# Patient Record
Sex: MALE | Race: WHITE | NOT HISPANIC OR LATINO | Employment: OTHER | URBAN - METROPOLITAN AREA
[De-identification: names, ages, dates, MRNs, and addresses within clinical notes are randomized per-mention and may not be internally consistent; named-entity substitution may affect disease eponyms.]

---

## 2017-01-17 DIAGNOSIS — J30.1 NON-SEASONAL ALLERGIC RHINITIS DUE TO POLLEN: Primary | ICD-10-CM

## 2017-01-17 DIAGNOSIS — J30.1 SEASONAL ALLERGIC RHINITIS DUE TO POLLEN: Primary | ICD-10-CM

## 2017-01-17 RX ORDER — LORATADINE 10 MG/1
10 TABLET ORAL DAILY
Qty: 90 TABLET | Refills: 3 | Status: SHIPPED | OUTPATIENT
Start: 2017-01-17 | End: 2017-07-26

## 2017-01-17 RX ORDER — DESLORATADINE 5 MG/1
5 TABLET ORAL DAILY
Qty: 90 TABLET | Refills: 3 | Status: SHIPPED | OUTPATIENT
Start: 2017-01-17 | End: 2017-01-17

## 2017-01-27 ENCOUNTER — TELEPHONE (OUTPATIENT)
Dept: FAMILY MEDICINE | Facility: CLINIC | Age: 52
End: 2017-01-27

## 2017-01-27 DIAGNOSIS — J30.1 ALLERGIC RHINITIS DUE TO POLLEN, UNSPECIFIED RHINITIS SEASONALITY: Primary | ICD-10-CM

## 2017-01-27 NOTE — TELEPHONE ENCOUNTER
"Pt stopped by . He wants Rx for Claritin D.  Claritin Rx had been sent to [facility] pharmacy and he wants the \"D\"  "

## 2017-05-21 DIAGNOSIS — I10 HTN (HYPERTENSION): ICD-10-CM

## 2017-05-21 NOTE — TELEPHONE ENCOUNTER
Diltiazem         Last Written Prescription Date: 04/08/17  Last Fill Quantity: 90, # refills: 1    Last Office Visit with G, [facility] or [facility] prescribing provider:  04/12/16   Future Office Visit:      BP Readings from Last 3 Encounters:   04/25/16 132/84   04/12/16 136/84   07/13/15 134/80     No results found for: ALT  Lab Results   Component Value Date    CHOL 257 05/18/2015     Lab Results   Component Value Date    HDL 86 05/18/2015     Lab Results   Component Value Date     05/18/2015     Lab Results   Component Value Date    TRIG 58 05/18/2015     Lab Results   Component Value Date    CHOLHDLRATIO 3.0 05/18/2015

## 2017-05-22 DIAGNOSIS — I10 HTN (HYPERTENSION): ICD-10-CM

## 2017-05-22 RX ORDER — DILTIAZEM HYDROCHLORIDE 360 MG/1
CAPSULE, EXTENDED RELEASE ORAL
Qty: 90 CAPSULE | Refills: 0 | OUTPATIENT
Start: 2017-05-22

## 2017-05-22 RX ORDER — DILTIAZEM HYDROCHLORIDE 360 MG/1
360 CAPSULE, EXTENDED RELEASE ORAL DAILY
Qty: 30 CAPSULE | Refills: 0 | Status: SHIPPED | OUTPATIENT
Start: 2017-05-22 | End: 2017-06-21

## 2017-05-22 NOTE — TELEPHONE ENCOUNTER
Look like this is a dupilcation from 5/22/17 for diltiazem (CARDIZEM CD) 360 MG 24 hr CD capsule

## 2017-06-21 DIAGNOSIS — J30.1 ALLERGIC RHINITIS DUE TO POLLEN, UNSPECIFIED RHINITIS SEASONALITY: ICD-10-CM

## 2017-06-21 DIAGNOSIS — I10 HTN (HYPERTENSION): ICD-10-CM

## 2017-06-21 NOTE — TELEPHONE ENCOUNTER
diltiazem (CARDIZEM CD) 360 MG 24 hr CD capsule      Last Written Prescription Date: 5/22/17  Last Fill Quantity: 30, # refills: 0  Last Office Visit with [facility], [facility] or [facility] prescribing provider: 4/12/17       Potassium   Date Value Ref Range Status   05/18/2015 4.6 3.4 - 5.3 mmol/L Final     Creatinine   Date Value Ref Range Status   05/18/2015 0.91 0.66 - 1.25 mg/dL Final     BP Readings from Last 3 Encounters:   04/25/16 132/84   04/12/16 136/84   07/13/15 134/80     loratadine-pseudoePHEDrine (CLARITIN-D 24-HOUR)  MG per 24 hr tablet      Last Written Prescription Date: 1/27/17  Last Fill Quantity: 30,  # refills: 2   Last Office Visit with [facility], [facility] or [facility] prescribing provider: 4/12/17

## 2017-06-22 RX ORDER — DILTIAZEM HYDROCHLORIDE 360 MG/1
360 CAPSULE, EXTENDED RELEASE ORAL DAILY
Qty: 30 CAPSULE | Refills: 0 | Status: SHIPPED | OUTPATIENT
Start: 2017-06-22 | End: 2017-07-23

## 2017-06-22 RX ORDER — LORATADINE PSEUDOEPHEDRINE SULFATE 10; 240 MG/1; MG/1
TABLET, EXTENDED RELEASE ORAL
Qty: 30 TABLET | Refills: 0 | Status: SHIPPED | OUTPATIENT
Start: 2017-06-22 | End: 2017-07-26

## 2017-07-22 DIAGNOSIS — J30.1 ALLERGIC RHINITIS DUE TO POLLEN: ICD-10-CM

## 2017-07-22 NOTE — LETTER
[facility]  [address] [place] [address]  Phone: [phone]  Fax: [phone]       July 24, 2017         Roni Martin  [address] [place] [id number]            Dear Roni:    We are concerned about your health care.  We recently provided you with medication refills.  Many medications require routine follow-up with your doctor.    Your prescription(s) have been refilled for diltiazem and wal-itin D so you may have time for the above noted follow-up. Please call to schedule soon so we can assure you have an appointment before your next refills are needed.    Thank you,      Ana Gonzales PA-C/ Francine Garcia RN

## 2017-07-23 DIAGNOSIS — I10 BENIGN ESSENTIAL HYPERTENSION: Primary | ICD-10-CM

## 2017-07-24 RX ORDER — LORATADINE 10 MG
TABLET ORAL
Qty: 30 TABLET | OUTPATIENT
Start: 2017-07-24

## 2017-07-24 RX ORDER — DILTIAZEM HYDROCHLORIDE 360 MG/1
360 CAPSULE, EXTENDED RELEASE ORAL DAILY
Qty: 7 CAPSULE | Refills: 0 | Status: SHIPPED | OUTPATIENT
Start: 2017-07-24 | End: 2017-07-26

## 2017-07-24 NOTE — TELEPHONE ENCOUNTER
Wal-Itin D       Last Written Prescription Date: 6/22/17  Last Fill Quantity: 30,  # refills: 0   Last Office Visit with [facility], [facility] or [facility] prescribing provider: 4/12/16

## 2017-07-24 NOTE — TELEPHONE ENCOUNTER
Overdue on office visit.  No refill given of allergy medication as this is controlled substance.  I did refill BP med x 1 wk.

## 2017-07-24 NOTE — TELEPHONE ENCOUNTER
Refilled x 1 wk only as was already told last month that needed to be seen before further refills.

## 2017-07-24 NOTE — TELEPHONE ENCOUNTER
Diltiazem 360 mg         Last Written Prescription Date: 6/22/17  Last Fill Quantity: 30, # refills: 0    Last Office Visit with G, P or [facility] prescribing provider:  4/12/16   Future Office Visit:      BP Readings from Last 3 Encounters:   04/25/16 132/84   04/12/16 136/84   07/13/15 134/80     No results found for: ALT  Lab Results   Component Value Date    CHOL 257 05/18/2015     Lab Results   Component Value Date    HDL 86 05/18/2015     Lab Results   Component Value Date     05/18/2015     Lab Results   Component Value Date    TRIG 58 05/18/2015     Lab Results   Component Value Date    CHOLHDLRATIO 3.0 05/18/2015

## 2017-07-26 ENCOUNTER — OFFICE VISIT (OUTPATIENT)
Dept: FAMILY MEDICINE | Facility: CLINIC | Age: 52
End: 2017-07-26
Payer: COMMERCIAL

## 2017-07-26 VITALS
RESPIRATION RATE: 20 BRPM | SYSTOLIC BLOOD PRESSURE: 128 MMHG | DIASTOLIC BLOOD PRESSURE: 84 MMHG | BODY MASS INDEX: 30.16 KG/M2 | HEART RATE: 68 BPM | WEIGHT: 222.4 LBS | TEMPERATURE: 97.8 F

## 2017-07-26 DIAGNOSIS — J30.1 CHRONIC SEASONAL ALLERGIC RHINITIS DUE TO POLLEN: Primary | ICD-10-CM

## 2017-07-26 DIAGNOSIS — I10 BENIGN ESSENTIAL HYPERTENSION: ICD-10-CM

## 2017-07-26 PROCEDURE — 99213 OFFICE O/P EST LOW 20 MIN: CPT | Performed by: PHYSICIAN ASSISTANT

## 2017-07-26 RX ORDER — DILTIAZEM HYDROCHLORIDE 360 MG/1
360 CAPSULE, EXTENDED RELEASE ORAL DAILY
Qty: 90 CAPSULE | Refills: 3 | Status: SHIPPED | OUTPATIENT
Start: 2017-07-26 | End: 2018-08-01

## 2017-07-26 ASSESSMENT — ENCOUNTER SYMPTOMS
HEMOPTYSIS: 0
DIZZINESS: 0
EYE REDNESS: 0
NAUSEA: 0
DEPRESSION: 0
NERVOUS/ANXIOUS: 0
DOUBLE VISION: 0
FOCAL WEAKNESS: 0
DIARRHEA: 0
PALPITATIONS: 0
CONSTIPATION: 0
SENSORY CHANGE: 0
WEAKNESS: 0
BACK PAIN: 0
EYE PAIN: 0
SHORTNESS OF BREATH: 0
ORTHOPNEA: 0
TINGLING: 0
DYSURIA: 0
MYALGIAS: 0
PHOTOPHOBIA: 0
INSOMNIA: 0
FEVER: 0
EYE DISCHARGE: 0
BLOOD IN STOOL: 0
HEADACHES: 0
BLURRED VISION: 0
SORE THROAT: 0
ABDOMINAL PAIN: 0
SPUTUM PRODUCTION: 0
NEUROLOGICAL NEGATIVE: 1
NECK PAIN: 0
WEIGHT LOSS: 0
SEIZURES: 0
DIAPHORESIS: 0
HALLUCINATIONS: 0
WHEEZING: 0
HEARTBURN: 0
VOMITING: 0
COUGH: 0
FREQUENCY: 0
LOSS OF CONSCIOUSNESS: 0

## 2017-07-26 ASSESSMENT — LIFESTYLE VARIABLES: SUBSTANCE_ABUSE: 0

## 2017-07-26 NOTE — MR AVS SNAPSHOT
"              After Visit Summary   2017    Roni Martin    MRN: [id number]           Patient Information     Date Of Birth          1965        Visit Information        Provider Department      2017 2:20 PM Meliton Woods PA-C [facility]        Today's Diagnoses     Chronic seasonal allergic rhinitis due to pollen    -  1    Benign essential hypertension           Follow-ups after your visit        Follow-up notes from your care team     Return if symptoms worsen or fail to improve.      Who to contact     If you have questions or need follow up information about today's clinic visit or your schedule please contact [facility] directly at [phone].  Normal or non-critical lab and imaging results will be communicated to you by MyChart, letter or phone within 4 business days after the clinic has received the results. If you do not hear from us within 7 days, please contact the clinic through MyChart or phone. If you have a critical or abnormal lab result, we will notify you by phone as soon as possible.  Submit refill requests through [employer] or call your pharmacy and they will forward the refill request to us. Please allow 3 business days for your refill to be completed.          Additional Information About Your Visit        MyChart Information     [employer] lets you send messages to your doctor, view your test results, renew your prescriptions, schedule appointments and more. To sign up, go to [email or web address] . Click on \"Log in\" on the left side of the screen, which will take you to the Welcome page. Then click on \"Sign up Now\" on the right side of the page.     You will be asked to enter the access code listed below, as well as some personal information. Please follow the directions to create your username and password.     Your access code is: [email or web address]  Expires: 10/24/2017  3:30 PM     Your access code will  in 90 days. If you need help or " a new code, please call your [facility] or [phone].        Care EveryWhere ID     This is your Care EveryWhere ID. This could be used by other organizations to access your [facility] medical records  [id number]        Your Vitals Were     Pulse Temperature Respirations BMI (Body Mass Index)          68 97.8  F (36.6  C) (Tympanic) 20 30.16 kg/m2         Blood Pressure from Last 3 Encounters:   07/26/17 128/84   04/25/16 132/84   04/12/16 136/84    Weight from Last 3 Encounters:   07/26/17 222 lb 6.4 oz (100.9 kg)   04/12/16 229 lb (103.9 kg)   06/16/15 229 lb (103.9 kg)              Today, you had the following     No orders found for display         Today's Medication Changes          These changes are accurate as of: 7/26/17  3:30 PM.  If you have any questions, ask your nurse or doctor.               These medicines have changed or have updated prescriptions.        Dose/Directions    loratadine-pseudoePHEDrine  MG per 24 hr tablet   Commonly known as:  CLARITIN-D 24 HOUR   This may have changed:  See the new instructions.   Used for:  Chronic seasonal allergic rhinitis due to pollen   Changed by:  Meliton Woods PA-C        Dose:  1 tablet   Take 1 tablet by mouth daily   Quantity:  90 tablet   Refills:  1            Where to get your medicines      These medications were sent to [facility] [address] [place] [address] [address] [facility] OF [address] [place] [address]  [address] [facility] [address]     Phone:  [phone]     diltiazem 360 MG 24 hr CD capsule         Some of these will need a paper prescription and others can be bought over the counter.  Ask your nurse if you have questions.     Bring a paper prescription for each of these medications     loratadine-pseudoePHEDrine  MG per 24 hr tablet                Primary Care Provider Office Phone # Fax #    Ana Gonzales PA-C [phone]       [address]  [facility] [address]        Equal Access to Services     [facility] DONNY : Hadii aad ku hadrichjuventino Kingali, watobida luqadaha, qaybta kaalmamaximino veliz, renzo hdz. So [facility] [phone].    ATENCIÓN: Si habla español, tiene a perez disposición servicios gratuitos de asistencia lingüística. Jasbirame al [phone].    We comply with applicable federal civil rights laws and [place] laws. We do not discriminate on the basis of race, color, national origin, age, disability sex, sexual orientation or gender identity.            Thank you!     Thank you for choosing [facility]  for your care. Our goal is always to provide you with excellent care. Hearing back from our patients is one way we can continue to improve our services. Please take a few minutes to complete the written survey that you may receive in the mail after your visit with us. Thank you!             Your Updated Medication List - Protect others around you: Learn how to safely use, store and throw away your medicines at www.disposemymeds.org.          This list is accurate as of: 7/26/17  3:30 PM.  Always use your most recent med list.                   Brand Name Dispense Instructions for use Diagnosis    diltiazem 360 MG 24 hr CD capsule    CARDIZEM CD; CARTIA XT    90 capsule    Take 1 capsule (360 mg) by mouth daily Overdue on office visit.    Benign essential hypertension       fluticasone 50 MCG/ACT spray    FLONASE    1 Package    Spray 1-2 sprays into both nostrils daily.        loratadine-pseudoePHEDrine  MG per 24 hr tablet    CLARITIN-D 24 HOUR    90 tablet    Take 1 tablet by mouth daily    Chronic seasonal allergic rhinitis due to pollen       order for DME     1 Units    Equipment being ordered: automated home BP cuff    HTN (hypertension)

## 2017-07-26 NOTE — PROGRESS NOTES
HPI    SUBJECTIVE:                                                    Roni Martin is a 51 year old male who presents to clinic today for follow-up of his blood pressure. He is frustrated that he has to come in for this appointment because this is his only day for the last 20 days. His blood pressure has been stable and he's been having no problems.    Hypertension Follow-up      Outpatient blood pressures are not being checked.    Low Salt Diet: not monitoring salt        Amount of exercise or physical activity: None    Problems taking medications regularly: No    Medication side effects: none  Diet: regular (no restrictions)    Problem list and histories reviewed & adjusted, as indicated.  Additional history: as documented    Patient Active Problem List   Diagnosis     Obesity     Benign essential hypertension     Elevated fasting blood sugar     Hyperlipidemia with target LDL less than 130     History reviewed. No pertinent surgical history.    Social History   Substance Use Topics     Smoking status: Never Smoker     Smokeless tobacco: Never Used     Alcohol use Yes     Family History   Problem Relation Age of Onset     Parkinsonism Mother      DIABETES Father      Hypertension Father      Hyperlipidemia No family hx of      HEART DISEASE No family hx of          Current Outpatient Prescriptions   Medication Sig Dispense Refill     diltiazem (CARDIZEM CD; CARTIA XT) 360 MG 24 hr CD capsule Take 1 capsule (360 mg) by mouth daily Overdue on office visit. 90 capsule 3     loratadine-pseudoePHEDrine (CLARITIN-D 24 HOUR)  MG per 24 hr tablet Take 1 tablet by mouth daily 90 tablet 1     ORDER FOR DME, SET TO LOCAL PRINT, Equipment being ordered: automated home BP cuff 1 Units 0     fluticasone (FLONASE) 50 MCG/ACT nasal spray Spray 1-2 sprays into both nostrils daily. 1 Package 11     [DISCONTINUED] diltiazem (CARDIZEM CD; CARTIA XT) 360 MG 24 hr CD capsule Take 1 capsule (360 mg) by mouth daily Overdue on  office visit. 7 capsule 0     No Known Allergies  Recent Labs   Lab Test  05/18/15   0940   LDL  159*   HDL  86   TRIG  58   CR  0.91   GFRESTIMATED  87   GFRESTBLACK  >90   GFR Calc     POTASSIUM  4.6   TSH  1.53      Labs reviewed in EPIC        Reviewed and updated as needed this visit by clinical staff     Reviewed and updated as needed this visit by Provider       Review of Systems   Constitutional: Negative for diaphoresis, fever, malaise/fatigue and weight loss.   HENT: Negative for congestion, ear discharge, ear pain, hearing loss, nosebleeds and sore throat.    Eyes: Negative for blurred vision, double vision, photophobia, pain, discharge and redness.   Respiratory: Negative for cough, hemoptysis, sputum production, shortness of breath and wheezing.    Cardiovascular: Negative for chest pain, palpitations, orthopnea and leg swelling.   Gastrointestinal: Negative for abdominal pain, blood in stool, constipation, diarrhea, heartburn, melena, nausea and vomiting.   Genitourinary: Negative.  Negative for dysuria, frequency and urgency.   Musculoskeletal: Negative for back pain, joint pain, myalgias and neck pain.   Skin: Negative for itching and rash.   Neurological: Negative.  Negative for dizziness, tingling, sensory change, focal weakness, seizures, loss of consciousness, weakness and headaches.   Endo/Heme/Allergies: Negative.    Psychiatric/Behavioral: Negative for depression, hallucinations, substance abuse and suicidal ideas. The patient is not nervous/anxious and does not have insomnia.          Physical Exam   Constitutional: He is oriented to person, place, and time and well-developed, well-nourished, and in no distress.   HENT:   Head: Normocephalic and atraumatic.   Right Ear: External ear normal.   Left Ear: External ear normal.   Nose: Nose normal.   Mouth/Throat: Oropharynx is clear and moist.   Eyes: Conjunctivae and EOM are normal. Pupils are equal, round, and reactive to light.  Right eye exhibits no discharge. Left eye exhibits no discharge. No scleral icterus.   Neck: Normal range of motion. Neck supple. No thyromegaly present.   Cardiovascular: Normal rate, regular rhythm, normal heart sounds and intact distal pulses.  Exam reveals no gallop and no friction rub.    No murmur heard.  Pulmonary/Chest: Effort normal and breath sounds normal. No respiratory distress. He has no wheezes. He has no rales. He exhibits no tenderness.   Abdominal: Soft. Bowel sounds are normal. He exhibits no distension and no mass. There is no tenderness. There is no rebound and no guarding.   Musculoskeletal: Normal range of motion. He exhibits no edema or tenderness.   Lymphadenopathy:     He has no cervical adenopathy.   Neurological: He is alert and oriented to person, place, and time. He has normal reflexes. No cranial nerve deficit. He exhibits normal muscle tone. Gait normal. Coordination normal.   Skin: Skin is warm and dry. No rash noted. No erythema.   Psychiatric: Mood, memory, affect and judgment normal.       (J30.1) Chronic seasonal allergic rhinitis due to pollen  (primary encounter diagnosis)  Comment:   Plan: loratadine-pseudoePHEDrine (CLARITIN-D 24 HOUR)         MG per 24 hr tablet            (I10) Benign essential hypertension  Comment:  Plan: diltiazem (CARDIZEM CD; CARTIA XT) 360 MG 24 hr        CD capsule            Medication refilled and follow up in the next year.

## 2017-07-26 NOTE — NURSING NOTE
Chief Complaint   Patient presents with     Hypertension       Initial BP (!) 142/92 (BP Location: Right arm, Patient Position: Chair, Cuff Size: Adult Large)  Pulse 68  Temp 97.8  F (36.6  C) (Tympanic)  Resp 20  Wt 222 lb 6.4 oz (100.9 kg)  BMI 30.16 kg/m2 Estimated body mass index is 30.16 kg/(m^2) as calculated from the following:    Height as of 4/12/16: 6' (1.829 m).    Weight as of this encounter: 222 lb 6.4 oz (100.9 kg).  Medication Reconciliation: complete    Health Maintenance that is potentially due pending provider review:  Colonoscopy/FIT    Patient says this is something that he will have to complete during the winter months.    Is there anyone who you would like to be able to receive your results? Yes  If yes have patient fill out MARSHA VALDEZ CMA

## 2017-11-30 ENCOUNTER — TELEPHONE (OUTPATIENT)
Dept: FAMILY MEDICINE | Facility: CLINIC | Age: 52
End: 2017-11-30

## 2017-11-30 ENCOUNTER — RADIANT APPOINTMENT (OUTPATIENT)
Dept: GENERAL RADIOLOGY | Facility: CLINIC | Age: 52
End: 2017-11-30
Attending: FAMILY MEDICINE
Payer: COMMERCIAL

## 2017-11-30 ENCOUNTER — OFFICE VISIT (OUTPATIENT)
Dept: FAMILY MEDICINE | Facility: CLINIC | Age: 52
End: 2017-11-30
Payer: COMMERCIAL

## 2017-11-30 VITALS
DIASTOLIC BLOOD PRESSURE: 89 MMHG | SYSTOLIC BLOOD PRESSURE: 130 MMHG | TEMPERATURE: 97.2 F | HEIGHT: 76 IN | WEIGHT: 225 LBS | HEART RATE: 93 BPM | BODY MASS INDEX: 27.4 KG/M2

## 2017-11-30 DIAGNOSIS — M25.532 LEFT WRIST PAIN: Primary | ICD-10-CM

## 2017-11-30 DIAGNOSIS — M25.532 LEFT WRIST PAIN: ICD-10-CM

## 2017-11-30 DIAGNOSIS — J30.1 CHRONIC SEASONAL ALLERGIC RHINITIS DUE TO POLLEN: ICD-10-CM

## 2017-11-30 PROCEDURE — 99214 OFFICE O/P EST MOD 30 MIN: CPT | Performed by: FAMILY MEDICINE

## 2017-11-30 PROCEDURE — 73110 X-RAY EXAM OF WRIST: CPT | Mod: LT

## 2017-11-30 RX ORDER — PREDNISONE 20 MG/1
TABLET ORAL
Qty: 20 TABLET | Refills: 0 | Status: SHIPPED | OUTPATIENT
Start: 2017-11-30 | End: 2017-12-13

## 2017-11-30 NOTE — PROGRESS NOTES
SUBJECTIVE:   Roni Martin is a 51 year old male who presents to clinic today for the following health issues:      Joint Pain    Onset: L wrist on and off for 2 yrs has increased within the last week     Description:   Location: left wrist  Character: Sharp, Dull ache and tighness    Intensity: severe    Progression of Symptoms: worse    Accompanying Signs & Symptoms:  Other symptoms: radiation of pain to in hand and fingers , swelling, redness and tighness    History:   Previous similar pain: YES- off and on for 2 yrs       Precipitating factors:   Trauma or overuse: YES- overuse Patient is in construction     Alleviating factors:  Improved by: Ibuprofen helps, just a little better with ice     Therapies Tried and outcome: same       Patient is here for left wrist pain . Ongoing for about two years on and off but in the last week pain has been more severe. He does a lot of construction work and he attributes the pain to the repetitive work that he has been doing. This last week there has been swelling ,warmth and pain. He denies any systemic symptoms such as fevers or chills. He also mentioned that he has had Gout in the past and was wondering if this is Gout.         Problem list and histories reviewed & adjusted, as indicated.  Additional history: as documented    Patient Active Problem List   Diagnosis     Obesity     Benign essential hypertension     Elevated fasting blood sugar     Hyperlipidemia with target LDL less than 130     No past surgical history on file.    Social History   Substance Use Topics     Smoking status: Never Smoker     Smokeless tobacco: Never Used     Alcohol use Yes     Family History   Problem Relation Age of Onset     Parkinsonism Mother      DIABETES Father      Hypertension Father      Hyperlipidemia No family hx of      HEART DISEASE No family hx of          Current Outpatient Prescriptions   Medication Sig Dispense Refill     loratadine-pseudoePHEDrine (CLARITIN-D 24 HOUR)  " MG per 24 hr tablet Take 1 tablet by mouth daily 30 tablet 3     predniSONE (DELTASONE) 20 MG tablet Take 3 tabs (60 mg) by mouth daily x 3 days, 2 tabs (40 mg) daily x 3 days, 1 tab (20 mg) daily x 3 days, then 1/2 tab (10 mg) x 3 days. 20 tablet 0     diltiazem (CARDIZEM CD; CARTIA XT) 360 MG 24 hr CD capsule Take 1 capsule (360 mg) by mouth daily Overdue on office visit. 90 capsule 3     fluticasone (FLONASE) 50 MCG/ACT nasal spray Spray 1-2 sprays into both nostrils daily. 1 Package 11     ORDER FOR DME, SET TO LOCAL PRINT, Equipment being ordered: automated home BP cuff 1 Units 0     No Known Allergies  BP Readings from Last 3 Encounters:   11/30/17 130/89   07/26/17 128/84   04/25/16 132/84    Wt Readings from Last 3 Encounters:   11/30/17 225 lb (102.1 kg)   07/26/17 222 lb 6.4 oz (100.9 kg)   04/12/16 229 lb (103.9 kg)                  Labs reviewed in EPIC        Reviewed and updated as needed this visit by clinical staffAllergies       Reviewed and updated as needed this visit by Provider         ROS:  Constitutional, HEENT, cardiovascular, pulmonary, gi and gu systems are negative, except as otherwise noted.      OBJECTIVE:   /89  Pulse 93  Temp 97.2  F (36.2  C)  Ht 6' 4\" (1.93 m)  Wt 225 lb (102.1 kg)  BMI 27.39 kg/m2  Body mass index is 27.39 kg/(m^2).  GENERAL: healthy, alert and no distress  MS: decreased range of motion of left wrist  and tenderness to palpation on surface of left wrist .     Diagnostic Test Results:  X-rays   Shows no acute fracture , await radiology report     ASSESSMENT/PLAN:       1. Chronic seasonal allergic rhinitis due to pollen  Medication refilled. Cautioned on long term use of decongestants. Asked to use plain Claritin  at first and if symptoms worsen then use Claritin D   - loratadine-pseudoePHEDrine (CLARITIN-D 24 HOUR)  MG per 24 hr tablet; Take 1 tablet by mouth daily  Dispense: 30 tablet; Refill: 3    2. Left wrist pain  Wrist x-rays look okay " , await radiology report. Differentials include arthritis flare, gout . Trial of prednisone , if symptoms persist patient is asked to call clinic  - XR Wrist Left G/E 3 Views; Future  - predniSONE (DELTASONE) 20 MG tablet; Take 3 tabs (60 mg) by mouth daily x 3 days, 2 tabs (40 mg) daily x 3 days, 1 tab (20 mg) daily x 3 days, then 1/2 tab (10 mg) x 3 days.  Dispense: 20 tablet; Refill: 0    FUTURE APPOINTMENTS:       - Follow-up visit as needed    Torin Aldana MD  [facility]

## 2017-11-30 NOTE — PATIENT INSTRUCTIONS
Thank you for choosing [facility] ([facility]3].  You may be receiving a survey in the mail from Jak Acosta regarding your visit today.  Please take a few minutes to complete and return the survey to let us know how we are doing.      If you have questions or concerns, please contact us via [employer] or you can contact your care team at [phone].    Our Clinic hours are:  Monday 6:40 am  to 7:00 pm  Tuesday -Friday 6:40 am to 5:00 pm    The [place] outpatient lab hours are:  Monday - Friday 6:10 am to 4:45 pm  Saturdays 7:00 am to 11:00 am  Appointments are required, call [phone]    If you have clinical questions after hours or would like to schedule an appointment,  call the clinic at [phone].  What Is Arthritis?  Arthritis is a disease that affects the joints. Joints are the parts where bones meet and move. It can affect any joint in your body. There are many types of arthritis, including osteoarthritis, rheumatoid arthritis, gout and lupus. If your symptoms are mild, medicines may be enough to ease pain and swelling. For more severe arthritis, you may need surgery to improve the condition of the joint or replace part or all of the joint.                  What causes arthritis?  Cartilage is a smooth substance that protects the ends of your bones and provides cushioning. When you have arthritis, this cartilage breaks down and can no longer protect your bones. This can happen from an autoimmune disease. Or it can happen from wear and tear, infections, or trauma. The bones rub against each other, causing pain and swelling. Over time, small pieces of rough or splintered bone (bone spurs) may develop. The joint's range of motion can become limited.  Symptoms  Some of the more common symptoms of arthritis include:    Joint pain and stiffness. Pain and stiffness get worse with long periods of rest or using a joint too long or too hard.    Joints that have lost normal shape and motion    Tender, inflamed  joints. They may look red and feel warm.    Grinding or popping noise with joint movement    Feeling tired all the time  Reducing symptoms  Following a healthy lifestyle by losing weight and exercising can help ease symptoms of osteoarthritis. Strengthening muscles around the affected joint may will reduce the strain on the joint. Hot and cold packs may help. Over-the-counter and prescription medicines can be very helpful for arthritis. Talk with your healthcare provider about the best treatments for your condition.   Date Last Reviewed: 5/1/2017 2000-2017 [employer]. [address] [place] [address], [place] [address]. All rights reserved. This information is not intended as a substitute for professional medical care. Always follow your healthcare professional's instructions.

## 2017-11-30 NOTE — MR AVS SNAPSHOT
After Visit Summary   11/30/2017    Roni Martin    MRN: [id number]           Patient Information     Date Of Birth          1965        Visit Information        Provider Department      11/30/2017 8:00 AM Torin Aldana MD [facility]        Today's Diagnoses     Left wrist pain    -  1    Chronic seasonal allergic rhinitis due to pollen          Care Instructions          Thank you for choosing [facility].  You may be receiving a survey in the mail from [facility] regarding your visit today.  Please take a few minutes to complete and return the survey to let us know how we are doing.      If you have questions or concerns, please contact us via [employer] or you can contact your care team at [phone].    Our Clinic hours are:  Monday 6:40 am  to 7:00 pm  Tuesday -Friday 6:40 am to 5:00 pm    The [place] outpatient lab hours are:  Monday - Friday 6:10 am to 4:45 pm  Saturdays 7:00 am to 11:00 am  Appointments are required, call [phone]    If you have clinical questions after hours or would like to schedule an appointment,  call the clinic at [phone].  What Is Arthritis?  Arthritis is a disease that affects the joints. Joints are the parts where bones meet and move. It can affect any joint in your body. There are many types of arthritis, including osteoarthritis, rheumatoid arthritis, gout and lupus. If your symptoms are mild, medicines may be enough to ease pain and swelling. For more severe arthritis, you may need surgery to improve the condition of the joint or replace part or all of the joint.                  What causes arthritis?  Cartilage is a smooth substance that protects the ends of your bones and provides cushioning. When you have arthritis, this cartilage breaks down and can no longer protect your bones. This can happen from an autoimmune disease. Or it can happen from wear and tear, infections, or trauma. The bones rub against each other,  causing pain and swelling. Over time, small pieces of rough or splintered bone (bone spurs) may develop. The joint's range of motion can become limited.  Symptoms  Some of the more common symptoms of arthritis include:    Joint pain and stiffness. Pain and stiffness get worse with long periods of rest or using a joint too long or too hard.    Joints that have lost normal shape and motion    Tender, inflamed joints. They may look red and feel warm.    Grinding or popping noise with joint movement    Feeling tired all the time  Reducing symptoms  Following a healthy lifestyle by losing weight and exercising can help ease symptoms of osteoarthritis. Strengthening muscles around the affected joint may will reduce the strain on the joint. Hot and cold packs may help. Over-the-counter and prescription medicines can be very helpful for arthritis. Talk with your healthcare provider about the best treatments for your condition.   Date Last Reviewed: 5/1/2017 2000-2017 [employer]. [address] [place] [address]. All rights reserved. This information is not intended as a substitute for professional medical care. Always follow your healthcare professional's instructions.                Follow-ups after your visit        Who to contact     If you have questions or need follow up information about today's clinic visit or your schedule please contact [facility] directly at [phone].  Normal or non-critical lab and imaging results will be communicated to you by Zenosshart, letter or phone within 4 business days after the clinic has received the results. If you do not hear from us within 7 days, please contact the clinic through Zenosshart or phone. If you have a critical or abnormal lab result, we will notify you by phone as soon as possible.  Submit refill requests through [employer] or call your pharmacy and they will forward the refill request to us. Please allow 3 business days for your  "refill to be completed.          Additional Information About Your Visit        "[employer] Information     "[employer] lets you send messages to your doctor, view your test results, renew your prescriptions, schedule appointments and more. To sign up, go to [email or web address] . Click on \"Log in\" on the left side of the screen, which will take you to the Welcome page. Then click on \"Sign up Now\" on the right side of the page.     You will be asked to enter the access code listed below, as well as some personal information. Please follow the directions to create your username and password.     Your access code is: [email or web address]  Expires: 2018  8:40 AM     Your access code will  in 90 days. If you need help or a new code, please call your [place] clinic or [phone].        Care EveryWhere ID     This is your Care EveryWhere ID. This could be used by other organizations to access your [place] medical records  [id number]        Your Vitals Were     Pulse Temperature Height BMI (Body Mass Index)          93 97.2  F (36.2  C) 6' 4\" (1.93 m) 27.39 kg/m2         Blood Pressure from Last 3 Encounters:   17 130/89   17 128/84   16 132/84    Weight from Last 3 Encounters:   17 225 lb (102.1 kg)   17 222 lb 6.4 oz (100.9 kg)   16 229 lb (103.9 kg)                 Today's Medication Changes          These changes are accurate as of: 17  8:41 AM.  If you have any questions, ask your nurse or doctor.               Start taking these medicines.        Dose/Directions    predniSONE 20 MG tablet   Commonly known as:  DELTASONE   Used for:  Left wrist pain   Started by:  Torin Aldana MD        Take 3 tabs (60 mg) by mouth daily x 3 days, 2 tabs (40 mg) daily x 3 days, 1 tab (20 mg) daily x 3 days, then 1/2 tab (10 mg) x 3 days.   Quantity:  20 tablet   Refills:  0            Where to get your medicines      These medications were sent to [id number] " - [place], [place] - [address]  [address], [facility] [address]     Phone:  [phone]     predniSONE 20 MG tablet         Some of these will need a paper prescription and others can be bought over the counter.  Ask your nurse if you have questions.     Bring a paper prescription for each of these medications     loratadine-pseudoePHEDrine  MG per 24 hr tablet                Primary Care Provider Office Phone # Fax #    Ana Gonzales PA-C [phone] [address] [facility] [address]        Equal Access to Services     JACK HINES : Hadii aad ku hadasho Sokekeali, waaxda luqadaha, qaybta kaalmada adeegyada, renzo hdz. So [facility] [phone].    ATENCIÓN: Si habla español, tiene a perez disposición servicios gratuitos de asistencia lingüística. [facility] [phone].    We comply with applicable federal civil rights laws and [place] laws. We do not discriminate on the basis of race, color, national origin, age, disability, sex, sexual orientation, or gender identity.            Thank you!     Thank you for choosing [facility]  for your care. Our goal is always to provide you with excellent care. Hearing back from our patients is one way we can continue to improve our services. Please take a few minutes to complete the written survey that you may receive in the mail after your visit with us. Thank you!             Your Updated Medication List - Protect others around you: Learn how to safely use, store and throw away your medicines at www.disposemymeds.org.          This list is accurate as of: 11/30/17  8:41 AM.  Always use your most recent med list.                   Brand Name Dispense Instructions for use Diagnosis    diltiazem 360 MG 24 hr CD capsule    CARDIZEM CD; CARTIA XT    90 capsule    Take 1 capsule (360 mg) by mouth daily Overdue on office visit.    Benign essential  hypertension       fluticasone 50 MCG/ACT spray    FLONASE    1 Package    Spray 1-2 sprays into both nostrils daily.        loratadine-pseudoePHEDrine  MG per 24 hr tablet    CLARITIN-D 24 HOUR    30 tablet    Take 1 tablet by mouth daily    Chronic seasonal allergic rhinitis due to pollen       order for DME     1 Units    Equipment being ordered: automated home BP cuff    HTN (hypertension)       predniSONE 20 MG tablet    DELTASONE    20 tablet    Take 3 tabs (60 mg) by mouth daily x 3 days, 2 tabs (40 mg) daily x 3 days, 1 tab (20 mg) daily x 3 days, then 1/2 tab (10 mg) x 3 days.    Left wrist pain

## 2017-11-30 NOTE — TELEPHONE ENCOUNTER
Rx for Claritin D 24 hour faxed to [facility].  Unable to reach patient or leave a message as mailbox is full.

## 2017-12-13 ENCOUNTER — OFFICE VISIT (OUTPATIENT)
Dept: FAMILY MEDICINE | Facility: CLINIC | Age: 52
End: 2017-12-13
Payer: COMMERCIAL

## 2017-12-13 ENCOUNTER — TELEPHONE (OUTPATIENT)
Dept: FAMILY MEDICINE | Facility: CLINIC | Age: 52
End: 2017-12-13

## 2017-12-13 VITALS
OXYGEN SATURATION: 97 % | SYSTOLIC BLOOD PRESSURE: 142 MMHG | TEMPERATURE: 97.8 F | WEIGHT: 228.4 LBS | DIASTOLIC BLOOD PRESSURE: 98 MMHG | BODY MASS INDEX: 27.8 KG/M2 | HEART RATE: 73 BPM

## 2017-12-13 DIAGNOSIS — J02.9 VIRAL PHARYNGITIS: ICD-10-CM

## 2017-12-13 DIAGNOSIS — Z23 NEED FOR PROPHYLACTIC VACCINATION AND INOCULATION AGAINST INFLUENZA: ICD-10-CM

## 2017-12-13 DIAGNOSIS — F40.240 CLAUSTROPHOBIA: ICD-10-CM

## 2017-12-13 DIAGNOSIS — Z12.11 SPECIAL SCREENING FOR MALIGNANT NEOPLASMS, COLON: ICD-10-CM

## 2017-12-13 DIAGNOSIS — M25.532 LEFT WRIST PAIN: Primary | ICD-10-CM

## 2017-12-13 PROCEDURE — 90471 IMMUNIZATION ADMIN: CPT | Performed by: INTERNAL MEDICINE

## 2017-12-13 PROCEDURE — 90686 IIV4 VACC NO PRSV 0.5 ML IM: CPT | Performed by: INTERNAL MEDICINE

## 2017-12-13 PROCEDURE — 99214 OFFICE O/P EST MOD 30 MIN: CPT | Mod: 25 | Performed by: INTERNAL MEDICINE

## 2017-12-13 RX ORDER — PREDNISONE 20 MG/1
TABLET ORAL
Qty: 20 TABLET | Refills: 0 | Status: SHIPPED | OUTPATIENT
Start: 2017-12-13 | End: 2019-03-29

## 2017-12-13 RX ORDER — LORATADINE 10 MG/1
10 TABLET ORAL DAILY
COMMUNITY
End: 2019-12-20

## 2017-12-13 RX ORDER — LORAZEPAM 0.5 MG/1
TABLET ORAL
Qty: 2 TABLET | Refills: 0 | Status: SHIPPED | OUTPATIENT
Start: 2017-12-13 | End: 2017-12-13

## 2017-12-13 RX ORDER — LORAZEPAM 0.5 MG/1
TABLET ORAL
Qty: 2 TABLET | Refills: 0 | Status: SHIPPED | OUTPATIENT
Start: 2017-12-13 | End: 2019-03-29

## 2017-12-13 NOTE — MR AVS SNAPSHOT
After Visit Summary   12/13/2017    Roni Martin    MRN: [id number]           Patient Information     Date Of Birth          1965        Visit Information        Provider Department      12/13/2017 9:40 AM Aj Porter MD [facility]        Today's Diagnoses     Need for prophylactic vaccination and inoculation against influenza    -  1    Left wrist pain        Claustrophobia          Care Instructions    We'll restart the prednisone and get an MRI of the wrist to get more information.  Follow-up with sports medicine if the pain is still not resolving.      Throat pain seems most likely viral based on exam, but if your symptoms are worsening, please return to have a throat swab done.   Viral Pharyngitis (Sore Throat)    You (or your child, if your child is the patient) have pharyngitis (sore throat). This infection is caused by a virus. It can cause throat pain that is worse when swallowing, aching all over, headache, and fever. The infection may be spread by coughing, kissing, or touching others after touching your mouth or nose. Antibiotic medications do not work against viruses, so they are not used for treating this condition.  Home care    If your symptoms are severe, rest at home. Return to work or school when you feel well enough.     Drink plenty of fluids to avoid dehydration.    For children: Use acetaminophen for fever, fussiness or discomfort. In infants over six months of age, you may use ibuprofen instead of acetaminophen. (NOTE: If your child has chronic liver or kidney disease or ever had a stomach ulcer or GI bleeding, talk with your doctor before using these medicines.) (NOTE: Aspirin should never be used in anyone under 18 years of age who is ill with a fever. It may cause severe liver damage.)     For adults: You may use acetaminophen or ibuprofen to control pain or fever, unless another medicine was prescribed for this. (NOTE: If you have chronic liver or  kidney disease or ever had a stomach ulcer or GI bleeding, talk with your doctor before using these medicines.)    Throat lozenges or numbing throat sprays can help reduce pain. Gargling with warm salt water will also help reduce throat pain. For this, dissolve 1/2 teaspoon of salt in 1 glass of warm water. To help soothe a sore throat, children can sip on juice or a popsicle. Children 5 years and older can also suck on a lollipop or hard candy.    Avoid salty or spicy foods, which can be irritating to the throat.  Follow-up care  Follow up with your healthcare provider or our staff if you are not improving over the next week.  When to seek medical advice  Call your healthcare provider right away if any of these occur:    Fever as directed by your doctor.  For children, seek care if:    Your child is of any age and has repeated fevers above 104 F (40 C).    Your child is younger than 2 years of age and has a fever of 100.4 F (38 C) that continues for more than 1 day.    Your child is 2 years old or older and has a fever of 100.4 F (38 C) that continues for more than 3 days.    New or worsening ear pain, sinus pain, or headache    Painful lumps in the back of neck    Stiff neck    Lymph nodes are getting larger    Inability to swallow liquids, excessive drooling, or inability to open mouth wide due to throat pain    Signs of dehydration (very dark urine or no urine, sunken eyes, dizziness)    Trouble breathing or noisy breathing    Muffled voice    New rash    Child appears to be getting sicker  Date Last Reviewed: 4/13/2015 2000-2017 [employer]. [address] [place] [address], [place] [address]. All rights reserved. This information is not intended as a substitute for professional medical care. Always follow your healthcare professional's instructions.                Follow-ups after your visit        Additional Services     [facility] is referring you to the  Orthopedic  Services at [facility].       The  Representative will assist you in the coordination of your Orthopedic and Musculoskeletal Care as prescribed by your physician.    The  Representative will call you within 1 business day to help schedule your appointment, or you may contact the  Representative at:    All areas ~ [phone]     Type of Referral : Non Surgical       Timeframe requested: Routine    Coverage of these services is subject to the terms and limitations of your health insurance plan.  Please call member services at your health plan with any benefit or coverage questions.      If X-rays, CT or MRI's have been performed, please contact the facility where they were done to arrange for , prior to your scheduled appointment.  Please bring this referral request to your appointment and present it to your specialist.                  Future tests that were ordered for you today     Open Future Orders        Priority Expected Expires Ordered    MR Wrist Left w/o Contrast Routine  12/13/2018 12/13/2017            Who to contact     If you have questions or need follow up information about today's clinic visit or your schedule please contact [facility] directly at [phone].  Normal or non-critical lab and imaging results will be communicated to you by MyChart, letter or phone within 4 business days after the clinic has received the results. If you do not hear from us within 7 days, please contact the clinic through Media[employer] or phone. If you have a critical or abnormal lab result, we will notify you by phone as soon as possible.  Submit refill requests through [employer] or call your pharmacy and they will forward the refill request to us. Please allow 3 business days for your refill to be completed.          Additional Information About Your Visit        [employer] Information     [employer] lets you send messages to your  "doctor, view your test results, renew your prescriptions, schedule appointments and more. To sign up, go to [email or web address] . Click on \"Log in\" on the left side of the screen, which will take you to the Welcome page. Then click on \"Sign up Now\" on the right side of the page.     You will be asked to enter the access code listed below, as well as some personal information. Please follow the directions to create your username and password.     Your access code is: [email or web address]  Expires: 2018  8:40 AM     Your access code will  in 90 days. If you need help or a new code, please call your [place] clinic or [phone].        Care EveryWhere ID     This is your Care EveryWhere ID. This could be used by other organizations to access your [place] medical records  [id number]        Your Vitals Were     Pulse Temperature Pulse Oximetry BMI (Body Mass Index)          73 97.8  F (36.6  C) (Tympanic) 97% 27.8 kg/m2         Blood Pressure from Last 3 Encounters:   17 (!) 142/98   17 130/89   17 128/84    Weight from Last 3 Encounters:   17 228 lb 6.4 oz (103.6 kg)   17 225 lb (102.1 kg)   17 222 lb 6.4 oz (100.9 kg)              We Performed the Following     FLU VAC, SPLIT VIRUS IM > 3 YO (QUADRIVALENT) [41224]     [facility] REFERRAL     Vaccine Administration, Initial [26885]          Today's Medication Changes          These changes are accurate as of: 17 10:22 AM.  If you have any questions, ask your nurse or doctor.               Start taking these medicines.        Dose/Directions    LORazepam 0.5 MG tablet   Commonly known as:  ATIVAN   Used for:  Claustrophobia   Started by:  Aj Porter MD        Take 1 tablet 30-60 minutes prior to MRI.  Can take the second dose if needed.   Quantity:  2 tablet   Refills:  0            Where to get your medicines      [facility] [address] [place] [address] " [address] AT [facility] [address]  [address], [facility] [address]     Phone:  [phone]     predniSONE 20 MG tablet         Some of these will need a paper prescription and others can be bought over the counter.  Ask your nurse if you have questions.     Bring a paper prescription for each of these medications     LORazepam 0.5 MG tablet                Primary Care Provider Office Phone # Fax #    Ana Gonzales PA-C [phone] [address] [place] [address]        Equal Access to Services     JACK HINES : Hadii aad ku hadasho Soomaali, waaxda luqadaha, qaybta kaalmada adeegyada, waxjose cardona . So [facility] [phone].    ATENCIÓN: Si habla español, tiene a perez disposición servicios gratuitos de asistencia lingüística. [facility] [phone].    We comply with applicable federal civil rights laws and [place] laws. We do not discriminate on the basis of race, color, national origin, age, disability, sex, sexual orientation, or gender identity.            Thank you!     Thank you for choosing [facility]  for your care. Our goal is always to provide you with excellent care. Hearing back from our patients is one way we can continue to improve our services. Please take a few minutes to complete the written survey that you may receive in the mail after your visit with us. Thank you!             Your Updated Medication List - Protect others around you: Learn how to safely use, store and throw away your medicines at www.disposemymeds.org.          This list is accurate as of: 12/13/17 10:22 AM.  Always use your most recent med list.                   Brand Name Dispense Instructions for use Diagnosis    diltiazem 360 MG 24 hr CD capsule    CARDIZEM CD; CARTIA XT    90 capsule    Take 1 capsule (360 mg) by mouth daily Overdue on office visit.    Benign essential hypertension       fluticasone 50 MCG/ACT spray    FLONASE    1 Package     Spray 1-2 sprays into both nostrils daily.        loratadine 10 MG tablet    CLARITIN     Take 10 mg by mouth daily        loratadine-pseudoePHEDrine  MG per 24 hr tablet    CLARITIN-D 24 HOUR    30 tablet    Take 1 tablet by mouth daily    Chronic seasonal allergic rhinitis due to pollen       LORazepam 0.5 MG tablet    ATIVAN    2 tablet    Take 1 tablet 30-60 minutes prior to MRI.  Can take the second dose if needed.    Claustrophobia       order for DME     1 Units    Equipment being ordered: automated home BP cuff    HTN (hypertension)       predniSONE 20 MG tablet    DELTASONE    20 tablet    Take 3 tabs (60 mg) by mouth daily x 3 days, 2 tabs (40 mg) daily x 3 days, 1 tab (20 mg) daily x 3 days, then 1/2 tab (10 mg) x 3 days.    Left wrist pain

## 2017-12-13 NOTE — PROGRESS NOTES
SUBJECTIVE:   Roni Martin is a 51 year old male who presents to clinic today for the following health issues:  Chief Complaint   Patient presents with     Wrist Pain     x 3 weeks, left wrist pain and swelling. No known injury     Pharyngitis     x 2 days, no fever      Imm/Inj     flu vaccine      Health Maintenance     patient will schedule colonoscopy after the Holidays     Musculoskeletal problem/pain      Duration: x 3 weeks    Description  Location: left wrist pain    Intensity:  mild, 8/10 at its worse    Accompanying signs and symptoms: swelling, weakness and throbbing     History  Previous similar problem: YES- on going for a few weeks and had a couple of issues in the past but it always resolved   Previous evaluation:  x-ray    Precipitating or alleviating factors:  Trauma or overuse: YES  Aggravating factors include: lifting, overuse and movement    Therapies tried and outcome: ice did not help, NSAID - ibuprofen and was on Prednisone which seemed to help, but never resolved issue.      Roni was seen in clinic on 11/30 for this left wrist pain.  He had an x-ray that showed a small calcification distal to the ulnar styloid- degenerative vs less likely fracture.  He has not had any trauma, so fracture was felt less likely.  He has a history of gout in the foot, but has not had too much redness in the wrist.  Pain is in the dorsum of the wrist medially and in the center, which is where he also has some focal swelling.      Symptoms improved but did not resolve with prednisone, and pain returned about 3 days after he completed the pred.      ENT Symptoms             Symptoms: cc Present Absent Comment   Fever/Chills   x    Fatigue   x    Muscle Aches   x    Eye Irritation   x    Sneezing   x    Nasal Peyman/Drg  x  Using Flonase and loratadine    Sinus Pressure/Pain   x    Loss of smell   x    Dental pain   x    Sore Throat x x  Mild pain, only with swallowing   Swollen Glands   x    Ear Pain/Fullness   x     Cough   x    Wheeze       Chest Pain   x    Shortness of breath   x    Rash   x    Other         Symptom duration:  x 2 days    Symptom severity:     Treatments tried:  ibuprofen - resolves pain   Contacts:  no known            Problem list and histories reviewed & adjusted, as indicated.  Additional history: as documented    Patient Active Problem List   Diagnosis     Obesity     Benign essential hypertension     Elevated fasting blood sugar     Hyperlipidemia with target LDL less than 130     History reviewed. No pertinent surgical history.    Social History   Substance Use Topics     Smoking status: Never Smoker     Smokeless tobacco: Never Used     Alcohol use Yes     Family History   Problem Relation Age of Onset     Parkinsonism Mother      DIABETES Father      Hypertension Father      Hyperlipidemia No family hx of      HEART DISEASE No family hx of          Current Outpatient Prescriptions   Medication Sig Dispense Refill     loratadine (CLARITIN) 10 MG tablet Take 10 mg by mouth daily       predniSONE (DELTASONE) 20 MG tablet Take 3 tabs (60 mg) by mouth daily x 3 days, 2 tabs (40 mg) daily x 3 days, 1 tab (20 mg) daily x 3 days, then 1/2 tab (10 mg) x 3 days. 20 tablet 0     LORazepam (ATIVAN) 0.5 MG tablet Take 1 tablet 30-60 minutes prior to MRI.  Can take the second dose if needed. 2 tablet 0     diltiazem (CARDIZEM CD; CARTIA XT) 360 MG 24 hr CD capsule Take 1 capsule (360 mg) by mouth daily Overdue on office visit. 90 capsule 3     fluticasone (FLONASE) 50 MCG/ACT nasal spray Spray 1-2 sprays into both nostrils daily. 1 Package 11     loratadine-pseudoePHEDrine (CLARITIN-D 24 HOUR)  MG per 24 hr tablet Take 1 tablet by mouth daily 30 tablet 3     ORDER FOR DME, SET TO LOCAL PRINT, Equipment being ordered: automated home BP cuff 1 Units 0     No Known Allergies      Reviewed and updated as needed this visit by clinical staffTobacco  Allergies  Med Hx  Surg Hx  Fam Hx  Soc Hx      Reviewed  and updated as needed this visit by Provider         ROS:  Constitutional, HEENT, pulmonary, MSK systems are negative, except as otherwise noted.      OBJECTIVE:   BP (!) 142/98 (BP Location: Right arm, Patient Position: Chair, Cuff Size: Adult Regular)  Pulse 73  Temp 97.8  F (36.6  C) (Tympanic)  Wt 228 lb 6.4 oz (103.6 kg)  SpO2 97%  BMI 27.8 kg/m2  Body mass index is 27.8 kg/(m^2).    GENERAL: alert and no distress  EYES: Eyes grossly normal to inspection, PERRL and conjunctivae and sclerae normal  HENT: ear canals and TMs normal, sinuses non tender to percussion, nose and mouth without ulcers or lesions, oropharynx red but no tonsillar exudates  NECK: no adenopathy, no asymmetry, masses, or scars  RESP: lungs clear to auscultation - no rales, rhonchi or wheezes  CV: regular rate and rhythm, normal S1 S2, no S3 or S4, no murmur, click or rub  MSK: Focal swelling and pain to palpation over the central dorsal left wrist but without significant erythema      ASSESSMENT/PLAN:         1. Left wrist pain    X-ray showed calcification in the wrist but this seems unlikely to be a fracture since he had no trauma and he does not have pain to palpation of the plantar wrist in the area, pain is only dorsal.  Has some focal swelling there but no redness, so gout or infection seems less likely.  Will get MRI for more info and resume prednisone in the meantime since that was helpful for pain.  Advised use of wrist brace for comfort.  Likely follow-up with ortho/sports meds pending MRI results if pain still not resolving.      - MR Wrist Left w/o Contrast; Future  - ORTHO  REFERRAL  - predniSONE (DELTASONE) 20 MG tablet; Take 3 tabs (60 mg) by mouth daily x 3 days, 2 tabs (40 mg) daily x 3 days, 1 tab (20 mg) daily x 3 days, then 1/2 tab (10 mg) x 3 days.  Dispense: 20 tablet; Refill: 0    2. Claustrophobia    He endorses significant claustrophobia, so although the MRI will only be of his wrist, I still gave him a  couple lorazepam to use pre-scan.     - LORazepam (ATIVAN) 0.5 MG tablet; Take 1 tablet 30-60 minutes prior to MRI.  Can take the second dose if needed.  Dispense: 2 tablet; Refill: 0    3. Viral pharyngitis    Exam is not suggestive of strep and symptoms are fairly mild, so likely viral.  RST not performed due to low likelihood.   Advised him to follow-up if worsening.      4. Need for prophylactic vaccination and inoculation against influenza    - FLU VAC, SPLIT VIRUS IM > 3 YO (QUADRIVALENT) [26910]  - Vaccine Administration, Initial [06296]    5. Special screening for malignant neoplasms, colon    - GASTROENTEROLOGY ADULT REF PROCEDURE ONLY      Aj Porter MD  [facility]      Injectable Influenza Immunization Documentation    1.  Is the person to be vaccinated sick today?   No    2. Does the person to be vaccinated have an allergy to a component   of the vaccine?   No  Egg Allergy Algorithm Link    3. Has the person to be vaccinated ever had a serious reaction   to influenza vaccine in the past?   No    4. Has the person to be vaccinated ever had Guillain-Barré syndrome?   No    Form completed by Paula BROWN CMA ([facility])

## 2017-12-13 NOTE — TELEPHONE ENCOUNTER
Reason for Call:  Wrist swelling    Detailed comments: patients wife is calling stating that her husbands left wrist is very swoolen, and he has finished the Prednisone that Dr. Aldana ordered for him. Does he need to be re seen?    Phone Number Patient can be reached at: Other phone number:  [phone]    Best Time: any    Can we leave a detailed message on this number? YES   Amaya Villar  [facility] [place] [facility]      Call taken on 12/13/2017 at 7:46 AM by Amaya Villar

## 2017-12-13 NOTE — TELEPHONE ENCOUNTER
"Patient reports;  LOV 11/30/17 for swelling on left wrist, treated with prednisone, swelling clearedup  Last two days the swelling has returned   He has had a bump on his left wrist that he described as \"a bump on his tendon\" he is wondering if this bump is causing the swelling  Denies bruising, fever, symptoms of celulitis   Pain rate 8/10 with movement, throbbing pain  Adl's difficult  Taking Ibuprofen for pain, no relief  Not sleeping due to pain  Applying ice to wrist this am, no relief of swelling or pain  Scheduled appt today    Tanesha WHITT Rn      "

## 2017-12-13 NOTE — PATIENT INSTRUCTIONS
We'll restart the prednisone and get an MRI of the wrist to get more information.  Follow-up with sports medicine if the pain is still not resolving.      Throat pain seems most likely viral based on exam, but if your symptoms are worsening, please return to have a throat swab done.   Viral Pharyngitis (Sore Throat)    You (or your child, if your child is the patient) have pharyngitis (sore throat). This infection is caused by a virus. It can cause throat pain that is worse when swallowing, aching all over, headache, and fever. The infection may be spread by coughing, kissing, or touching others after touching your mouth or nose. Antibiotic medications do not work against viruses, so they are not used for treating this condition.  Home care    If your symptoms are severe, rest at home. Return to work or school when you feel well enough.     Drink plenty of fluids to avoid dehydration.    For children: Use acetaminophen for fever, fussiness or discomfort. In infants over six months of age, you may use ibuprofen instead of acetaminophen. (NOTE: If your child has chronic liver or kidney disease or ever had a stomach ulcer or GI bleeding, talk with your doctor before using these medicines.) (NOTE: Aspirin should never be used in anyone under 18 years of age who is ill with a fever. It may cause severe liver damage.)     For adults: You may use acetaminophen or ibuprofen to control pain or fever, unless another medicine was prescribed for this. (NOTE: If you have chronic liver or kidney disease or ever had a stomach ulcer or GI bleeding, talk with your doctor before using these medicines.)    Throat lozenges or numbing throat sprays can help reduce pain. Gargling with warm salt water will also help reduce throat pain. For this, dissolve 1/2 teaspoon of salt in 1 glass of warm water. To help soothe a sore throat, children can sip on juice or a popsicle. Children 5 years and older can also suck on a lollipop or hard  candy.    Avoid salty or spicy foods, which can be irritating to the throat.  Follow-up care  Follow up with your healthcare provider or our staff if you are not improving over the next week.  When to seek medical advice  Call your healthcare provider right away if any of these occur:    Fever as directed by your doctor.  For children, seek care if:    Your child is of any age and has repeated fevers above 104 F (40 C).    Your child is younger than 2 years of age and has a fever of 100.4 F (38 C) that continues for more than 1 day.    Your child is 2 years old or older and has a fever of 100.4 F (38 C) that continues for more than 3 days.    New or worsening ear pain, sinus pain, or headache    Painful lumps in the back of neck    Stiff neck    Lymph nodes are getting larger    Inability to swallow liquids, excessive drooling, or inability to open mouth wide due to throat pain    Signs of dehydration (very dark urine or no urine, sunken eyes, dizziness)    Trouble breathing or noisy breathing    Muffled voice    New rash    Child appears to be getting sicker  Date Last Reviewed: 4/13/2015 2000-2017 [employer]. [address] [place] [address], [place] [address]. All rights reserved. This information is not intended as a substitute for professional medical care. Always follow your healthcare professional's instructions.

## 2017-12-13 NOTE — NURSING NOTE
"Chief Complaint   Patient presents with     Wrist Pain     x 3 weeks, left wrist pain and swelling. No known injury     Pharyngitis     x 2 days, no fever      Imm/Inj     flu vaccine      Health Maintenance     patient will schedule colonoscopy after the Holidays       Initial BP (!) 137/95 (BP Location: Right arm, Patient Position: Chair, Cuff Size: Adult Regular)  Pulse 73  Temp 97.8  F (36.6  C) (Tympanic)  Wt 228 lb 6.4 oz (103.6 kg)  SpO2 97%  BMI 27.8 kg/m2 Estimated body mass index is 27.8 kg/(m^2) as calculated from the following:    Height as of 11/30/17: 6' 4\" (1.93 m).    Weight as of this encounter: 228 lb 6.4 oz (103.6 kg).  Medication Reconciliation: complete   Paula BROWN CMA ([facility])    "

## 2017-12-14 ENCOUNTER — HOSPITAL ENCOUNTER (OUTPATIENT)
Dept: MRI IMAGING | Facility: CLINIC | Age: 52
Discharge: HOME | End: 2017-12-14
Attending: INTERNAL MEDICINE | Admitting: INTERNAL MEDICINE
Payer: COMMERCIAL

## 2017-12-14 DIAGNOSIS — M25.532 LEFT WRIST PAIN: ICD-10-CM

## 2017-12-14 PROCEDURE — 73221 MRI JOINT UPR EXTREM W/O DYE: CPT | Mod: LT

## 2018-01-03 ENCOUNTER — OFFICE VISIT (OUTPATIENT)
Dept: ORTHOPEDICS | Facility: CLINIC | Age: 53
End: 2018-01-03
Payer: COMMERCIAL

## 2018-01-03 VITALS
DIASTOLIC BLOOD PRESSURE: 88 MMHG | WEIGHT: 233.1 LBS | SYSTOLIC BLOOD PRESSURE: 141 MMHG | HEIGHT: 76 IN | BODY MASS INDEX: 28.38 KG/M2

## 2018-01-03 DIAGNOSIS — M67.80 TENDINOSIS: ICD-10-CM

## 2018-01-03 DIAGNOSIS — M19.039 WRIST ARTHRITIS: Primary | ICD-10-CM

## 2018-01-03 PROCEDURE — 99243 OFF/OP CNSLTJ NEW/EST LOW 30: CPT | Performed by: PEDIATRICS

## 2018-01-03 NOTE — PROGRESS NOTES
Sports Medicine Clinic Visit    PCP: Ana Gonzales Mario is a 52 year old male who is seen  in consultation at the request of  Ana Gonzales M.D. presenting with left wrist pain.    Injury: Patient denies any injury. He reports insidious onset of left wrist swelling and pain over the past month.  He has been on two courses of prednisone with improvement each time.  Now has persistent swelling, but minimal pain.  Last prednisone was 1 weeks ago.  Is helped by a wrist brace.  Had a similar episode ~ 1.5 years ago which resolved.    Location of Pain: left wrist  Duration of Pain: ~1 month(s)  Rating of Pain at worst: 9/10  Rating of Pain Currently: 2/10  Symptoms are better with: Other medications: prednisone, brace  Symptoms are worse with: extension, flexion and especially ulnar and radial deviation  Additional Features:   Positive: swelling, throbbing and aching pain with intermittent sharp pain   Negative: bruising, popping, grinding, catching, locking, instability, paresthesias, numbness and weakness  Other evaluation and/or treatments so far consists of: Ice, Other medications: prednisone and brace  Prior History of related problems: similar problem ~1.5-2 years ago    Social History: Construnction    Review of Systems  Skin: no bruising, yes swelling  Musculoskeletal: as above  Neurologic: no numbness, paresthesias  Remainder of review of systems is negative including constitutional, CV, pulmonary, GI, except as noted in HPI or medical history.    Patient's current problem list, past medical and surgical history, and family history were reviewed.    Patient Active Problem List   Diagnosis     Obesity     Benign essential hypertension     Elevated fasting blood sugar     Hyperlipidemia with target LDL less than 130     No past medical history on file.  No past surgical history on file.  Family History   Problem Relation Age of Onset     Parkinsonism Mother      DIABETES Father       "Hypertension Father      Hyperlipidemia No family hx of      HEART DISEASE No family hx of          Objective  /88 (BP Location: Left arm, Patient Position: Sitting, Cuff Size: Adult Large)  Ht 6' 4\" (1.93 m)  Wt 233 lb 1.6 oz (105.7 kg)  BMI 28.37 kg/m2    GENERAL APPEARANCE: healthy, alert and no distress   GAIT: NORMAL  SKIN: no suspicious lesions or rashes  HEENT: Sclera clear, anicteric  CV: good peripheral pulses  RESP: Breathing not labored  NEURO: Normal strength and tone, mentation intact and speech normal  PSYCH:  mentation appears normal and affect normal/bright    Bilateral Wrist and Hand exam  Inspection:       Swelling: dorsal swelling left wrist, no erythema    Tender:       Mild dorsal wrist    Non Tender:       Remainder of the Wrist and Hand bilateral    ROM:       Decreased active and passive ROM of the wrist bilaterally with flexion and extension  - pain with medial and lateral movements of wrist    Strength:       5/5 strength in the muscles of the hand, wrist and forearm bilateral    Neurovascular:       2+ radial pulses bilaterally with brisk capillary refill and      normal sensation to light touch in the radial, median and ulnar nerve distributions      Radiology  I visualized and reviewed these images with the patient    Recent Results (from the past 744 hour(s))   MR Wrist Left w/o Contrast    Narrative    MR WRIST LEFT WITHOUT CONTRAST December 14, 2017 9:18 AM    HISTORY: Left wrist pain.    TECHNIQUE: Coronal T1, STIR, gradient echo. Sagittal T1. Axial T1 and  T2 fat suppression.    FINDINGS:   Osseous and Cartilaginous Structures: Small cysts or erosions are  noted along the lateral aspect of the triquetrum with mild adjacent  marrow edema which is likely reactive. There may be a small fibrous  coalition bridging the capitate and hamate along the dorsal distal  margins. No adjacent marrow edema is noted. Mild degenerative spurring  is noted at the triquetral pisiform joint " where there is also a small  effusion. Small cyst or erosions are also noted along the ulnar  tuberosity.    Scapholunate and Lunotriquetral Ligaments: No definite tear  identified, although MR arthrography would be the study of choice in  this regard, if indicated clinically.    Triangular Fibrocartilage Complex: No tears of the triangle  fibrocartilage or TFC attachments are identified. Distal radioulnar  joint alignment appears within normal limits.    Extensor Tendons: Increased signal intensity or fluid is noted within  the second and fourth extensor compartment tendon sheaths.  Longitudinal split is noted within the extensor carpi ulnaris tendon  at and distal to the ulnar styloid suggesting mild tendinosis. No  jerome transverse tear is demonstrated. Subcutaneous edema is also  noted over the dorsum of the wrist.    Flexor Tendons: Unremarkable. No tear, tendinosis, or tenosynovitis is  identified.    Joint spaces: Small radiocarpal joint effusion is noted with  intermediate signal intensity synovitis or debris in the joint  recesses.    Additional Findings: The carpal tunnel and median nerve appear  unremarkable. Guyon's canal is unremarkable. No ganglion cyst is  identified.       Impression    IMPRESSION:   1. Nonspecific dorsal subcutaneous edema over the wrist.  2. Second and fourth compartment extensor tendon sheath fluid or  tenosynovitis.  3. Extensor carpi ulnaris tendinosis or partial tear.  4. Suspected lunocapitate fibrous partial syndesmosis along the dorsal  distal margin. This is of uncertain significance. No adjacent marrow  edema is noted.  5. Triquetral pisiform degenerative changes with proximal marginal  spurring and nonspecific joint effusion. Small calcification present  on 11/30/2017 radiographs likely represents a small calcified  articular body.  6. Radiocarpal joint nonspecific effusion with intra-articular  intermediate signal intensity debris or synovitis. Small distal  radioulnar  joint effusion is also noted.  7. Small cysts or erosions along the ulnar tuberosity and ulnar aspect  of the triquetrum.    ALEX SHAFER MD     Final Report   WRIST LEFT THREE OR MORE VIEWS 11/30/2017 8:23 AM     HISTORY: Left wrist pain.    COMPARISON: None.    IMPRESSION: Small calcification noted distal to the ulnar styloid.  This may be degenerative. Acute fracture fragment is possible, though  origin is uncertain. This calcification is small, measuring 3 mm.  Otherwise, alignment intact. No additional areas concerning for  fracture.    MARLO WILKINS MD    Assessment:  1. Wrist arthritis    2. Tendinosis      We discussed the following treatment options: symptom treatment, activity modification/rest, imaging, rehab and referral. Following discussion, plan: will start with OT and consider injection.  Will review images with hand surgery.    Plan:  - Today's Plan of Care:  [phone]    -We also discussed other future treatment options:  US guided injection  Surgical referral    Follow Up: Dr. Estes will call next week    Concerning signs and symptoms were reviewed.  The patient expressed understanding of this management plan and all questions were answered at this time.    Thanks for the opportunity to participate in the care of this patient, I will keep you updated on their progress.    CC: Ana Estes MD [place]  Primary Care Sports Medicine  [facility]

## 2018-01-03 NOTE — LETTER
1/3/2018         RE: Roni Martin  [address]  [place] [address]        Dear Colleague,    Thank you for referring your patient, Roni Martin, to the [facility]. Please see a copy of my visit note below.    Sports Medicine Clinic Visit    PCP: Ana Gonzales    Roni Martin is a 52 year old male who is seen  in consultation at the request of  Ana Gonzales M.D. presenting with left wrist pain.    Injury: Patient denies any injury. He reports insidious onset of left wrist swelling and pain over the past month.  He has been on two courses of prednisone with improvement each time.  Now has persistent swelling, but minimal pain.  Last prednisone was 1 weeks ago.  Is helped by a wrist brace.  Had a similar episode ~ 1.5 years ago which resolved.    Location of Pain: left wrist  Duration of Pain: ~1 month(s)  Rating of Pain at worst: 9/10  Rating of Pain Currently: 2/10  Symptoms are better with: Other medications: prednisone, brace  Symptoms are worse with: extension, flexion and especially ulnar and radial deviation  Additional Features:   Positive: swelling, throbbing and aching pain with intermittent sharp pain   Negative: bruising, popping, grinding, catching, locking, instability, paresthesias, numbness and weakness  Other evaluation and/or treatments so far consists of: Ice, Other medications: prednisone and brace  Prior History of related problems: similar problem ~1.5-2 years ago    Social History: Construnction    Review of Systems  Skin: no bruising, yes swelling  Musculoskeletal: as above  Neurologic: no numbness, paresthesias  Remainder of review of systems is negative including constitutional, CV, pulmonary, GI, except as noted in HPI or medical history.    Patient's current problem list, past medical and surgical history, and family history were reviewed.    Patient Active Problem List   Diagnosis     Obesity     Benign essential hypertension      "Elevated fasting blood sugar     Hyperlipidemia with target LDL less than 130     No past medical history on file.  No past surgical history on file.  Family History   Problem Relation Age of Onset     Parkinsonism Mother      DIABETES Father      Hypertension Father      Hyperlipidemia No family hx of      HEART DISEASE No family hx of          Objective  /88 (BP Location: Left arm, Patient Position: Sitting, Cuff Size: Adult Large)  Ht 6' 4\" (1.93 m)  Wt 233 lb 1.6 oz (105.7 kg)  BMI 28.37 kg/m2    GENERAL APPEARANCE: healthy, alert and no distress   GAIT: NORMAL  SKIN: no suspicious lesions or rashes  HEENT: Sclera clear, anicteric  CV: good peripheral pulses  RESP: Breathing not labored  NEURO: Normal strength and tone, mentation intact and speech normal  PSYCH:  mentation appears normal and affect normal/bright    Bilateral Wrist and Hand exam  Inspection:       Swelling: dorsal swelling left wrist, no erythema    Tender:       Mild dorsal wrist    Non Tender:       Remainder of the Wrist and Hand bilateral    ROM:       Decreased active and passive ROM of the wrist bilaterally with flexion and extension  - pain with medial and lateral movements of wrist    Strength:       5/5 strength in the muscles of the hand, wrist and forearm bilateral    Neurovascular:       2+ radial pulses bilaterally with brisk capillary refill and      normal sensation to light touch in the radial, median and ulnar nerve distributions      Radiology  I visualized and reviewed these images with the patient    Recent Results (from the past 744 hour(s))   MR Wrist Left w/o Contrast    Narrative    MR WRIST LEFT WITHOUT CONTRAST December 14, 2017 9:18 AM    HISTORY: Left wrist pain.    TECHNIQUE: Coronal T1, STIR, gradient echo. Sagittal T1. Axial T1 and  T2 fat suppression.    FINDINGS:   Osseous and Cartilaginous Structures: Small cysts or erosions are  noted along the lateral aspect of the triquetrum with mild adjacent  marrow " edema which is likely reactive. There may be a small fibrous  coalition bridging the capitate and hamate along the dorsal distal  margins. No adjacent marrow edema is noted. Mild degenerative spurring  is noted at the triquetral pisiform joint where there is also a small  effusion. Small cyst or erosions are also noted along the ulnar  tuberosity.    Scapholunate and Lunotriquetral Ligaments: No definite tear  identified, although MR arthrography would be the study of choice in  this regard, if indicated clinically.    Triangular Fibrocartilage Complex: No tears of the triangle  fibrocartilage or TFC attachments are identified. Distal radioulnar  joint alignment appears within normal limits.    Extensor Tendons: Increased signal intensity or fluid is noted within  the second and fourth extensor compartment tendon sheaths.  Longitudinal split is noted within the extensor carpi ulnaris tendon  at and distal to the ulnar styloid suggesting mild tendinosis. No  jerome transverse tear is demonstrated. Subcutaneous edema is also  noted over the dorsum of the wrist.    Flexor Tendons: Unremarkable. No tear, tendinosis, or tenosynovitis is  identified.    Joint spaces: Small radiocarpal joint effusion is noted with  intermediate signal intensity synovitis or debris in the joint  recesses.    Additional Findings: The carpal tunnel and median nerve appear  unremarkable. Guyon's canal is unremarkable. No ganglion cyst is  identified.       Impression    IMPRESSION:   1. Nonspecific dorsal subcutaneous edema over the wrist.  2. Second and fourth compartment extensor tendon sheath fluid or  tenosynovitis.  3. Extensor carpi ulnaris tendinosis or partial tear.  4. Suspected lunocapitate fibrous partial syndesmosis along the dorsal  distal margin. This is of uncertain significance. No adjacent marrow  edema is noted.  5. Triquetral pisiform degenerative changes with proximal marginal  spurring and nonspecific joint effusion. Small  calcification present  on 11/30/2017 radiographs likely represents a small calcified  articular body.  6. Radiocarpal joint nonspecific effusion with intra-articular  intermediate signal intensity debris or synovitis. Small distal  radioulnar joint effusion is also noted.  7. Small cysts or erosions along the ulnar tuberosity and ulnar aspect  of the triquetrum.    ALEX SHAFER MD     Final Report   WRIST LEFT THREE OR MORE VIEWS 11/30/2017 8:23 AM     HISTORY: Left wrist pain.    COMPARISON: None.    IMPRESSION: Small calcification noted distal to the ulnar styloid.  This may be degenerative. Acute fracture fragment is possible, though  origin is uncertain. This calcification is small, measuring 3 mm.  Otherwise, alignment intact. No additional areas concerning for  fracture.    MARLO WILKINS MD    Assessment:  1. Wrist arthritis    2. Tendinosis      We discussed the following treatment options: symptom treatment, activity modification/rest, imaging, rehab and referral. Following discussion, plan: will start with OT and consider injection.  Will review images with hand surgery.    Plan:  - Today's Plan of Care:  [facility] [phone]    -We also discussed other future treatment options:  US guided injection  Surgical referral    Follow Up: Dr. Estes will call next week    Concerning signs and symptoms were reviewed.  The patient expressed understanding of this management plan and all questions were answered at this time.    Thanks for the opportunity to participate in the care of this patient, I will keep you updated on their progress.    CC: Ana Estes MD CAQ  Primary Care Sports Medicine  [facility]    Again, thank you for allowing me to participate in the care of your patient.        Sincerely,        Margarita Estes MD

## 2018-01-03 NOTE — PATIENT INSTRUCTIONS
Plan:  - Today's Plan of Care:  [phone]    -We also discussed other future treatment options:  US guided injection  Surgical referral    Follow Up: Dr. Estes will call next week

## 2018-01-03 NOTE — MR AVS SNAPSHOT
"              After Visit Summary   1/3/2018    Roni Martin    MRN: [id number]           Patient Information     Date Of Birth          1965        Visit Information        Provider Department      1/3/2018 10:40 AM Margarita Estes MD [facility]        Today's Diagnoses     Wrist arthritis    -  1    Tendinosis          Care Instructions    Plan:  - Today's Plan of Care:  Rehab: Occupational Therapy: [facility] - [phone]    -We also discussed other future treatment options:  US guided injection  Surgical referral    Follow Up: Dr. Estes will call next week              Follow-ups after your visit        Additional Services     OCCUPATIONAL THERAPY REFERRAL       *This therapy referral will be filtered to a centralized scheduling office at [facility] and the patient will receive a call to schedule an appointment at a [facility] location most convenient for them. *     [facility] provides Occupational Therapy evaluation and treatment and many specialty services across the [facility] system.  If requesting a specialty program, please choose from the list below.    If you have not heard from the scheduling office within 2 business days, please call [phone] for all locations, with the exception of [place], please call [phone].     Treatment: Evaluation & Treatment  Special Instructions/Modalities: None  Special Programs: None    Please be aware that coverage of these services is subject to the terms and limitations of your health insurance plan.  Call member services at your health plan with any benefit or coverage questions.      **Note to Provider:  If you are referring outside of [facility] for the therapy appointment, please list the name of the location in the \"special instructions\" above, print the referral and give to the patient to schedule the appointment.                  Who to contact     If you have " "questions or need follow up information about today's clinic visit or your schedule please contact [facility] directly at [phone].  Normal or non-critical lab and imaging results will be communicated to you by MyChart, letter or phone within 4 business days after the clinic has received the results. If you do not hear from us within 7 days, please contact the clinic through [employer] or phone. If you have a critical or abnormal lab result, we will notify you by phone as soon as possible.  Submit refill requests through [employer] or call your pharmacy and they will forward the refill request to us. Please allow 3 business days for your refill to be completed.          Additional Information About Your Visit        [employer] Information     [employer] lets you send messages to your doctor, view your test results, renew your prescriptions, schedule appointments and more. To sign up, go to [email or web address] . Click on \"Log in\" on the left side of the screen, which will take you to the Welcome page. Then click on \"Sign up Now\" on the right side of the page.     You will be asked to enter the access code listed below, as well as some personal information. Please follow the directions to create your username and password.     Your access code is: [email or web address]  Expires: 2018  8:40 AM     Your access code will  in 90 days. If you need help or a new code, please call your [facility] clinic or [phone].        Care EveryWhere ID     This is your Care EveryWhere ID. This could be used by other organizations to access your [facility] medical records  [id number]        Your Vitals Were     Height BMI (Body Mass Index)                6' 4\" (1.93 m) 28.37 kg/m2           Blood Pressure from Last 3 Encounters:   18 141/88   17 (!) 142/98   17 130/89    Weight from Last 3 Encounters:   18 233 lb 1.6 oz (105.7 kg)   17 228 lb 6.4 oz (103.6 kg)   17 225 " lb (102.1 kg)              We Performed the Following     OCCUPATIONAL THERAPY REFERRAL        Primary Care Provider Office Phone # Fax #    Ana Gonzales PA-C [phone] [address] [place] [address]        Equal Access to Services     MITCHELLZACKERY DONNY : Hadii aad ku hadricho Soomaali, waaxda luqadaha, qaybta kaalmada adeegyada, waxay idiin haymarían adeeg jorge lalibradojuan pablo hdz. So [facility] [phone].    ATENCIÓN: Si habla español, tiene a perez disposición servicios gratuitos de asistencia lingüística. Llame al [phone].    We comply with applicable federal civil rights laws and [place] laws. We do not discriminate on the basis of race, color, national origin, age, disability, sex, sexual orientation, or gender identity.            Thank you!     Thank you for choosing [facility]  for your care. Our goal is always to provide you with excellent care. Hearing back from our patients is one way we can continue to improve our services. Please take a few minutes to complete the written survey that you may receive in the mail after your visit with us. Thank you!             Your Updated Medication List - Protect others around you: Learn how to safely use, store and throw away your medicines at www.disposemymeds.org.          This list is accurate as of: 1/3/18 11:06 AM.  Always use your most recent med list.                   Brand Name Dispense Instructions for use Diagnosis    diltiazem 360 MG 24 hr CD capsule    CARDIZEM CD; CARTIA XT    90 capsule    Take 1 capsule (360 mg) by mouth daily Overdue on office visit.    Benign essential hypertension       fluticasone 50 MCG/ACT spray    FLONASE    1 Package    Spray 1-2 sprays into both nostrils daily.        loratadine 10 MG tablet    CLARITIN     Take 10 mg by mouth daily        loratadine-pseudoePHEDrine  MG per 24 hr tablet    CLARITIN-D 24 HOUR    30 tablet    Take 1 tablet by mouth daily    Chronic  seasonal allergic rhinitis due to pollen       LORazepam 0.5 MG tablet    ATIVAN    2 tablet    Take 1 tablet 30-60 minutes prior to MRI.  Can take the second dose if needed.    Claustrophobia       order for DME     1 Units    Equipment being ordered: automated home BP cuff    HTN (hypertension)       predniSONE 20 MG tablet    DELTASONE    20 tablet    Take 3 tabs (60 mg) by mouth daily x 3 days, 2 tabs (40 mg) daily x 3 days, 1 tab (20 mg) daily x 3 days, then 1/2 tab (10 mg) x 3 days.    Left wrist pain

## 2018-01-05 ENCOUNTER — TELEPHONE (OUTPATIENT)
Dept: ORTHOPEDICS | Facility: CLINIC | Age: 53
End: 2018-01-05

## 2018-01-05 DIAGNOSIS — M25.532 LEFT WRIST PAIN: Primary | ICD-10-CM

## 2018-01-05 NOTE — TELEPHONE ENCOUNTER
Please call patient.    Reviewed wrist MRI with Hand Specialist.    Given significant inflammation, I would recommend lab work up for inflammatory conditions with possible Rheumatology referral pending results.  Labs are ordered (CBC, ESR, CRP, RF, MIN, Uric Acid), please tell patient how to schedule.    In the interim, patient can continue bracing and OT and can schedule a wrist steroid injection under US guidance with Dr. Serna if desired.    Would have patient schedule follow 2 weeks post injection to review course.  Will call with lab updates in the interim.    Margarita Estes MD

## 2018-01-05 NOTE — LETTER
January 10, 2018      Roni Martin  [address]  [place] [address]        Dear Roni,     We have made 3 attempts to contact you regarding recommended treatment following the MRI of the wrist.  Please contact the clinic for further information or schedule an appointment to further discuss.      Sincerely,          Margarita Estes MD

## 2018-01-05 NOTE — TELEPHONE ENCOUNTER
Mailbox full, unable to leave message.  Will attempt to reach patient again.    Briana Still, ATC

## 2018-01-10 NOTE — TELEPHONE ENCOUNTER
Mailbox full, unable to leave message. Letter written and mailed to patient asking him to contact FS for information.     Thu Perales, ATC

## 2018-08-01 DIAGNOSIS — I10 BENIGN ESSENTIAL HYPERTENSION: ICD-10-CM

## 2018-08-01 NOTE — TELEPHONE ENCOUNTER
"Requested Prescriptions   Pending Prescriptions Disp Refills     diltiazem (CARDIZEM CD; CARTIA XT) 360 MG 24 hr CD capsule [Pharmacy Med Name: DILTIAZEM ER 360MG (24 HR/CD) CAPS] 90 capsule 0     Sig: TAKE ONE CAPSULE BY MOUTH DAILY    Calcium Channel Blockers Protocol  Failed    8/1/2018  6:12 PM       Failed - Blood pressure under 140/90 in past 12 months    BP Readings from Last 3 Encounters:   01/03/18 141/88   12/13/17 (!) 142/98   11/30/17 130/89                Failed - Normal ALT in past 12 months    No lab results found.         Failed - Recent (12 mo) or future (30 days) visit within the authorizing provider's specialty    Patient had office visit in the last 12 months or has a visit in the next 30 days with authorizing provider or within the authorizing provider's specialty.  See \"Patient Info\" tab in inbasket, or \"Choose Columns\" in Meds & Orders section of the refill encounter.           Failed - Normal serum creatinine on file in past 12 months    Recent Labs   Lab Test  05/18/15   0940   CR  0.91            Passed - Patient is age 18 or older        Last Written Prescription Date:  7/26/17  Last Fill Quantity: 90,  # refills: 3   Last office visit: 12/13/2017 with prescribing provider:     Future Office Visit:      "

## 2018-08-02 RX ORDER — DILTIAZEM HYDROCHLORIDE 360 MG/1
CAPSULE, EXTENDED RELEASE ORAL
Qty: 90 CAPSULE | Refills: 0 | Status: SHIPPED | OUTPATIENT
Start: 2018-08-02 | End: 2018-11-13

## 2018-11-13 DIAGNOSIS — I10 BENIGN ESSENTIAL HYPERTENSION: ICD-10-CM

## 2018-11-14 RX ORDER — DILTIAZEM HYDROCHLORIDE 360 MG/1
CAPSULE, EXTENDED RELEASE ORAL
Qty: 30 CAPSULE | Refills: 0 | Status: SHIPPED | OUTPATIENT
Start: 2018-11-14 | End: 2018-12-17

## 2018-11-14 NOTE — TELEPHONE ENCOUNTER
"Requested Prescriptions   Pending Prescriptions Disp Refills     diltiazem (CARDIZEM CD; CARTIA XT) 360 MG 24 hr CD capsule [Pharmacy Med Name: DILTIAZEM ER 360MG (24 HR/CD) CAPS] 90 capsule 0     Sig: TAKE ONE CAPSULE BY MOUTH DAILY    Calcium Channel Blockers Protocol  Failed    11/13/2018  4:28 PM       Failed - Blood pressure under 140/90 in past 12 months    BP Readings from Last 3 Encounters:   01/03/18 141/88   12/13/17 (!) 142/98   11/30/17 130/89                Failed - Normal ALT in past 12 months    No lab results found.         Failed - Recent (12 mo) or future (30 days) visit within the authorizing provider's specialty    Patient had office visit in the last 12 months or has a visit in the next 30 days with authorizing provider or within the authorizing provider's specialty.  See \"Patient Info\" tab in inbasket, or \"Choose Columns\" in Meds & Orders section of the refill encounter.      .Last Written Prescription Date:  8/2/18  Last Fill Quantity: 90,  # refills: 0   Last office visit: 12/13/2017 with prescribing provider:     Future Office Visit:               Failed - Normal serum creatinine on file in past 12 months    Recent Labs   Lab Test  05/18/15   0940   CR  0.91            Passed - Patient is age 18 or older          "

## 2018-12-12 DIAGNOSIS — J30.1 CHRONIC SEASONAL ALLERGIC RHINITIS DUE TO POLLEN: ICD-10-CM

## 2018-12-12 RX ORDER — LORATADINE 10 MG
TABLET ORAL
Qty: 30 TABLET | Refills: 2 | Status: SHIPPED | OUTPATIENT
Start: 2018-12-12 | End: 2019-03-29

## 2018-12-12 NOTE — TELEPHONE ENCOUNTER
Requested Prescriptions   Pending Prescriptions Disp Refills     WAL-ITIN D 24 HOUR  MG 24 hr tablet [Pharmacy Med Name: WAL-ITIN D 24 HOUR TABLETS 15'S]  Last Written Prescription Date:  Historical  Last Fill Quantity: 0,  # refills: 0   Last office visit: 12/13/2017 with prescribing provider:  German   Future Office Visit:     30 tablet 0     Sig: TAKE 1 TABLET BY MOUTH EVERY DAY    There is no refill protocol information for this order

## 2018-12-12 NOTE — TELEPHONE ENCOUNTER
A couple of refills printed.  He has not been seen in clinic for a year, so should follow-up prior to further refills.  Thanks.    Aj Porter MD

## 2018-12-17 DIAGNOSIS — I10 BENIGN ESSENTIAL HYPERTENSION: ICD-10-CM

## 2018-12-17 RX ORDER — DILTIAZEM HYDROCHLORIDE 360 MG/1
CAPSULE, EXTENDED RELEASE ORAL
Qty: 30 CAPSULE | Refills: 0 | Status: SHIPPED | OUTPATIENT
Start: 2018-12-17 | End: 2019-01-15

## 2018-12-17 NOTE — TELEPHONE ENCOUNTER
"Requested Prescriptions   Pending Prescriptions Disp Refills     diltiazem ER COATED BEADS (CARDIZEM CD) 360 MG 24 hr capsule [Pharmacy Med Name: DILTIAZEM ER 360MG (24 HR/CD) CAPS] 90 capsule 0     Sig: TAKE ONE CAPSULE BY MOUTH DAILY    Calcium Channel Blockers Protocol  Failed - 12/17/2018  3:26 PM       Failed - Blood pressure under 140/90 in past 12 months    BP Readings from Last 3 Encounters:   01/03/18 141/88   12/13/17 (!) 142/98   11/30/17 130/89                Failed - Normal ALT in past 12 months    No lab results found.         Failed - Recent (12 mo) or future (30 days) visit within the authorizing provider's specialty    Patient had office visit in the last 12 months or has a visit in the next 30 days with authorizing provider or within the authorizing provider's specialty.  See \"Patient Info\" tab in inbasket, or \"Choose Columns\" in Meds & Orders section of the refill encounter.             Failed - Normal serum creatinine on file in past 12 months    Recent Labs   Lab Test 05/18/15  0940   CR 0.91            Passed - Patient is age 18 or older          "

## 2019-01-15 DIAGNOSIS — I10 BENIGN ESSENTIAL HYPERTENSION: ICD-10-CM

## 2019-01-15 RX ORDER — DILTIAZEM HYDROCHLORIDE 360 MG/1
CAPSULE, EXTENDED RELEASE ORAL
Qty: 30 CAPSULE | Refills: 0 | Status: SHIPPED | OUTPATIENT
Start: 2019-01-15 | End: 2019-02-19

## 2019-01-15 NOTE — TELEPHONE ENCOUNTER
"Requested Prescriptions   Pending Prescriptions Disp Refills     diltiazem ER COATED BEADS (CARDIZEM CD) 360 MG 24 hr capsule [Pharmacy Med Name: DILTIAZEM ER 360MG (24 HR/CD) CAPS] 30 capsule 0     Sig: TAKE ONE CAPSULE BY MOUTH EVERY DAY    Calcium Channel Blockers Protocol  Failed - 1/15/2019 10:39 AM       Failed - Blood pressure under 140/90 in past 12 months    BP Readings from Last 3 Encounters:   01/03/18 141/88   12/13/17 (!) 142/98   11/30/17 130/89                Failed - Normal ALT in past 12 months    No lab results found.         Failed - Recent (12 mo) or future (30 days) visit within the authorizing provider's specialty    Patient had office visit in the last 12 months or has a visit in the next 30 days with authorizing provider or within the authorizing provider's specialty.  See \"Patient Info\" tab in inbasket, or \"Choose Columns\" in Meds & Orders section of the refill encounter.      Last Written Prescription Date:  12/17/18  Last Fill Quantity: 30,  # refills: 0   Last office visit: 12/13/2017 with prescribing provider:     Future Office Visit:               Failed - Normal serum creatinine on file in past 12 months    Recent Labs   Lab Test 05/18/15  0940   CR 0.91            Passed - Medication is active on med list       Passed - Patient is age 18 or older          "

## 2019-02-19 DIAGNOSIS — I10 BENIGN ESSENTIAL HYPERTENSION: ICD-10-CM

## 2019-02-19 RX ORDER — DILTIAZEM HYDROCHLORIDE 360 MG/1
CAPSULE, EXTENDED RELEASE ORAL
Qty: 30 CAPSULE | Refills: 0 | Status: SHIPPED | OUTPATIENT
Start: 2019-02-19 | End: 2019-03-20

## 2019-02-19 NOTE — TELEPHONE ENCOUNTER
"Requested Prescriptions   Pending Prescriptions Disp Refills     diltiazem ER COATED BEADS (CARDIZEM CD) 360 MG 24 hr capsule [Pharmacy Med Name: DILTIAZEM ER 360MG (24 HR/CD) CAPS] 30 capsule 0     Sig: TAKE 1 CAPSULE BY MOUTH DAILY    Calcium Channel Blockers Protocol  Failed - 2/19/2019 10:21 AM       Failed - Blood pressure under 140/90 in past 12 months    BP Readings from Last 3 Encounters:   01/03/18 141/88   12/13/17 (!) 142/98   11/30/17 130/89                Failed - Normal ALT in past 12 months    No lab results found.         Failed - Recent (12 mo) or future (30 days) visit within the authorizing provider's specialty    Patient had office visit in the last 12 months or has a visit in the next 30 days with authorizing provider or within the authorizing provider's specialty.  See \"Patient Info\" tab in inbasket, or \"Choose Columns\" in Meds & Orders section of the refill encounter.             Failed - Normal serum creatinine on file in past 12 months    Recent Labs   Lab Test 05/18/15  0940   CR 0.91            Passed - Medication is active on med list       Passed - Patient is age 18 or older        Last Written Prescription Date:  1/15/19  Last Fill Quantity: 30,  # refills: 0   Last office visit: 12/13/2017 with prescribing provider:     Future Office Visit:      "

## 2019-03-19 ENCOUNTER — NURSE TRIAGE (OUTPATIENT)
Dept: NURSING | Facility: CLINIC | Age: 54
End: 2019-03-19

## 2019-03-19 ENCOUNTER — TELEPHONE (OUTPATIENT)
Dept: FAMILY MEDICINE | Facility: CLINIC | Age: 54
End: 2019-03-19

## 2019-03-19 DIAGNOSIS — I10 BENIGN ESSENTIAL HYPERTENSION: ICD-10-CM

## 2019-03-19 NOTE — TELEPHONE ENCOUNTER
"Patient calling for medication refill. Called clinic for refill today but was denied due to length of time since last visit.     Calling for urgent refill. Transferred to scheduling for first available appointment.    Protocol and care advice reviewed  Caller states understanding of the recommended disposition  Advised to call back if further questions or concerns      Reason for Disposition    [1] Request for URGENT new prescription or refill of \"essential\" medication (i.e., likelihood of harm to patient if not taken) AND [2] triager unable to fill per unit policy    Protocols used: MEDICATION QUESTION CALL-ADULT-      "

## 2019-03-19 NOTE — TELEPHONE ENCOUNTER
Reason for Call:  Other prescription    Detailed comments: Patient states that he has to leave to [place] tomorrow and is unable to come in for an appointment until he returns. He would like a week's worth of his Diltiazem medication for when he's gone until he comes back. Is this possible? Please advise at your earliest convenience. Thank you.    Phone Number Patient can be reached at: Home number on file [phone] (home)    Best Time: Anytime, asap.    Can we leave a detailed message on this number? YES    Call taken on 3/19/2019 at 5:56 PM by Tor Bullard

## 2019-03-20 RX ORDER — DILTIAZEM HYDROCHLORIDE 360 MG/1
360 CAPSULE, EXTENDED RELEASE ORAL DAILY
Qty: 7 CAPSULE | Refills: 0 | Status: SHIPPED | OUTPATIENT
Start: 2019-03-20 | End: 2019-03-29

## 2019-03-29 ENCOUNTER — OFFICE VISIT (OUTPATIENT)
Dept: FAMILY MEDICINE | Facility: CLINIC | Age: 54
End: 2019-03-29
Payer: COMMERCIAL

## 2019-03-29 VITALS
RESPIRATION RATE: 16 BRPM | WEIGHT: 236 LBS | HEART RATE: 62 BPM | BODY MASS INDEX: 28.74 KG/M2 | DIASTOLIC BLOOD PRESSURE: 78 MMHG | HEIGHT: 76 IN | SYSTOLIC BLOOD PRESSURE: 122 MMHG | TEMPERATURE: 97.7 F

## 2019-03-29 DIAGNOSIS — R73.01 ELEVATED FASTING BLOOD SUGAR: ICD-10-CM

## 2019-03-29 DIAGNOSIS — I10 BENIGN ESSENTIAL HYPERTENSION: ICD-10-CM

## 2019-03-29 DIAGNOSIS — J30.2 SEASONAL ALLERGIC RHINITIS, UNSPECIFIED TRIGGER: ICD-10-CM

## 2019-03-29 DIAGNOSIS — E78.5 DYSLIPIDEMIA: ICD-10-CM

## 2019-03-29 DIAGNOSIS — Z12.11 SPECIAL SCREENING FOR MALIGNANT NEOPLASMS, COLON: Primary | ICD-10-CM

## 2019-03-29 DIAGNOSIS — Z11.59 NEED FOR HEPATITIS C SCREENING TEST: ICD-10-CM

## 2019-03-29 DIAGNOSIS — Z11.4 ENCOUNTER FOR SCREENING FOR HIV: ICD-10-CM

## 2019-03-29 LAB
CHOLEST SERPL-MCNC: 272 MG/DL
CREAT SERPL-MCNC: 0.91 MG/DL (ref 0.66–1.25)
GFR SERPL CREATININE-BSD FRML MDRD: >90 ML/MIN/{1.73_M2}
HBA1C MFR BLD: 5.3 % (ref 0–5.6)
HDLC SERPL-MCNC: 81 MG/DL
LDLC SERPL CALC-MCNC: 175 MG/DL
NONHDLC SERPL-MCNC: 191 MG/DL
TRIGL SERPL-MCNC: 82 MG/DL

## 2019-03-29 PROCEDURE — 86803 HEPATITIS C AB TEST: CPT | Performed by: PHYSICIAN ASSISTANT

## 2019-03-29 PROCEDURE — 83036 HEMOGLOBIN GLYCOSYLATED A1C: CPT | Performed by: PHYSICIAN ASSISTANT

## 2019-03-29 PROCEDURE — 80061 LIPID PANEL: CPT | Performed by: PHYSICIAN ASSISTANT

## 2019-03-29 PROCEDURE — 87389 HIV-1 AG W/HIV-1&-2 AB AG IA: CPT | Performed by: PHYSICIAN ASSISTANT

## 2019-03-29 PROCEDURE — 36415 COLL VENOUS BLD VENIPUNCTURE: CPT | Performed by: PHYSICIAN ASSISTANT

## 2019-03-29 PROCEDURE — 82565 ASSAY OF CREATININE: CPT | Performed by: PHYSICIAN ASSISTANT

## 2019-03-29 PROCEDURE — 99214 OFFICE O/P EST MOD 30 MIN: CPT | Performed by: PHYSICIAN ASSISTANT

## 2019-03-29 RX ORDER — DILTIAZEM HYDROCHLORIDE 360 MG/1
360 CAPSULE, EXTENDED RELEASE ORAL DAILY
Qty: 90 CAPSULE | Refills: 3 | Status: SHIPPED | OUTPATIENT
Start: 2019-03-29 | End: 2020-03-17

## 2019-03-29 RX ORDER — FLUTICASONE PROPIONATE 50 MCG
SPRAY, SUSPENSION (ML) NASAL
Qty: 48 ML | Refills: 0 | Status: SHIPPED | OUTPATIENT
Start: 2019-03-29 | End: 2019-06-23

## 2019-03-29 RX ORDER — FLUTICASONE PROPIONATE 50 MCG
1-2 SPRAY, SUSPENSION (ML) NASAL DAILY
Qty: 18.2 ML | Refills: 11 | Status: SHIPPED | OUTPATIENT
Start: 2019-03-29 | End: 2019-03-29

## 2019-03-29 ASSESSMENT — MIFFLIN-ST. JEOR: SCORE: 2006.99

## 2019-03-29 NOTE — LETTER
April 3, 2019      Roni Martin  [address]  [place] [address]        Dear ,    We are writing to inform you of your test results.    Cholesterol is very high.  This does not need medication at this time, but does need to be treated with healthy lifestyle.  Several things can help your cholesterol.  Suggestions include weight loss of even 5-10 pounds.  See me if you want help with losing weight.  Otherwise, even if you do not lose weight, healthy eating and physical activity can help.  A good physical activity goal for heart health is 30 minutes of moderate activity (such as walking at a speed which gets your heart rate up or your breathing up) 5 days per week. For healthy eating, consider the Mediterranean diet.  More information of this diet can be found at [email or web address]/art-55835378.  As always, contact me or see me if you would like more information on these things.  Let's plan to recheck your cholesterol in 6-12 months.      Other labs all look fine.    Resulted Orders   Hepatitis C Screen Reflex to HCV RNA Quant and Genotype   Result Value Ref Range    Hepatitis C Antibody Nonreactive NR^Nonreactive      Comment:      Assay performance characteristics have not been established for newborns,   infants, and children     HIV Antigen Antibody Combo   Result Value Ref Range    HIV Antigen Antibody Combo Nonreactive NR^Nonreactive          Comment:      HIV-1 p24 Ag & HIV-1/HIV-2 Ab Not Detected   Hemoglobin A1c   Result Value Ref Range    Hemoglobin A1C 5.3 0 - 5.6 %      Comment:      Normal <5.7% Prediabetes 5.7-6.4%  Diabetes 6.5% or higher - adopted from ADA   consensus guidelines.     Creatinine   Result Value Ref Range    Creatinine 0.91 0.66 - 1.25 mg/dL    GFR Estimate >90 >60 mL/min/[1.73_m2]      Comment:      Non  GFR Calc  Starting 12/18/2018, serum creatinine based estimated GFR (eGFR)  will be   calculated using the Chronic Kidney Disease Epidemiology Collaboration   (CKD-EPI) equation.      GFR Estimate If Black >90 >60 mL/min/[1.73_m2]      Comment:       GFR Calc  Starting 12/18/2018, serum creatinine based estimated GFR (eGFR) will be   calculated using the Chronic Kidney Disease Epidemiology Collaboration   (CKD-EPI) equation.     Lipid panel reflex to direct LDL Non-fasting   Result Value Ref Range    Cholesterol 272 (H) <200 mg/dL      Comment:      Desirable:       <200 mg/dl    Triglycerides 82 <150 mg/dL      Comment:      Non Fasting    HDL Cholesterol 81 >39 mg/dL    LDL Cholesterol Calculated 175 (H) <100 mg/dL      Comment:      Above desirable:  100-129 mg/dl  Borderline High:  130-159 mg/dL  High:             160-189 mg/dL  Very high:       >189 mg/dl      Non HDL Cholesterol 191 (H) <130 mg/dL      Comment:      Above Desirable:  130-159 mg/dl  Borderline high:  160-189 mg/dl  High:             190-219 mg/dl  Very high:       >219 mg/dl         If you have any questions or concerns, please call the clinic at the number listed above.       Sincerely,        Ana Gonzales PA-C

## 2019-03-29 NOTE — PROGRESS NOTES
"  SUBJECTIVE:   Roni Martin is a 53 year old male who presents to clinic today for the following health issues:      Hypertension Follow-up      Outpatient blood pressures are not being checked.      Low Salt Diet: no added salt      Amount of exercise or physical activity: 4-5 days/week for an average of 30-45 minutes    Problems taking medications regularly: No    Medication side effects: none    Diet: regular (no restrictions)    High at dentist a few weeks ago when having tooth pulled.  Has home cuff that he's not sure he trusts.    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    Notes allergies get bad, was told not to take claritin D but is only thing that works.  He does use flonase as well.      Due for colon screen.    No urinary symptoms and no fam history prostate cancer.    Problem list and histories reviewed & adjusted, as indicated.  Additional history: as documented    BP Readings from Last 3 Encounters:   03/29/19 122/78   01/03/18 141/88   12/13/17 (!) 142/98    Wt Readings from Last 3 Encounters:   03/29/19 107 kg (236 lb)   01/03/18 105.7 kg (233 lb 1.6 oz)   12/13/17 103.6 kg (228 lb 6.4 oz)        Labs reviewed in EPIC    Reviewed and updated as needed this visit by clinical staff  Tobacco  Allergies  Meds  Problems  Med Hx  Surg Hx  Fam Hx  Soc Hx        Reviewed and updated as needed this visit by Provider  Tobacco  Allergies  Meds  Problems  Med Hx  Surg Hx  Fam Hx  Soc Hx          ROS:  Constitutional, HEENT, cardiovascular, pulmonary, GI, , musculoskeletal, neuro, skin, endocrine and psych systems are negative, except as otherwise noted.    OBJECTIVE:     /78 (BP Location: Right arm, Patient Position: Chair, Cuff Size: Adult Large)   Pulse 62   Temp 97.7  F (36.5  C) (Tympanic)   Resp 16   Ht 1.93 m (6' 4\")   Wt 107 kg (236 lb)   BMI 28.73 kg/m    Body mass index is 28.73 kg/m .  GENERAL: healthy, alert and no distress  RESP: lungs clear " to auscultation - no rales, rhonchi or wheezes  CV: regular rate and rhythm, normal S1 S2, no S3 or S4, no murmur, click or rub    ASSESSMENT/PLAN:       ICD-10-CM    1. Special screening for malignant neoplasms, colon Z12.11 Fecal colorectal cancer screen (FIT)   2. Benign essential hypertension I10 Creatinine     diltiazem ER COATED BEADS (CARDIZEM CD) 360 MG 24 hr capsule   3. Seasonal allergic rhinitis, unspecified trigger J30.2 loratadine-pseudoePHEDrine (CLARITIN-D 12-HOUR) 5-120 MG 12 hr tablet     fluticasone (FLONASE) 50 MCG/ACT nasal spray   4. Elevated fasting blood sugar R73.01 Hemoglobin A1c   5. Dyslipidemia E78.5 Lipid panel reflex to direct LDL Non-fasting   6. Need for hepatitis C screening test Z11.59 Hepatitis C Screen Reflex to HCV RNA Quant and Genotype   7. 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  claritin D is used as needed, works day it is taken (does not have to build up)  flonase use discussed - use opposite hand for opposite nostril - takes several weeks for full effect.    Do mail the poop test    Recheck yearly if doing well      Ana Gonzales PA-C  [facility]

## 2019-03-29 NOTE — NURSING NOTE
"Chief Complaint   Patient presents with     Hypertension       Initial /83 (BP Location: Right arm, Patient Position: Chair, Cuff Size: Adult Large)   Pulse 62   Temp 97.7  F (36.5  C) (Tympanic)   Resp 16   Ht 1.93 m (6' 4\")   Wt 107 kg (236 lb)   BMI 28.73 kg/m   Estimated body mass index is 28.73 kg/m  as calculated from the following:    Height as of this encounter: 1.93 m (6' 4\").    Weight as of this encounter: 107 kg (236 lb).    Patient presents to the clinic using No DME    Health Maintenance that is potentially due pending provider review:  Colonoscopy/FIT    Gave pt phone number/pended order to schedule mammo and/or colonoscopy(or FIT)    Is there anyone who you would like to be able to receive your results? No  If yes have patient fill out MARSHA      "

## 2019-03-29 NOTE — TELEPHONE ENCOUNTER
"Requested Prescriptions   Pending Prescriptions Disp Refills     fluticasone (FLONASE) 50 MCG/ACT nasal spray [Pharmacy Med Name: FLUTICASONE 50MCG NASAL SP (120) RX] 48 mL 11     Sig: SHAKE LIQUID AND USE 1 TO 2 SPRAYS IN EACH NOSTRIL DAILY    Inhaled Steroids Protocol Passed - 3/29/2019  9:40 AM       Passed - Patient is age 12 or older       Passed - Recent (12 mo) or future (30 days) visit within the authorizing provider's specialty    Patient had office visit in the last 12 months or has a visit in the next 30 days with authorizing provider or within the authorizing provider's specialty.  See \"Patient Info\" tab in inbasket, or \"Choose Columns\" in Meds & Orders section of the refill encounter.             Passed - Medication is active on med list        fluticasone (FLONASE) 50 MCG/ACT nasal spray  Last Written Prescription Date:  03/29/2019  Last Fill Quantity: 18.2mL,  # refills: 11   Last office visit: 3/29/2019 with prescribing provider:  12/13/2017 HAYDER Gonzales   Future Office Visit:   Next 5 appointments (look out 90 days)    Apr 03, 2019  8:40 AM CDT  SHORT with Ana Gonzales PA-C  [facility] ([facility] [address] [place] [id number] [phone]         Dory SALOMON (R) (M)    "

## 2019-03-29 NOTE — PATIENT INSTRUCTIONS
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  claritin D is used as needed, works day it is taken (does not have to build up)  flonase use discussed - use opposite hand for opposite nostril - takes several weeks for full effect.    Do mail the poop test    Recheck yearly if doing well

## 2019-03-30 LAB
HCV AB SERPL QL IA: NONREACTIVE
HIV 1+2 AB+HIV1 P24 AG SERPL QL IA: NONREACTIVE

## 2019-04-03 ENCOUNTER — OFFICE VISIT (OUTPATIENT)
Dept: FAMILY MEDICINE | Facility: CLINIC | Age: 54
End: 2019-04-03
Payer: COMMERCIAL

## 2019-04-03 VITALS
BODY MASS INDEX: 28.61 KG/M2 | HEART RATE: 80 BPM | SYSTOLIC BLOOD PRESSURE: 124 MMHG | TEMPERATURE: 98.7 F | RESPIRATION RATE: 20 BRPM | WEIGHT: 235 LBS | DIASTOLIC BLOOD PRESSURE: 74 MMHG

## 2019-04-03 DIAGNOSIS — L91.8 SKIN TAG: Primary | ICD-10-CM

## 2019-04-03 PROCEDURE — 11200 RMVL SKIN TAGS UP TO&INC 15: CPT | Performed by: PHYSICIAN ASSISTANT

## 2019-04-03 NOTE — NURSING NOTE
"Chief Complaint   Patient presents with     Skin Tags       Initial /74 (BP Location: Right arm, Patient Position: Chair, Cuff Size: Adult Large)   Pulse 80   Temp 98.7  F (37.1  C) (Tympanic)   Resp 20   Wt 106.6 kg (235 lb)   BMI 28.61 kg/m   Estimated body mass index is 28.61 kg/m  as calculated from the following:    Height as of 3/29/19: 1.93 m (6' 4\").    Weight as of this encounter: 106.6 kg (235 lb).    Patient presents to the clinic using No DME    Health Maintenance that is potentially due pending provider review:  Colonoscopy/FIT    Patient has FIT at home, will complete.    Is there anyone who you would like to be able to receive your results? No  If yes have patient fill out MARSHA  Martha Ward M.A.        "

## 2019-04-03 NOTE — PROGRESS NOTES
SUBJECTIVE:   Roni Martin is a 53 year old male who presents to clinic today for the following health issues:    Skin tags  Onset: quite a while     Description:   Location: Eye lids   Character: raised  Itching (Pruritis): YES- sometimes    Progression of Symptoms:  same    Accompanying Signs & Symptoms:  Fever: no   Body aches or joint pain: no   Sore throat symptoms: no   Recent cold symptoms: no     History:   Has never had them taken off.      Precipitating factors:     Started with one and now has more    Alleviating factors:  Na     Therapies Tried and outcome: na     Bothersome skin tags on R eyelid.    Problem list and histories reviewed & adjusted, as indicated.  Additional history: as documented    Reviewed and updated as needed this visit by clinical staff  Tobacco  Allergies  Meds  Problems  Med Hx  Surg Hx  Fam Hx  Soc Hx        Reviewed and updated as needed this visit by Provider  Tobacco  Allergies  Meds  Problems  Med Hx  Surg Hx  Fam Hx  Soc Hx          ROS:  Constitutional, skin systems are negative, except as otherwise noted.    OBJECTIVE:     /74 (BP Location: Right arm, Patient Position: Chair, Cuff Size: Adult Large)   Pulse 80   Temp 98.7  F (37.1  C) (Tympanic)   Resp 20   Wt 106.6 kg (235 lb)   BMI 28.61 kg/m    Body mass index is 28.61 kg/m .  GENERAL: healthy, alert and no distress  SKIN: R eye lid with 4 skin tags  ASSESSMENT/PLAN:       ICD-10-CM    1. Skin tag L91.8 REMOVAL OF SKIN TAGS, FIRST 15     Pros and cons of procedure discussed.  These were cleansed with alcohol wipe.  Ice was then applied to anesthetize.  4 skin tags were removed from R eye lid using double sided razor.  EBL < 1 ml.  Gauze applied.  Patient tolerated procedure well.  Patient Instructions   No specific care needed   Call if questions      Ana Gonzales PA-C  [facility]

## 2019-04-08 PROCEDURE — 82274 ASSAY TEST FOR BLOOD FECAL: CPT | Performed by: PHYSICIAN ASSISTANT

## 2019-04-10 DIAGNOSIS — Z12.11 SPECIAL SCREENING FOR MALIGNANT NEOPLASMS, COLON: ICD-10-CM

## 2019-04-10 LAB — HEMOCCULT STL QL IA: NEGATIVE

## 2019-04-10 NOTE — LETTER
April 11, 2019      Roni URVASHI Mario  [address]  [place] [address]        Dear ,    We are writing to inform you of your test results.    .  Normal colon cancer screening test for blood in stool.  Repeat test yearly unless you decide to do colonoscopy instead    Resulted Orders   Fecal colorectal cancer screen (FIT)   Result Value Ref Range    Occult Blood Scn FIT Negative NEG^Negative       If you have any questions or concerns, please call the clinic at the number listed above.       Sincerely,        Ana Gonzales PA-C/[place]

## 2019-06-23 DIAGNOSIS — J30.2 SEASONAL ALLERGIC RHINITIS, UNSPECIFIED TRIGGER: ICD-10-CM

## 2019-06-24 RX ORDER — FLUTICASONE PROPIONATE 50 MCG
SPRAY, SUSPENSION (ML) NASAL
Qty: 48 ML | Refills: 0 | Status: SHIPPED | OUTPATIENT
Start: 2019-06-24 | End: 2020-03-13

## 2019-06-24 NOTE — TELEPHONE ENCOUNTER
"Requested Prescriptions   Pending Prescriptions Disp Refills     fluticasone (FLONASE) 50 MCG/ACT nasal spray [Pharmacy Med Name: FLUTICASONE 50MCG NASAL SP (120) RX] 48 mL 0     Sig: SHAKE LIQUID AND USE 1 TO 2 SPRAYS IN EACH NOSTRIL DAILY       Inhaled Steroids Protocol Passed - 6/23/2019  3:37 PM        Passed - Patient is age 12 or older        Passed - Recent (12 mo) or future (30 days) visit within the authorizing provider's specialty     Patient had office visit in the last 12 months or has a visit in the next 30 days with authorizing provider or within the authorizing provider's specialty.  See \"Patient Info\" tab in inbasket, or \"Choose Columns\" in Meds & Orders section of the refill encounter.              Passed - Medication is active on med list        Last Written Prescription Date:  3/28/19  Last Fill Quantity: 48 ml,  # refills: 0   Last office visit: 4/3/2019 with prescribing provider:     Future Office Visit:      "

## 2019-11-03 ENCOUNTER — HOSPITAL ENCOUNTER (EMERGENCY)
Facility: CLINIC | Age: 54
Discharge: HOME | End: 2019-11-03
Attending: EMERGENCY MEDICINE | Admitting: EMERGENCY MEDICINE
Payer: COMMERCIAL

## 2019-11-03 ENCOUNTER — APPOINTMENT (OUTPATIENT)
Dept: GENERAL RADIOLOGY | Facility: CLINIC | Age: 54
End: 2019-11-03
Attending: EMERGENCY MEDICINE
Payer: COMMERCIAL

## 2019-11-03 VITALS
BODY MASS INDEX: 31.83 KG/M2 | SYSTOLIC BLOOD PRESSURE: 132 MMHG | DIASTOLIC BLOOD PRESSURE: 99 MMHG | HEART RATE: 89 BPM | WEIGHT: 235 LBS | RESPIRATION RATE: 20 BRPM | OXYGEN SATURATION: 98 % | HEIGHT: 72 IN

## 2019-11-03 DIAGNOSIS — S22.42XA CLOSED FRACTURE OF MULTIPLE RIBS OF LEFT SIDE, INITIAL ENCOUNTER: ICD-10-CM

## 2019-11-03 PROCEDURE — 25000132 ZZH RX MED GY IP 250 OP 250 PS 637: Performed by: EMERGENCY MEDICINE

## 2019-11-03 PROCEDURE — 96372 THER/PROPH/DIAG INJ SC/IM: CPT

## 2019-11-03 PROCEDURE — 99284 EMERGENCY DEPT VISIT MOD MDM: CPT | Mod: Z6 | Performed by: EMERGENCY MEDICINE

## 2019-11-03 PROCEDURE — 99284 EMERGENCY DEPT VISIT MOD MDM: CPT | Mod: 25

## 2019-11-03 PROCEDURE — 25000128 H RX IP 250 OP 636: Performed by: EMERGENCY MEDICINE

## 2019-11-03 PROCEDURE — 71046 X-RAY EXAM CHEST 2 VIEWS: CPT

## 2019-11-03 RX ORDER — MORPHINE SULFATE 10 MG/ML
10 INJECTION, SOLUTION INTRAMUSCULAR; INTRAVENOUS
Status: COMPLETED | OUTPATIENT
Start: 2019-11-03 | End: 2019-11-03

## 2019-11-03 RX ORDER — OXYCODONE HYDROCHLORIDE 5 MG/1
5 TABLET ORAL EVERY 6 HOURS PRN
Qty: 25 TABLET | Refills: 0 | Status: SHIPPED | OUTPATIENT
Start: 2019-11-03 | End: 2019-11-20

## 2019-11-03 RX ORDER — OXYCODONE HCL 10 MG/1
10 TABLET, FILM COATED, EXTENDED RELEASE ORAL ONCE
Status: COMPLETED | OUTPATIENT
Start: 2019-11-03 | End: 2019-11-03

## 2019-11-03 RX ORDER — ONDANSETRON 4 MG/1
4 TABLET, ORALLY DISINTEGRATING ORAL ONCE
Status: COMPLETED | OUTPATIENT
Start: 2019-11-03 | End: 2019-11-03

## 2019-11-03 RX ORDER — LIDOCAINE 4 G/G
1 PATCH TOPICAL ONCE
Status: DISCONTINUED | OUTPATIENT
Start: 2019-11-03 | End: 2019-11-04 | Stop reason: HOSPADM

## 2019-11-03 RX ADMIN — ONDANSETRON 4 MG: 4 TABLET, ORALLY DISINTEGRATING ORAL at 20:08

## 2019-11-03 RX ADMIN — OXYCODONE HYDROCHLORIDE 10 MG: 10 TABLET, FILM COATED, EXTENDED RELEASE ORAL at 22:06

## 2019-11-03 RX ADMIN — LIDOCAINE 1 PATCH: 560 PATCH PERCUTANEOUS; TOPICAL; TRANSDERMAL at 20:08

## 2019-11-03 RX ADMIN — MORPHINE SULFATE 10 MG: 10 INJECTION INTRAVENOUS at 20:11

## 2019-11-03 ASSESSMENT — MIFFLIN-ST. JEOR: SCORE: 1938.95

## 2019-11-03 ASSESSMENT — ENCOUNTER SYMPTOMS: FEVER: 0

## 2019-11-03 NOTE — ED AVS SNAPSHOT
[facility] Emergency Department  [address] [address]  Phone:  [phone]  Fax:  [phone]                                    Roni Martin   MRN: [id number]    Department:  [facility] Emergency Department   Date of Visit:  11/3/2019           After Visit Summary Signature Page    I have received my discharge instructions, and my questions have been answered. I have discussed any challenges I see with this plan with the nurse or doctor.    ..........................................................................................................................................  Patient/Patient Representative Signature      ..........................................................................................................................................  Patient Representative Print Name and Relationship to Patient    ..................................................               ................................................  Date                                   Time    ..........................................................................................................................................  Reviewed by Signature/Title    ...................................................              ..............................................  Date                                               Time          22EPIC Rev 08/18

## 2019-11-04 NOTE — ED TRIAGE NOTES
"Pt in back yard, smoking fish, tripped and fell onto chest/side onto a retaining wall. Pt reports left sided chest pain, \"I think I broke my ribs\". Pt reports he is breathing okay, denies SOB. Pt denies hitting his head. No LOC.   "

## 2019-11-04 NOTE — ED NOTES
disch info reviewed with pt and spouse sent with printed RX pt wishes to ambulate out of ED, pt has  home.

## 2019-11-04 NOTE — ED NOTES
Pt SaO2 decreased to 87% on RA, pt denies feeling more SOB, states he isn't able to take deep breath due to pain. o2 applied at 3L with improvement. Pt states morphine is starting to help pain 8/10.

## 2019-11-04 NOTE — ED PROVIDER NOTES
History     Chief Complaint   Patient presents with     Fall     Rib Pain     HPI  Roni Martin is a 53 year old male who presents with left rib pain.  The patient was carrying some smoked fish in the back yard and tripped and fell on to a retaining wall. Hit his left side. Did not his head or neck, no LOC. Hurts to breathe. Denies other injuries. Came here after. No fevers. No cough.     Allergies:  No Known Allergies    Problem List:    Patient Active Problem List    Diagnosis Date Noted     Dyslipidemia 06/22/2015     Priority: Medium      in March 2019.       Obesity 05/18/2015     Priority: Medium     Benign essential hypertension 05/18/2015     Priority: Medium     Trying lifestyle.         Elevated fasting blood sugar 05/18/2015     Priority: Medium        Past Medical History:    No past medical history on file.    Past Surgical History:    No past surgical history on file.    Family History:    Family History   Problem Relation Age of Onset     Parkinsonism Mother      Diabetes Father      Hypertension Father      Hyperlipidemia No family hx of      Heart Disease No family hx of        Social History:  Marital Status:   [2]  Social History     Tobacco Use     Smoking status: Never Smoker     Smokeless tobacco: Never Used   Substance Use Topics     Alcohol use: Yes     Drug use: No        Medications:    oxyCODONE (ROXICODONE) 5 MG tablet  diltiazem ER COATED BEADS (CARDIZEM CD) 360 MG 24 hr capsule  fluticasone (FLONASE) 50 MCG/ACT nasal spray  loratadine (CLARITIN) 10 MG tablet  loratadine-pseudoePHEDrine (CLARITIN-D 12-HOUR) 5-120 MG 12 hr tablet  ORDER FOR DME, SET TO LOCAL PRINT,          Review of Systems   Constitutional: Negative for fever.   Respiratory:        Pain with breathing  Rib pain   All other systems reviewed and are negative.      Physical Exam   BP: (!) 160/117  Pulse: 67  Resp: 20  Height: 182.9 cm (6')  Weight: 106.6 kg (235 lb)  SpO2: 92 %      Physical  Exam  Constitutional:       Appearance: He is not ill-appearing or diaphoretic.   HENT:      Head: Normocephalic and atraumatic.      Right Ear: External ear normal.      Left Ear: External ear normal.      Nose: No congestion.      Mouth/Throat:      Mouth: Mucous membranes are moist.      Pharynx: No posterior oropharyngeal erythema.   Eyes:      General:         Right eye: No discharge.         Left eye: No discharge.   Cardiovascular:      Rate and Rhythm: Normal rate.   Pulmonary:      Effort: No respiratory distress.      Comments: B/l breath sounds  ttp over left ribs around 6/7/8.  Small amount of ecchymoses in same  No crepitus    Abdominal:      General: There is no distension.      Palpations: There is no mass.      Tenderness: There is no tenderness.      Hernia: No hernia is present.   Musculoskeletal:         General: No swelling.   Skin:     Capillary Refill: Capillary refill takes less than 2 seconds.      Coloration: Skin is not jaundiced or pale.   Neurological:      General: No focal deficit present.      Cranial Nerves: No cranial nerve deficit.   Psychiatric:         Mood and Affect: Mood normal.         ED Course       1002 - ribs 7 and 8 fractures on left. No ptx. I spent a good deal of time educating the patient about rib fractures.  Feeling better after morphine.  I gave him a prescription for oxycodone for pain.  I discouraged him from hunting, smoking fish, or lifting heavy objects.  Also cautioned him about narcotic addiction, avoiding driving while using narcotics, avoiding alcohol, and not mixing with other medications that are depressants.  I did encourage him to ambulate and try to take deep breaths.  I also encouraged him to buy some Icy[employer] or salonpas lidocaine patches.  Gave him strict return precautions.  He has bilateral breath sound at this time.  I encouraged outpatient follow-up as well.  I am going to discharge him at this time.       Procedures        Results for orders  placed or performed during the hospital encounter of 11/03/19 (from the past 24 hour(s))   XR Chest 2 Views    Narrative    CHEST TWO VIEWS  11/3/2019 8:30 PM     HISTORY:  Fall. Left lower rib pain.    COMPARISON: None.      Impression    IMPRESSION: There are displaced acute appearing fractures of the left  seventh and eighth ribs posterolaterally. The lungs are clear. No  pneumothorax. No pleural effusions.     CAROLYNE NAGEL MD       Medications   Lidocaine (LIDOCARE) 4 % Patch 1 patch (1 patch Transdermal Given 11/3/19 2008)   lidocaine patch REMOVAL (has no administration in time range)   lidocaine patch in PLACE (has no administration in time range)   oxyCODONE (oxyCONTIN) 12 hr tablet 10 mg (has no administration in time range)   Morphine Sulfate (PF) injection 10 mg (10 mg Intramuscular Given 11/3/19 2011)   ondansetron (ZOFRAN-ODT) ODT tab 4 mg (4 mg Oral Given 11/3/19 2008)       Assessments & Plan (with Medical Decision Making)       Rib fractures vs contusions. Will get cxr. Morphine for pain. Getting a ride home.  No indication for head imaging.    I have reviewed the nursing notes.    I have reviewed the findings, diagnosis, plan and need for follow up with the patient.      New Prescriptions    OXYCODONE (ROXICODONE) 5 MG TABLET    Take 1 tablet (5 mg) by mouth every 6 hours as needed for pain       Final diagnoses:   Closed fracture of multiple ribs of left side, initial encounter       11/3/2019   [facility] EMERGENCY DEPARTMENT     Frankie Nassar MD  11/03/19 [id number]

## 2019-11-04 NOTE — DISCHARGE INSTRUCTIONS
You were seen today for rib pain after fall.  Appears that you broke 2 ribs on your left side.  These will heal over several weeks.  I would avoid hunting and smoking fish until they heal fully.  I prescribed you a medication called oxycodone for your pain.  I understand you have taken this before.  Please do not drive or drink alcohol while taking it.  It is very poor you her pain is properly controlled while you are is healed.  If you do not have proper pain control, you may develop a pneumonia because you are not breathing adequately.  I would also recommend buying over-the-counter lidocaine patches.  [employer] makes a good brand.  You should apply these to where your ribs hurt every day.  Please return if you have any worsening pain, cough, difficulty breathing, fever, or any new symptoms that are causing you concern.

## 2019-11-05 ENCOUNTER — HOSPITAL ENCOUNTER (EMERGENCY)
Facility: CLINIC | Age: 54
Discharge: HOME | End: 2019-11-05
Attending: EMERGENCY MEDICINE | Admitting: EMERGENCY MEDICINE
Payer: COMMERCIAL

## 2019-11-05 ENCOUNTER — APPOINTMENT (OUTPATIENT)
Dept: GENERAL RADIOLOGY | Facility: CLINIC | Age: 54
End: 2019-11-05
Attending: EMERGENCY MEDICINE
Payer: COMMERCIAL

## 2019-11-05 VITALS
RESPIRATION RATE: 32 BRPM | HEART RATE: 75 BPM | BODY MASS INDEX: 32.51 KG/M2 | OXYGEN SATURATION: 90 % | SYSTOLIC BLOOD PRESSURE: 156 MMHG | HEIGHT: 72 IN | DIASTOLIC BLOOD PRESSURE: 96 MMHG | TEMPERATURE: 98 F | WEIGHT: 240 LBS

## 2019-11-05 DIAGNOSIS — R07.89 CHEST WALL PAIN: ICD-10-CM

## 2019-11-05 DIAGNOSIS — S22.42XD CLOSED FRACTURE OF MULTIPLE RIBS OF LEFT SIDE WITH ROUTINE HEALING, SUBSEQUENT ENCOUNTER: ICD-10-CM

## 2019-11-05 LAB
ALBUMIN SERPL-MCNC: 3.9 G/DL (ref 3.4–5)
ALP SERPL-CCNC: 76 U/L (ref 40–150)
ALT SERPL W P-5'-P-CCNC: 30 U/L (ref 0–70)
ANION GAP SERPL CALCULATED.3IONS-SCNC: 8 MMOL/L (ref 3–14)
AST SERPL W P-5'-P-CCNC: 29 U/L (ref 0–45)
BASOPHILS # BLD AUTO: 0.1 10E9/L (ref 0–0.2)
BASOPHILS NFR BLD AUTO: 1.3 %
BILIRUB SERPL-MCNC: 0.2 MG/DL (ref 0.2–1.3)
BUN SERPL-MCNC: 7 MG/DL (ref 7–30)
CALCIUM SERPL-MCNC: 9.1 MG/DL (ref 8.5–10.1)
CHLORIDE SERPL-SCNC: 101 MMOL/L (ref 94–109)
CO2 SERPL-SCNC: 28 MMOL/L (ref 20–32)
CREAT SERPL-MCNC: 0.77 MG/DL (ref 0.66–1.25)
DIFFERENTIAL METHOD BLD: ABNORMAL
EOSINOPHIL # BLD AUTO: 0.2 10E9/L (ref 0–0.7)
EOSINOPHIL NFR BLD AUTO: 3 %
ERYTHROCYTE [DISTWIDTH] IN BLOOD BY AUTOMATED COUNT: 12.1 % (ref 10–15)
GFR SERPL CREATININE-BSD FRML MDRD: >90 ML/MIN/{1.73_M2}
GLUCOSE SERPL-MCNC: 101 MG/DL (ref 70–99)
HCT VFR BLD AUTO: 43.5 % (ref 40–53)
HGB BLD-MCNC: 14.5 G/DL (ref 13.3–17.7)
IMM GRANULOCYTES # BLD: 0.1 10E9/L (ref 0–0.4)
IMM GRANULOCYTES NFR BLD: 0.8 %
LYMPHOCYTES # BLD AUTO: 1.7 10E9/L (ref 0.8–5.3)
LYMPHOCYTES NFR BLD AUTO: 27 %
MCH RBC QN AUTO: 33.4 PG (ref 26.5–33)
MCHC RBC AUTO-ENTMCNC: 33.3 G/DL (ref 31.5–36.5)
MCV RBC AUTO: 100 FL (ref 78–100)
MONOCYTES # BLD AUTO: 0.6 10E9/L (ref 0–1.3)
MONOCYTES NFR BLD AUTO: 9.2 %
NEUTROPHILS # BLD AUTO: 3.7 10E9/L (ref 1.6–8.3)
NEUTROPHILS NFR BLD AUTO: 58.7 %
NRBC # BLD AUTO: 0 10*3/UL
NRBC BLD AUTO-RTO: 0 /100
PLATELET # BLD AUTO: 255 10E9/L (ref 150–450)
POTASSIUM SERPL-SCNC: 3.3 MMOL/L (ref 3.4–5.3)
PROT SERPL-MCNC: 7.9 G/DL (ref 6.8–8.8)
RBC # BLD AUTO: 4.34 10E12/L (ref 4.4–5.9)
SODIUM SERPL-SCNC: 137 MMOL/L (ref 133–144)
TROPONIN I SERPL-MCNC: <0.015 UG/L (ref 0–0.04)
WBC # BLD AUTO: 6.3 10E9/L (ref 4–11)

## 2019-11-05 PROCEDURE — 99284 EMERGENCY DEPT VISIT MOD MDM: CPT | Mod: 25

## 2019-11-05 PROCEDURE — 25000132 ZZH RX MED GY IP 250 OP 250 PS 637: Performed by: EMERGENCY MEDICINE

## 2019-11-05 PROCEDURE — 93010 ELECTROCARDIOGRAM REPORT: CPT | Mod: Z6 | Performed by: EMERGENCY MEDICINE

## 2019-11-05 PROCEDURE — 80053 COMPREHEN METABOLIC PANEL: CPT | Performed by: FAMILY MEDICINE

## 2019-11-05 PROCEDURE — 84484 ASSAY OF TROPONIN QUANT: CPT | Performed by: FAMILY MEDICINE

## 2019-11-05 PROCEDURE — 85025 COMPLETE CBC W/AUTO DIFF WBC: CPT | Performed by: FAMILY MEDICINE

## 2019-11-05 PROCEDURE — 93005 ELECTROCARDIOGRAM TRACING: CPT

## 2019-11-05 PROCEDURE — 99284 EMERGENCY DEPT VISIT MOD MDM: CPT | Mod: 25 | Performed by: EMERGENCY MEDICINE

## 2019-11-05 PROCEDURE — 71046 X-RAY EXAM CHEST 2 VIEWS: CPT

## 2019-11-05 RX ORDER — ACETAMINOPHEN 325 MG/1
975 TABLET ORAL ONCE
Status: COMPLETED | OUTPATIENT
Start: 2019-11-05 | End: 2019-11-05

## 2019-11-05 RX ADMIN — ACETAMINOPHEN 975 MG: 325 TABLET, FILM COATED ORAL at 23:17

## 2019-11-05 ASSESSMENT — MIFFLIN-ST. JEOR: SCORE: 1961.63

## 2019-11-05 NOTE — ED AVS SNAPSHOT
[facility] Emergency Department  [address] [address]  Phone:  [phone]  Fax:  [phone]                                    Roni Martin   MRN: [id number]    Department:  [facility] Emergency Department   Date of Visit:  11/5/2019           After Visit Summary Signature Page    I have received my discharge instructions, and my questions have been answered. I have discussed any challenges I see with this plan with the nurse or doctor.    ..........................................................................................................................................  Patient/Patient Representative Signature      ..........................................................................................................................................  Patient Representative Print Name and Relationship to Patient    ..................................................               ................................................  Date                                   Time    ..........................................................................................................................................  Reviewed by Signature/Title    ...................................................              ..............................................  Date                                               Time          22EPIC Rev 08/18

## 2019-11-06 NOTE — ED TRIAGE NOTES
Fell and broke 2 ribs on left side last night. Now having chest pain x 30 mins. States the pain is different

## 2019-11-06 NOTE — DISCHARGE INSTRUCTIONS
Please see your primary care provider in 2 to 3 days for follow-up appointment.  Use your incentive spirometer every hour while awake.  Will be important to help keep your lungs expanded to help prevent pneumonia.  If you develop fever, chills, worsening chest pain, or shortness of breath, please return to emergency department immediately for further evaluation.    You may take 600 mg ibuprofen every 8 hours and or 650 mg acetaminophen as needed for pain. Do not take more than 4000 mg of acetaminophen per day.

## 2019-11-10 ASSESSMENT — ENCOUNTER SYMPTOMS
CHILLS: 0
BACK PAIN: 1
ABDOMINAL DISTENTION: 0
EYES NEGATIVE: 1
CHEST TIGHTNESS: 0
DIAPHORESIS: 0
SHORTNESS OF BREATH: 0
FEVER: 0
DYSURIA: 0
COUGH: 0
ABDOMINAL PAIN: 0
COLOR CHANGE: 1

## 2019-11-10 NOTE — ED PROVIDER NOTES
History     Chief Complaint   Patient presents with     Chest Pain     HPI  Roni Martin is a 53 year old male with history of known rib fractures, hypertension, dyslipidemia, who presents with worsening chest wall pain and is concerned about his heart. Had fall two days prior in which he broke a rib. Tonight pain feels different and his now in his lower anterior chest wall. Has been treating pain with ibuprofen as well as oxycodone when needed. Does not like the way oxycodone makes him feel. Pain worsens with movement, improves with rest, not associated with dyspnea, nausea, or diaphoresis, moderate severity. No new trauma. Denies fever, chills, cough, or abdominal pain.     The patient's PMHx, Surgical Hx, Allergies, and Medications were all reviewed with the patient.    Allergies:  No Known Allergies    Problem List:    Patient Active Problem List    Diagnosis Date Noted     Dyslipidemia 06/22/2015     Priority: Medium      in March 2019.       Obesity 05/18/2015     Priority: Medium     Benign essential hypertension 05/18/2015     Priority: Medium     Trying lifestyle.         Elevated fasting blood sugar 05/18/2015     Priority: Medium        Past Medical History:    No past medical history on file.    Past Surgical History:    No past surgical history on file.    Family History:    Family History   Problem Relation Age of Onset     Parkinsonism Mother      Diabetes Father      Hypertension Father      Hyperlipidemia No family hx of      Heart Disease No family hx of        Social History:  Marital Status:   [2]  Social History     Tobacco Use     Smoking status: Never Smoker     Smokeless tobacco: Never Used   Substance Use Topics     Alcohol use: Yes     Drug use: No        Medications:    diltiazem ER COATED BEADS (CARDIZEM CD) 360 MG 24 hr capsule  fluticasone (FLONASE) 50 MCG/ACT nasal spray  loratadine (CLARITIN) 10 MG tablet  loratadine-pseudoePHEDrine (CLARITIN-D 12-HOUR) 5-120 MG 12 hr  tablet  ORDER FOR DME, SET TO LOCAL PRINT,          Review of Systems   Constitutional: Negative for chills, diaphoresis and fever.   HENT: Negative for congestion.    Eyes: Negative.    Respiratory: Negative for cough, chest tightness and shortness of breath.    Cardiovascular: Positive for chest pain.   Gastrointestinal: Negative for abdominal distention and abdominal pain.   Genitourinary: Negative for dysuria.   Musculoskeletal: Positive for back pain.   Skin: Positive for color change.       Physical Exam   BP: (!) 154/97  Pulse: 82  Heart Rate: 69  Temp: 98  F (36.7  C)  Resp: 20  Height: 182.9 cm (6')  Weight: 108.9 kg (240 lb)  SpO2: 93 %    Physical Exam  GEN: Awake, alert, and cooperative. Appears mildly distressed.   HENT: MMM. External ears and nose normal bilaterally. Atraumatic.  EYES: EOM intact. Conjunctiva clear. PEERL. No discharge.   NECK: Supple, symmetric. Non tender  CV : Regular rate and rhythm. Brisk capillary refill. No murmurs appreciated.   PULM: Normal effort. No wheezes, rales, or rhonchi bilaterally.  CHEST: no crepitus. ttp left lateral chest wall. Ecchymosis in mid/posterior axillary line.   BACK: no midline tenderness. No CVA tenderness.   ABD: Soft, non-tender, non-distended. No rebound or guarding.   NEURO: Normal speech. Following commands. CN II-XII grossly intact. Patient answering questions and interacting appropriately.   EXT: No gross deformity. Warm and well perfused  INT: Warm. No diaphoresis. Normal color.        ED Course        Procedures             EKG Interpretation:      Interpreted by Naveen Collins MD  Time reviewed: 2140  Symptoms at time of EKG: chest pain  Rhythm: normal sinus   Rate: normal  Axis: normal  Ectopy: none  Conduction: normal  ST Segments/ T Waves: No ST-T wave changes  Q Waves: none  Comparison to prior: no significant change compared to 5/18/2015.     Clinical Impression: normal EKG        Critical Care time:  none               No results  found for this or any previous visit (from the past 24 hour(s)).    Medications   acetaminophen (TYLENOL) tablet 975 mg (975 mg Oral Given 11/5/19 7700)       Assessments & Plan (with Medical Decision Making)   53 year old male with past medical history of left posterior fracture of ribs 6-8, hypertension, dyslipidemia with worsening chest pain in setting of recent rib fractures (2 days). On arrival to Emergency Department, vital signs were notable only for BP of 156/96. He appeared mildly distressed. Pain is reproducible and I have a low suspicion for ACS. Heart score of 2, no acute ischemia on ECG and troponin is below level of detection. CXR without infiltrate or pneumothorax. Atelectasis in bilateral bases and likely small effusion and redomonostration of posterior left sided fractures of ribs 6-8. He took ibuprofen a few hours prior to arrival, doesn't like the way oxycodone makes him feel, therefore was given 975 mg acetaminophen for pain control. He is splinting when breathing, atelectasis on CXR, concern for hypoinflation and possible development of pneumonia. He was given an incentive spirometer and instructed on its use. Plan for him to follow up with his primary care provider in 2-3 days. Strict ED return precautions given.     He expresses agreement with and understanding of plan.     I have reviewed the nursing notes.         HEART Score  Background  Calculates the overall risk of adverse event in patient's presenting with chest pain.  Based on 5 criteria (each assigned 0-2 points) including suspiciousness of history, EKG, age, risk factors and troponin.    Data  53 year old male  has Obesity; Benign essential hypertension; Elevated fasting blood sugar; and Dyslipidemia on their problem list.   reports that he has never smoked. He has never used smokeless tobacco.  family history includes Diabetes in his father; Hypertension in his father; Parkinsonism in his mother.  Lab Results   Component Value Date     TROPI <0.015 11/05/2019     Criteria   0-2 points for each of 5 items (maximum of 10 points):  Score 0- History slightly suspicious for coronary syndrome  Score 0- EKG Normal  Score 1- Age 45 to 65 years old  Score 1- One to 2 risk factors for atherosclerotic disease  Score 0- Within normal limits for troponin levels  Interpretation  Risk of adverse outcome  Heart Score: 2  Total Score 0-3- Adverse Outcome Risk 2.5% - Supports early discharge with appropriate follow-up        Discharge Medication List as of 11/5/2019 11:05 PM          Final diagnoses:   Chest wall pain   Closed fracture of multiple ribs of left side with routine healing, subsequent encounter     Naveen Collins MD    11/5/2019   [facility] EMERGENCY DEPARTMENT    Disclaimer: This note consists of words and symbols derived from keyboarding and dictation using voice recognition software.  As a result, there may be errors that have gone undetected.  Please consider this when interpreting information found in this note.     Naveen Collins MD  11/10/19 1441

## 2019-11-20 ENCOUNTER — ANCILLARY PROCEDURE (OUTPATIENT)
Dept: GENERAL RADIOLOGY | Facility: CLINIC | Age: 54
End: 2019-11-20
Attending: NURSE PRACTITIONER
Payer: COMMERCIAL

## 2019-11-20 ENCOUNTER — OFFICE VISIT (OUTPATIENT)
Dept: FAMILY MEDICINE | Facility: CLINIC | Age: 54
End: 2019-11-20
Payer: COMMERCIAL

## 2019-11-20 VITALS
WEIGHT: 236.6 LBS | OXYGEN SATURATION: 96 % | DIASTOLIC BLOOD PRESSURE: 82 MMHG | HEART RATE: 91 BPM | TEMPERATURE: 97.9 F | BODY MASS INDEX: 32.05 KG/M2 | RESPIRATION RATE: 18 BRPM | SYSTOLIC BLOOD PRESSURE: 120 MMHG | HEIGHT: 72 IN

## 2019-11-20 DIAGNOSIS — Z23 NEED FOR PROPHYLACTIC VACCINATION AND INOCULATION AGAINST INFLUENZA: ICD-10-CM

## 2019-11-20 DIAGNOSIS — J90 PLEURAL EFFUSION: ICD-10-CM

## 2019-11-20 DIAGNOSIS — S22.42XG CLOSED FRACTURE OF MULTIPLE RIBS OF LEFT SIDE WITH DELAYED HEALING, SUBSEQUENT ENCOUNTER: Primary | ICD-10-CM

## 2019-11-20 DIAGNOSIS — J18.9 PNEUMONIA OF LEFT LOWER LOBE DUE TO INFECTIOUS ORGANISM: ICD-10-CM

## 2019-11-20 PROCEDURE — 90682 RIV4 VACC RECOMBINANT DNA IM: CPT | Performed by: NURSE PRACTITIONER

## 2019-11-20 PROCEDURE — 90471 IMMUNIZATION ADMIN: CPT | Performed by: NURSE PRACTITIONER

## 2019-11-20 PROCEDURE — 99215 OFFICE O/P EST HI 40 MIN: CPT | Mod: 25 | Performed by: NURSE PRACTITIONER

## 2019-11-20 PROCEDURE — 71101 X-RAY EXAM UNILAT RIBS/CHEST: CPT | Mod: LT

## 2019-11-20 RX ORDER — LEVOFLOXACIN 750 MG/1
750 TABLET, FILM COATED ORAL DAILY
Qty: 5 TABLET | Refills: 0 | Status: SHIPPED | OUTPATIENT
Start: 2019-11-20 | End: 2019-12-20

## 2019-11-20 RX ORDER — IBUPROFEN 600 MG/1
600 TABLET, FILM COATED ORAL EVERY 8 HOURS PRN
Qty: 60 TABLET | Refills: 0 | Status: SHIPPED | OUTPATIENT
Start: 2019-11-20 | End: 2023-02-07

## 2019-11-20 RX ORDER — OXYCODONE HYDROCHLORIDE 5 MG/1
5-10 TABLET ORAL
Qty: 30 TABLET | Refills: 0 | Status: SHIPPED | OUTPATIENT
Start: 2019-11-20 | End: 2019-12-05

## 2019-11-20 RX ORDER — ACETAMINOPHEN 325 MG/1
325-650 TABLET ORAL EVERY 6 HOURS PRN
Qty: 60 TABLET | Refills: 0 | Status: SHIPPED | OUTPATIENT
Start: 2019-11-20 | End: 2022-05-25

## 2019-11-20 ASSESSMENT — MIFFLIN-ST. JEOR: SCORE: 1946.21

## 2019-11-20 ASSESSMENT — PAIN SCALES - GENERAL: PAINLEVEL: MODERATE PAIN (5)

## 2019-11-20 NOTE — PROGRESS NOTES
Subjective     Roni Martin is a 53 year old male who presents to clinic today for the following health issues:    HPI   ED/UC Followup:    Facility:  [facility] [place]  Date of visit: 11/05/2019  Reason for visit: Chest pain   Current Status: Patient states that he feels he is slowly improving         Patient Active Problem List   Diagnosis     Obesity     Benign essential hypertension     Elevated fasting blood sugar     Dyslipidemia     History reviewed. No pertinent surgical history.    Social History     Tobacco Use     Smoking status: Never Smoker     Smokeless tobacco: Never Used   Substance Use Topics     Alcohol use: Yes     Family History   Problem Relation Age of Onset     Parkinsonism Mother      Diabetes Father      Hypertension Father      Hyperlipidemia No family hx of      Heart Disease No family hx of          Current Outpatient Medications   Medication Sig Dispense Refill     diltiazem ER COATED BEADS (CARDIZEM CD) 360 MG 24 hr capsule Take 1 capsule (360 mg) by mouth daily 90 capsule 3     fluticasone (FLONASE) 50 MCG/ACT nasal spray SHAKE LIQUID AND USE 1 TO 2 SPRAYS IN EACH NOSTRIL DAILY 48 mL 0     loratadine (CLARITIN) 10 MG tablet Take 10 mg by mouth daily       loratadine-pseudoePHEDrine (CLARITIN-D 12-HOUR) 5-120 MG 12 hr tablet Take 1 tablet by mouth 2 times daily Must check BPs and stop this medicine if BP high. 60 tablet 3     ORDER FOR DME, SET TO LOCAL PRINT, Equipment being ordered: automated home BP cuff 1 Units 0     No Known Allergies  Recent Labs   Lab Test 11/05/19  2131 03/29/19  0932 05/18/15  0940   A1C  --  5.3  --    LDL  --  175* 159*   HDL  --  81 86   TRIG  --  82 58   ALT 30  --   --    CR 0.77 0.91 0.91   GFRESTIMATED >90 >90 87   GFRESTBLACK >90 >90 >90  African American GFR Calc     POTASSIUM 3.3*  --  4.6   TSH  --   --  1.53      BP Readings from Last 3 Encounters:   11/20/19 120/82   11/05/19 (!) 156/96   11/03/19 (!) 132/99    Wt Readings from Last 3 Encounters:    11/20/19 107.3 kg (236 lb 9.6 oz)   11/05/19 108.9 kg (240 lb)   11/03/19 106.6 kg (235 lb)                 Reviewed and updated as needed this visit by Provider         Review of Systems   ROS COMP: Constitutional, HEENT, cardiovascular, pulmonary, gi and gu systems are negative, except as otherwise noted.      Objective    /82 (BP Location: Right arm, Patient Position: Sitting, Cuff Size: Adult Large)   Pulse 91   Temp 97.9  F (36.6  C) (Tympanic)   Resp 18   Ht 1.829 m (6')   Wt 107.3 kg (236 lb 9.6 oz)   SpO2 96%   BMI 32.09 kg/m    Body mass index is 32.09 kg/m .  Physical Exam   GENERAL: healthy, alert and no distress  EYES: Eyes grossly normal to inspection, PERRL and conjunctivae and sclerae normal  HENT: ear canals and TM's normal, nose and mouth without ulcers or lesions  NECK: no adenopathy, no asymmetry, masses, or scars and thyroid normal to palpation  RESP: lungs clear to auscultation - no rales, rhonchi or wheezes  CV: regular rate and rhythm, normal S1 S2, no S3 or S4, no murmur, click or rub, no peripheral edema and peripheral pulses strong  ABDOMEN: soft, nontender, no hepatosplenomegaly, no masses and bowel sounds normal  MS: no gross musculoskeletal defects noted, no edema  SKIN: no suspicious lesions or rashes  NEURO: Normal strength and tone, mentation intact and speech normal  PSYCH: mentation appears normal, affect normal/bright  Date:  11/20/2019 1:56 PM      Clinical Information: Left rib fractures. Left-sided chest pain.   Comparison: 11/5/2019 and radiograph's.                                                                      Impression:  1.  Mildly displaced fractures of the left posterolateral fourth,  fifth, sixth, seventh, and eighth ribs. The fourth and fifth rib  fractures are either new or better appreciated since the comparison  examination (due to technique and interval displacement). Healed  fracture of the left posterolateral sixth rib.  2.  Moderate sized  left pleural effusion, increased. No pneumothorax.  Atelectasis in the left lung base.  3.  Ill-defined airspace opacity in the lateral aspect of the right  mid lung, new since the comparison. This could represent a focus of  infection or inflammation in the correct clinical setting. Continued  radiographic follow-up is recommended to ensure resolution (6 weeks).     CYNTHIA MORTENSEN MD        Assessment & Plan     (S22.42XG) Closed fracture of multiple ribs of left side with delayed healing, subsequent encounter  (primary encounter diagnosis)  Comment: Patient continues to have mild pain from the rib fracture.  X-ray shows increased fracture increased pleural effusion no pneumothorax and right lower lobe pneumonia.  Reviewed with the ED we will treat patient with Levaquin and will obtain CT scan if not improving.  Followed up with patient patient is still vitally stable has not had any increase in pain patient has had Levaquin in for 4 days will obtain CT this weekend.  CT scan scheduled for 11 AM on Sunday.  Plan: oxyCODONE (ROXICODONE) 5 MG tablet, ibuprofen         (ADVIL/MOTRIN) 600 MG tablet, acetaminophen         (TYLENOL) 325 MG tablet, CT Chest w Contrast      (J18.1) Pneumonia of left lower lobe due to infectious organism (H)  Comment: *Creatinine levels within normal limits we will treat with a course of Levaquin will have follow-up CT scan  Plan: levofloxacin (LEVAQUIN) 750 MG tablet, CT Chest        w Contrast         (J90) Pleural effusion  Comment: We will have follow-up CT scan.  Reviewed Ct Scan with pulmonology on Sunday recommend due to size of Effusion should move forward with a thoracenteses Monday or Tuesday this Week.  Thoracenteses schedule for 11/25/2019 at 11am.   Plan: CT Chest w Contrast            (Z23) Need for prophylactic vaccination and inoculation against influenza  Comment:   Plan: INFLUENZA QUAD, RECOMBINANT, P-FREE (RIV4)         (FLUBLOCK) [96947], Vaccine Administration,          Initial [52910], XR Ribs & Chest Left G/E 3         Views           See Patient Instructions    Return in about 2 days (around 11/22/2019) for Follow-up in CT scan in 4 days.       I spent 45 minute with the patient of which was 50% of the time was face to face counseling educations and coordinating care of the patient.    RITIKA Mendoza [facility]

## 2019-11-20 NOTE — NURSING NOTE
Chief Complaint   Patient presents with     ER F/U       Initial /82 (BP Location: Right arm, Patient Position: Sitting, Cuff Size: Adult Large)   Pulse 91   Temp 97.9  F (36.6  C) (Tympanic)   Resp 18   Ht 1.829 m (6')   Wt 107.3 kg (236 lb 9.6 oz)   SpO2 96%   BMI 32.09 kg/m   Estimated body mass index is 32.09 kg/m  as calculated from the following:    Height as of this encounter: 1.829 m (6').    Weight as of this encounter: 107.3 kg (236 lb 9.6 oz).    Patient presents to the clinic using No DME    Health Maintenance that is potentially due pending provider review:  NONE    n/a    Is there anyone who you would like to be able to receive your results? No  If yes have patient fill out MARSHA Mtz CMA

## 2019-11-20 NOTE — PATIENT INSTRUCTIONS
Patient Education     Rib Fracture    You broke one or more ribs. This is called a rib fracture. Rib fractures don't need a cast like other bones. They will heal by themselves in about 4 to 6 weeks. The first 3 to 4 weeks will be the most painful. During this time deep breathing, coughing, or changing position from sitting to lying down, may cause the broken ends to move slightly.  Home care    Rest. You should not be doing any heavy lifting or strenuous exertion until the pain goes away.    It hurts to breathe when you have a broken rib. This puts you at risk of getting pneumonia from poor airflow through your lungs. To prevent this:  ? Take several very deep breaths once an hour while you're awake. Breathe out through pursed lips as if you are blowing up a balloon. If possible, actually blow up a balloon or a rubber glove. This exercise builds up pressure inside the lung and prevents collapse of the small air sacs of the lung. This exercise may cause some pain at the site of injury. This is normal.  ? You may have gotten a breathing exercise device called an incentive spirometer. Use it at least 4 times a day, or as directed.    Apply an ice pack over the injured area for 15 to 20 minutes every 1 to 2 hours. You should do this for the first 24 to 48 hours. To make an ice pack, put ice cubes in a plastic bag that seals at the top. Wrap the bag in a clean, thin towel or cloth. Never put ice or an ice pack directly on the skin. Keep using ice packs as needed for the relief of pain and swelling.    You may use over-the-counter pain medicine to control pain, unless another pain medicine was prescribed. If you have chronic liver or kidney disease or ever had a stomach ulcer or GI (gastrointestinal) bleeding, talk with your healthcare provider before using these medicines.    If your pain is not controlled, contact your healthcare provider. Sometimes a stronger pain medicine may be needed. A nerve block can be done in  case of severe pain. It will numb the nerve between the ribs.  Follow-up care  Follow up with your healthcare provider, or as advised. In rare cases, a broken rib will cause complications in the first few days that may not be clearly seen during your initial exam. This can include collapsed lung, bleeding around the lung or into the belly (abdomen), or pneumonia. So watch for the signs below.  If X-rays were taken, you will be told of any new findings that may affect your care.  Call 911  Call 911 if you have:    Dizziness, weakness or fainting    Shortness of breath with or without chest discomfort    New or worsening abdominal pain    Discomfort in other areas of your upper body such as your shoulders, jaw, neck, or arms  When to seek medical advice  Call your healthcare provider right away if any of these occur:    Increasing chest pain with breathing    Fever of 100.4 F (38 C) or above, or as directed by your healthcare provider    Congested cough, nausea, or vomiting  Date Last Reviewed: 4/1/2018 2000-2018 [employer] acquired by [employer]. [address] [place] [address], [place] [address]. All rights reserved. This information is not intended as a substitute for professional medical care. Always follow your healthcare professional's instructions.

## 2019-11-24 ENCOUNTER — DOCUMENTATION ONLY (OUTPATIENT)
Dept: PULMONOLOGY | Facility: CLINIC | Age: 54
End: 2019-11-24

## 2019-11-24 ENCOUNTER — HOSPITAL ENCOUNTER (OUTPATIENT)
Dept: CT IMAGING | Facility: CLINIC | Age: 54
Discharge: HOME | End: 2019-11-24
Attending: NURSE PRACTITIONER | Admitting: NURSE PRACTITIONER
Payer: COMMERCIAL

## 2019-11-24 DIAGNOSIS — S22.49XA MULTIPLE RIB FRACTURES: ICD-10-CM

## 2019-11-24 PROCEDURE — 71260 CT THORAX DX C+: CPT

## 2019-11-24 PROCEDURE — 25000125 ZZHC RX 250: Performed by: NURSE PRACTITIONER

## 2019-11-24 PROCEDURE — 25000128 H RX IP 250 OP 636: Performed by: NURSE PRACTITIONER

## 2019-11-24 RX ORDER — IOPAMIDOL 755 MG/ML
80 INJECTION, SOLUTION INTRAVASCULAR ONCE
Status: COMPLETED | OUTPATIENT
Start: 2019-11-24 | End: 2019-11-24

## 2019-11-24 RX ADMIN — SODIUM CHLORIDE 75 ML: 9 INJECTION, SOLUTION INTRAVENOUS at 10:56

## 2019-11-24 RX ADMIN — IOPAMIDOL 80 ML: 755 INJECTION, SOLUTION INTRAVENOUS at 10:56

## 2019-11-24 NOTE — PROGRESS NOTES
I was called by Juliettedennis Villalobos to discuss management of pleural effusion after traumatic chest injury.  The reported the injury occurred a few weeks ago.  The patient has been treated with Levofloxacin for an infiltrate on imaging with the last dose taken today.  The CT chest performed today shows a moderate sized pleural effusion on the side of the trauma and rib fractures.  The patient is reported clinically stable and not short of breath.    We discussed that it would be appropriate to pursue a thoracentesis by IR in the next one to two days.  We discussed that the effusion may be a hemothorax or less likely an empyema (given clinical stability).  The risk of letting it resolve without intervention include ongoing atelectasis of the left lower lobe or the potential for trapped lung.  Ms. Villalobos plans to discuss the case with IR in the hopes of scheduling the procedure in the next day.  She was going to instruct the patient to go to the ED if there is any change in symptoms.    Lavern Condon MD MPH  Associate Professor of Medicine  Pager [phone]

## 2019-11-25 ENCOUNTER — ANCILLARY PROCEDURE (OUTPATIENT)
Dept: RADIOLOGY | Facility: AMBULATORY SURGERY CENTER | Age: 54
End: 2019-11-25
Attending: NURSE PRACTITIONER
Payer: COMMERCIAL

## 2019-11-25 ENCOUNTER — HOSPITAL ENCOUNTER (EMERGENCY)
Facility: CLINIC | Age: 54
Discharge: STILL ADMITTED OR ADMINISTRATIVE | End: 2019-11-25
Payer: COMMERCIAL

## 2019-11-25 ENCOUNTER — HOSPITAL ENCOUNTER (OUTPATIENT)
Facility: AMBULATORY SURGERY CENTER | Age: 54
End: 2019-11-25
Attending: PHYSICIAN ASSISTANT
Payer: COMMERCIAL

## 2019-11-25 VITALS
BODY MASS INDEX: 32.51 KG/M2 | WEIGHT: 240 LBS | RESPIRATION RATE: 16 BRPM | HEART RATE: 87 BPM | HEIGHT: 72 IN | SYSTOLIC BLOOD PRESSURE: 148 MMHG | OXYGEN SATURATION: 95 % | DIASTOLIC BLOOD PRESSURE: 92 MMHG | TEMPERATURE: 98 F

## 2019-11-25 DIAGNOSIS — J90 PLEURAL EFFUSION: ICD-10-CM

## 2019-11-25 LAB
APPEARANCE FLD: NORMAL
BASOPHILS NFR FLD MANUAL: 5 %
COLOR FLD: NORMAL
EOSINOPHIL NFR FLD MANUAL: 37 %
GRAM STN SPEC: NORMAL
INR PPP: 1.04 (ref 0.86–1.14)
INR PPP: NORMAL (ref 0.86–1.14)
LYMPHOCYTES NFR FLD MANUAL: 40 %
NEUTS BAND NFR FLD MANUAL: 7 %
OTHER CELLS FLD MANUAL: 11 %
SPECIMEN SOURCE FLD: NORMAL
SPECIMEN SOURCE: NORMAL
WBC # FLD AUTO: 4875 /UL

## 2019-11-25 ASSESSMENT — MIFFLIN-ST. JEOR: SCORE: 1961.63

## 2019-11-25 NOTE — OR NURSING
Left Thoracentesis performed by JOSIAH Will. Total output: 1260 ml, Dark Red in color. Patient tolerated removal of fluid well.

## 2019-11-25 NOTE — DISCHARGE INSTRUCTIONS
Discharge Instructions for Paracentesis or Thoracentesis     Paracentesis is a procedure to remove extra fluid from your belly (abdomen). Thoracentesis is a procedure to remove extra fluid from your chest.  This fluid buildup is called ascites. The procedure may have been done to take a sample of the fluid. Or, it may have been done to drain the extra fluid from your abdomen or chest to help make you more comfortable.     Home care    If you have pain after the procedure, your healthcare provider can prescribe or recommend pain medicines. Take these exactly as directed. If you stopped taking other medicines before the procedure, ask your provider when you can start them again.    Avoid strenuous activity for 48 hours.    You will have a small bandage over the puncture site. Adhesive tapes, adhesive strips, or surgical glue may also be used to close the incision. They also help stop bleeding and promote healing. You may take the bandage off in 24-48 hours. Once there is a scab over the site, no bandages are required.    Check the puncture site for the signs of infection listed below.     Follow-up care  Make a follow-up appointment with your healthcare provider as directed. During your follow-up visit, your provider will check your healing. Let your provider know how you are feeling. You can also discuss the cause of your fluid, and if you need any further treatment.    When to seek medical advice:  Call your healthcare provider if you have any of the following after the procedure:    A fever of 100.4 F (38.0 C) or higher    Trouble breathing    Abdominal pain that is worse than expected    Belly Abdominal pain not caused by having the skin punctured that is worse than expected    Persistent bleeding from the puncture site    More than a small amount of fluid leaking from the puncture site    Swollen belly    Signs of infection at the puncture site. These include increased pain, redness, or swelling, warmth, or  bad-smelling drainage.    Feeling dizzy or lightheaded, or fainting     Call our Interventional Radiology (IR) service if:  If you start bleeding from the procedure site.  If you do start to bleed from the site, lie down and hold some pressure on the site.  Our radiology provider can help you decide if you need to return to the hospital.  If you have new or worsening pain related to the procedure.  If you have pronounced swelling at the procedure site.  If you develop persistent nausea or vomiting.  If you develop hives or a rash or any unexplained itching.  If you have a fever of greater than 100.4  F and chills in the first 5 days after procedure.  Any other concerns related to your procedure.      [facility]  Interventional Radiology (IR)  [address]  [facility] [place] [address]    Contact Number:  [phone]  (IR control desk)  Monday - Friday 8:00 am - 4:30 pm    After hours for urgent concerns:  [phone]  After 4:30 pm Monday - Friday, Weekends and Holidays.   Ask for Interventional Radiology on-call.  Someone is available 24 hours a day.  [facility] toll free number:  [phone]

## 2019-11-25 NOTE — PROCEDURES
Interventional Radiology Brief Post Procedure Note    Procedure: IR THORACENTESIS    Proceduralist: Fred Will PA-C    Assistant: None    Time Out: Prior to the start of the procedure and with procedural staff participation, I verbally confirmed the patient s identity using two indicators, relevant allergies, that the procedure was appropriate and matched the consent or emergent situation, and that the correct equipment/implants were available. Immediately prior to starting the procedure I conducted the Time Out with the procedural staff and re-confirmed the patient s name, procedure, and site/side. (The Joint Commission universal protocol was followed.)  Yes    Sedation: None. Local Anesthestic used    Findings: Completed ultrasound-guided left diagnostic and therapeutic thoracentesis. A total of 1260 mL dark red fluid drained from the left pleural space. A sample of pleural fluid was sent to lab for analysis as requested. Drainage stopped due to risk of reexpansion pulmonary edema, small-to-moderate left pleural effusion remains. No immediate complication.    Estimated Blood Loss: Minimal    Specimens: Fluid and/or tissue for laboratory analysis and culture    Complications: 1. None     Condition: Stable    Plan: Follow up per primary team. Return to IR if symptoms return, though effusion should resolve spontaneously.    Comments: See dictated procedure note for full details.    Fred Will PA-C  Interventional Radiology  [phone]

## 2019-11-26 RX ORDER — OXYCODONE AND ACETAMINOPHEN 5; 325 MG/1; MG/1
1-2 TABLET ORAL
Qty: 12 TABLET | Refills: 0 | Status: SHIPPED | OUTPATIENT
Start: 2019-11-26 | End: 2020-03-17

## 2019-11-30 LAB
BACTERIA SPEC CULT: NO GROWTH
SPECIMEN SOURCE: NORMAL

## 2019-12-02 ENCOUNTER — TELEPHONE (OUTPATIENT)
Dept: FAMILY MEDICINE | Facility: CLINIC | Age: 54
End: 2019-12-02

## 2019-12-02 NOTE — TELEPHONE ENCOUNTER
Reason for Call:  Other     Detailed comments: Patient canc appt for tomorrow due to a death in the family - He is in the state of [place] and not sure when he will be back.  Wanted you to know why he canc his appt    Phone Number Patient can be reached at:     Best Time:     Can we leave a detailed message on this number?     Call taken on 12/2/2019 at 11:11 AM by Frances Demarco

## 2019-12-05 ENCOUNTER — ANCILLARY PROCEDURE (OUTPATIENT)
Dept: RADIOLOGY | Facility: CLINIC | Age: 54
End: 2019-12-05
Payer: COMMERCIAL

## 2019-12-05 ENCOUNTER — OFFICE VISIT (OUTPATIENT)
Dept: URGENT CARE | Facility: CLINIC | Age: 54
End: 2019-12-05
Payer: COMMERCIAL

## 2019-12-05 ENCOUNTER — TELEPHONE (OUTPATIENT)
Dept: FAMILY MEDICINE | Facility: CLINIC | Age: 54
End: 2019-12-05

## 2019-12-05 VITALS
HEART RATE: 113 BPM | DIASTOLIC BLOOD PRESSURE: 90 MMHG | HEIGHT: 72 IN | OXYGEN SATURATION: 96 % | BODY MASS INDEX: 31.77 KG/M2 | RESPIRATION RATE: 16 BRPM | SYSTOLIC BLOOD PRESSURE: 130 MMHG | TEMPERATURE: 98.9 F | WEIGHT: 234.6 LBS

## 2019-12-05 DIAGNOSIS — S22.42XG CLOSED FRACTURE OF MULTIPLE RIBS OF LEFT SIDE WITH DELAYED HEALING, SUBSEQUENT ENCOUNTER: ICD-10-CM

## 2019-12-05 DIAGNOSIS — S22.42XG CLOSED FRACTURE OF MULTIPLE RIBS OF LEFT SIDE WITH DELAYED HEALING, SUBSEQUENT ENCOUNTER: Primary | ICD-10-CM

## 2019-12-05 PROCEDURE — 71046 X-RAY EXAM CHEST 2 VIEWS: CPT | Mod: TC | Performed by: PHYSICIAN ASSISTANT

## 2019-12-05 PROCEDURE — 99202 OFFICE O/P NEW SF 15 MIN: CPT | Performed by: PHYSICIAN ASSISTANT

## 2019-12-05 RX ORDER — DILTIAZEM HYDROCHLORIDE 120 MG/1
360 CAPSULE, COATED, EXTENDED RELEASE ORAL DAILY
COMMUNITY

## 2019-12-05 RX ORDER — LORATADINE 10 MG/1
10 TABLET ORAL DAILY
COMMUNITY

## 2019-12-05 RX ORDER — FLUTICASONE PROPIONATE 50 MCG
1 SPRAY, SUSPENSION (ML) NASAL DAILY
COMMUNITY

## 2019-12-05 RX ORDER — OXYCODONE AND ACETAMINOPHEN 5; 325 MG/1; MG/1
1-2 TABLET ORAL
Qty: 12 TABLET | Refills: 0 | Status: CANCELLED | OUTPATIENT
Start: 2019-12-05

## 2019-12-05 RX ORDER — OXYCODONE HYDROCHLORIDE 5 MG/1
5 TABLET ORAL EVERY 4 HOURS PRN
COMMUNITY

## 2019-12-05 RX ORDER — OXYCODONE HYDROCHLORIDE 5 MG/1
5-10 TABLET ORAL
Qty: 30 TABLET | Refills: 0 | Status: SHIPPED | OUTPATIENT
Start: 2019-12-05 | End: 2020-03-17

## 2019-12-05 RX ORDER — IBUPROFEN 200 MG
600 TABLET ORAL EVERY 6 HOURS PRN
COMMUNITY

## 2019-12-05 ASSESSMENT — ENCOUNTER SYMPTOMS
FEVER: 0
SLEEP DISTURBANCE: 1
SHORTNESS OF BREATH: 0
ARTHRALGIAS: 1
DIZZINESS: 0
FATIGUE: 1
CHILLS: 0
WOUND: 0
PALPITATIONS: 0
COUGH: 1
SEIZURES: 0

## 2019-12-05 ASSESSMENT — PAIN SCALES - GENERAL: PAINLEVEL: 4

## 2019-12-05 NOTE — PROGRESS NOTES
Chief Complaint   Patient presents with   • Follow-up       Broderick Clinton is an 53 y.o. male    HPI  Patient is visiting [facility] for a  but needs follow-up for multiple rib fractures.  He is also needing a refill on his narcotics and was told by his primary care provider, Dr. Tamia Galvan in [place], that if he was evaluated in an urgent care and got a follow-up x-ray that she would refill his pain med.  Chart review shows that patient fell in his backyard on November 3 striking his left chest on a retaining wall.  He went to the ER that day where x-ray showed fractures of the 7th and 8th left ribs. He was given oxycodone. On  he returned to the ER for worsening pain and x-ray showed fractures of the 6th through 8th ribs as well as some atelectasis. He was given an incentive spirometer. He followed up with family medicine on  where repeat x-ray showed fractures of the 4th through 8th left rib as well as a left pleural effusion and some infiltrates.  He was started on Levaquin and sent for CT scan.  CT scan done 2019 showed fractures of the 4th through 10th left ribs as well as the pleural effusion.  On the  he underwent thoracentesis and had 1260 mL's of bloody fluid removed.  He was supposed to follow-up with his primary care provider on  but had already left [facility] to attend a .  He states that his pain is significantly improved.  On average it is a 4/10 in severity but with rest it decreases to 0. He still has some difficulty sleeping 2/2 the pain and takes oxycodone 1-2 times per night for this. During the day he takes only motrin or tylenol. He does report some dyspnea on exertion, mild cough, and a nonradiating central chest discomfort with inspiration that all began when he arrived in [place] on Monday.  He states that there is approximately 1500 feet difference in elevation between his hometown and here.  He also has environmental allergies. He denies any  fever, shortness of breath at rest, hemoptysis, dizziness, fainting.  He has no chronic lung conditions and does not smoke.  Wife states they should be returning to [place] mid next week but he will run out of his narcotic prior to then.       Current Outpatient Medications:   •  diltiazem CD (CARDIZEM CD) 120 mg 24 hr capsule, Take 360 mg by mouth daily, Disp: , Rfl:   •  fluticasone propionate (FLONASE) 50 mcg/actuation nasal spray, Administer 1 spray into each nostril daily, Disp: , Rfl:   •  ibuprofen (ADVIL,MOTRIN) 200 mg tablet, Take 600 mg by mouth every 6 (six) hours as needed for pain scale 1-3/10, Disp: , Rfl:   •  loratadine (CLARITIN) 10 mg tablet, Take 10 mg by mouth daily, Disp: , Rfl:   •  loratadine-pseudoephedrine (CLARITIN-D 24-hour)  mg per 24 hr tablet, Take 1 tablet by mouth daily, Disp: , Rfl:   •  oxyCODONE (ROXICODONE) 5 mg immediate release tablet, Take 5 mg by mouth every 4 (four) hours as needed for pain scale 8-10/10, Disp: , Rfl:     No Known Allergies    History reviewed. No pertinent past medical history.     History reviewed. No pertinent surgical history.    History reviewed. No pertinent family history.    Social History     Socioeconomic History   • Marital status:      Spouse name: Not on file   • Number of children: Not on file   • Years of education: Not on file   • Highest education level: Not on file   Occupational History   • Not on file   Social Needs   • Financial resource strain: Not on file   • Food insecurity     Worry: Not on file     Inability: Not on file   • Transportation needs     Medical: Not on file     Non-medical: Not on file   Tobacco Use   • Smoking status: Never Smoker   • Smokeless tobacco: Never Used   Substance and Sexual Activity   • Alcohol use: Not on file   • Drug use: Not on file   • Sexual activity: Not on file   Lifestyle   • Physical activity     Days per week: Not on file     Minutes per session: Not on file   • Stress: Not on file    Relationships   • Social connections     Talks on phone: Not on file     Gets together: Not on file     Attends Shinto service: Not on file     Active member of club or organization: Not on file     Attends meetings of clubs or organizations: Not on file     Relationship status: Not on file   • Intimate partner violence     Fear of current or ex partner: Not on file     Emotionally abused: Not on file     Physically abused: Not on file     Forced sexual activity: Not on file   Other Topics Concern   • Not on file   Social History Narrative   • Not on file         There is no immunization history on file for this patient.    Review of Systems   Constitutional: Positive for fatigue. Negative for chills and fever.        2/2 difficulty sleeping   Respiratory: Positive for cough. Negative for shortness of breath.    Cardiovascular: Negative for palpitations.   Musculoskeletal: Positive for arthralgias.        Left lateral rib pain   Skin: Negative for wound.   Neurological: Negative for dizziness, seizures and syncope.   Psychiatric/Behavioral: Positive for sleep disturbance.       /90 (BP Location: Left arm, Patient Position: Sitting, Cuff Size: Large)   Pulse 113   Temp 37.2 °C (98.9 °F) (Temporal)   Resp 16   Ht 1.829 m (6')   Wt 106 kg (234 lb 9.6 oz)   SpO2 96%   BMI 31.82 kg/m²     Physical Exam  Vitals signs and nursing note reviewed.   Constitutional:       General: He is not in acute distress.     Appearance: Normal appearance. He is not ill-appearing, toxic-appearing or diaphoretic.   HENT:      Head: Normocephalic and atraumatic.   Eyes:      Conjunctiva/sclera: Conjunctivae normal.   Cardiovascular:      Rate and Rhythm: Regular rhythm. Tachycardia present.      Heart sounds: Normal heart sounds. No murmur. No friction rub. No gallop.       Comments: Pt mildly tachycardic at time of check in by nurse. Pulse had decreased to 98 BPM several minutes later  Pulmonary:      Effort: Pulmonary  effort is normal. No respiratory distress.      Breath sounds: No stridor. Rales present. No wheezing or rhonchi.      Comments: Breath sounds decreased in left middle and lower lobes.  Very faint crackles were also appreciated in these areas.  Right lung sounds clear.  Patient reports diffuse tenderness over left ribs along mid axillary line  Chest:      Chest wall: Tenderness present.   Musculoskeletal:         General: No swelling or deformity.   Skin:     Findings: No bruising or erythema.   Neurological:      General: No focal deficit present.      Mental Status: He is alert and oriented to person, place, and time.      Gait: Gait normal.   Psychiatric:         Mood and Affect: Mood normal.         Behavior: Behavior normal.         Assessment/Plan     1. Closed fracture of multiple ribs of left side with delayed healing, subsequent encounter  Since I could see his previous encounters in the EMR I was under the impression that the radiologist would have some comparison x-rays to review today. I was mistaken.  Advised radiologist found mild to moderate pleural effusion in left lung but that I am unable to determine if this is increased, decreased, or stable.  His oxygen saturation is within normal limits as is his respiratory rate, and his pulse returned to normal limits after few moments of rest.  Advised that his dyspnea on exertion and mild cough could be secondary to the altitude change and environmental allergies, but that I cannot definitively exclude the pleural effusion as the cause.  Advised that with him being over a month post injury I do not feel comfortable prescribing continued narcotics and I instructed him to follow-up with his primary care provider for this.  He was given strict ER precautions if he were to develop any new or worse symptoms.    - X-ray chest 2 views; Future     Patient verbalized understanding and agreement and left in stable condition.    I instructed my nurse to contact   Mandy's office to obtain a fax number and fax both this note and the x-ray report to his PCP    Patient Instructions   Follow-up with your primary care provider want to return to [place].  Report to the ER immediately for worsening breathing, worsening chest pain, coughing up blood, fainting, fever, or any other concerning symptoms      Monica Starkey PA-C

## 2019-12-05 NOTE — TELEPHONE ENCOUNTER
Kate, Spouse says Roni had to cancel his apt on Tuesday with Juliette due to a death in the family. They are in the state of [place] and will not come home until next Tuesday or Wednesday. She says he needs refill of his Oxycodone and isn't sure if Juliette is able to send it to a pharmacy in [place] or if he is going to have to be seen out there. He has 2 left and takes one in the AM and one in the PM.     Spouse is Kate: Phone [phone]    Pharmacy is: [facility] in [facility].     (Pharmacy is chosen in Epic)

## 2019-12-05 NOTE — TELEPHONE ENCOUNTER
Kate notified per Juliette that she will not be able to prescribe Oxycodone out of state. He will need to be seen and will need CxR to follow up one week past the Thoracentesis he had done. The person he sees will be able to prescribe pain meds and if they need to talk to Juliette, she will be in the office today and tomorrow.

## 2019-12-05 NOTE — TELEPHONE ENCOUNTER
Reason for call:  Patient reporting a symptom    Symptom or request: Pt called this am to request additional Oxycodone. Per jeremiah Villalobos NP.  Pt needed to have a x-ray to see how the fluid was doing on the lungs.Pt went to .  They could do the -xray but refused to fill the Oxycodone.  Pt went [facility]. Pt did not have a Tele number.  Please Advise.  Call Kate Tamayo at [phone]    Call taken on 12/5/2019 at 2:58 PM by Katy York

## 2019-12-06 NOTE — TELEPHONE ENCOUNTER
Kate called in to inform Juliette that Roni went to :      [address]  [address]    Heather CRENSHAW    [phone]    If you need to call back call Kate at [phone]    [facility] [place]

## 2019-12-10 NOTE — TELEPHONE ENCOUNTER
Reviewed x-ray does show small to moderate pleural effusion.  Did review left message with wife patient will need to have a follow-up appointment when re turns for repeat evaluation.    If he has any change in his symptoms should be seen in urgent care or emergency room.    Juliette Villalobos CNP

## 2019-12-20 ENCOUNTER — TELEPHONE (OUTPATIENT)
Dept: FAMILY MEDICINE | Facility: CLINIC | Age: 54
End: 2019-12-20

## 2019-12-20 ENCOUNTER — HOSPITAL ENCOUNTER (EMERGENCY)
Facility: CLINIC | Age: 54
Discharge: HOME | End: 2019-12-20
Attending: FAMILY MEDICINE | Admitting: FAMILY MEDICINE
Payer: COMMERCIAL

## 2019-12-20 ENCOUNTER — APPOINTMENT (OUTPATIENT)
Dept: CT IMAGING | Facility: CLINIC | Age: 54
End: 2019-12-20
Attending: FAMILY MEDICINE
Payer: COMMERCIAL

## 2019-12-20 ENCOUNTER — APPOINTMENT (OUTPATIENT)
Dept: GENERAL RADIOLOGY | Facility: CLINIC | Age: 54
End: 2019-12-20
Attending: FAMILY MEDICINE
Payer: COMMERCIAL

## 2019-12-20 VITALS
WEIGHT: 235 LBS | OXYGEN SATURATION: 97 % | BODY MASS INDEX: 31.87 KG/M2 | DIASTOLIC BLOOD PRESSURE: 120 MMHG | TEMPERATURE: 98.9 F | SYSTOLIC BLOOD PRESSURE: 168 MMHG | RESPIRATION RATE: 20 BRPM | HEART RATE: 92 BPM

## 2019-12-20 DIAGNOSIS — R79.89 POSITIVE D DIMER: ICD-10-CM

## 2019-12-20 DIAGNOSIS — R05.9 COUGH: ICD-10-CM

## 2019-12-20 DIAGNOSIS — Z87.81 HISTORY OF FRACTURE OF RIB: ICD-10-CM

## 2019-12-20 DIAGNOSIS — J90 PLEURAL EFFUSION: ICD-10-CM

## 2019-12-20 DIAGNOSIS — R06.09 DYSPNEA ON EXERTION: ICD-10-CM

## 2019-12-20 LAB
ALBUMIN SERPL-MCNC: 3.8 G/DL (ref 3.4–5)
ALP SERPL-CCNC: 141 U/L (ref 40–150)
ALT SERPL W P-5'-P-CCNC: 24 U/L (ref 0–70)
ANION GAP SERPL CALCULATED.3IONS-SCNC: 6 MMOL/L (ref 3–14)
AST SERPL W P-5'-P-CCNC: 17 U/L (ref 0–45)
BASOPHILS # BLD AUTO: 0.1 10E9/L (ref 0–0.2)
BASOPHILS NFR BLD AUTO: 1.6 %
BILIRUB SERPL-MCNC: 0.5 MG/DL (ref 0.2–1.3)
BUN SERPL-MCNC: 11 MG/DL (ref 7–30)
CALCIUM SERPL-MCNC: 9.7 MG/DL (ref 8.5–10.1)
CHLORIDE SERPL-SCNC: 104 MMOL/L (ref 94–109)
CO2 SERPL-SCNC: 27 MMOL/L (ref 20–32)
CREAT SERPL-MCNC: 0.83 MG/DL (ref 0.66–1.25)
D DIMER PPP FEU-MCNC: 5.4 UG/ML FEU (ref 0–0.5)
DIFFERENTIAL METHOD BLD: ABNORMAL
EOSINOPHIL # BLD AUTO: 0.8 10E9/L (ref 0–0.7)
EOSINOPHIL NFR BLD AUTO: 10.9 %
ERYTHROCYTE [DISTWIDTH] IN BLOOD BY AUTOMATED COUNT: 11.8 % (ref 10–15)
GFR SERPL CREATININE-BSD FRML MDRD: >90 ML/MIN/{1.73_M2}
GLUCOSE SERPL-MCNC: 97 MG/DL (ref 70–99)
HCT VFR BLD AUTO: 46 % (ref 40–53)
HGB BLD-MCNC: 15.2 G/DL (ref 13.3–17.7)
IMM GRANULOCYTES # BLD: 0 10E9/L (ref 0–0.4)
IMM GRANULOCYTES NFR BLD: 0.3 %
LYMPHOCYTES # BLD AUTO: 1.3 10E9/L (ref 0.8–5.3)
LYMPHOCYTES NFR BLD AUTO: 16.3 %
MCH RBC QN AUTO: 32.2 PG (ref 26.5–33)
MCHC RBC AUTO-ENTMCNC: 33 G/DL (ref 31.5–36.5)
MCV RBC AUTO: 98 FL (ref 78–100)
MONOCYTES # BLD AUTO: 0.7 10E9/L (ref 0–1.3)
MONOCYTES NFR BLD AUTO: 8.9 %
NEUTROPHILS # BLD AUTO: 4.8 10E9/L (ref 1.6–8.3)
NEUTROPHILS NFR BLD AUTO: 62 %
NRBC # BLD AUTO: 0 10*3/UL
NRBC BLD AUTO-RTO: 0 /100
NT-PROBNP SERPL-MCNC: 22 PG/ML (ref 0–900)
PLATELET # BLD AUTO: 350 10E9/L (ref 150–450)
POTASSIUM SERPL-SCNC: 3.7 MMOL/L (ref 3.4–5.3)
PROT SERPL-MCNC: 8.4 G/DL (ref 6.8–8.8)
RBC # BLD AUTO: 4.72 10E12/L (ref 4.4–5.9)
SODIUM SERPL-SCNC: 137 MMOL/L (ref 133–144)
TROPONIN I SERPL-MCNC: <0.015 UG/L (ref 0–0.04)
WBC # BLD AUTO: 7.7 10E9/L (ref 4–11)

## 2019-12-20 PROCEDURE — 71046 X-RAY EXAM CHEST 2 VIEWS: CPT

## 2019-12-20 PROCEDURE — 25000125 ZZHC RX 250: Performed by: FAMILY MEDICINE

## 2019-12-20 PROCEDURE — 99285 EMERGENCY DEPT VISIT HI MDM: CPT | Mod: 25 | Performed by: FAMILY MEDICINE

## 2019-12-20 PROCEDURE — 71275 CT ANGIOGRAPHY CHEST: CPT

## 2019-12-20 PROCEDURE — 84484 ASSAY OF TROPONIN QUANT: CPT | Performed by: FAMILY MEDICINE

## 2019-12-20 PROCEDURE — 93010 ELECTROCARDIOGRAM REPORT: CPT | Mod: Z6 | Performed by: FAMILY MEDICINE

## 2019-12-20 PROCEDURE — 25000128 H RX IP 250 OP 636: Performed by: FAMILY MEDICINE

## 2019-12-20 PROCEDURE — 84443 ASSAY THYROID STIM HORMONE: CPT | Performed by: FAMILY MEDICINE

## 2019-12-20 PROCEDURE — 85025 COMPLETE CBC W/AUTO DIFF WBC: CPT | Performed by: FAMILY MEDICINE

## 2019-12-20 PROCEDURE — 93005 ELECTROCARDIOGRAM TRACING: CPT | Performed by: FAMILY MEDICINE

## 2019-12-20 PROCEDURE — 85379 FIBRIN DEGRADATION QUANT: CPT | Performed by: FAMILY MEDICINE

## 2019-12-20 PROCEDURE — 83880 ASSAY OF NATRIURETIC PEPTIDE: CPT | Performed by: FAMILY MEDICINE

## 2019-12-20 PROCEDURE — 80053 COMPREHEN METABOLIC PANEL: CPT | Performed by: FAMILY MEDICINE

## 2019-12-20 RX ORDER — IOPAMIDOL 755 MG/ML
91 INJECTION, SOLUTION INTRAVASCULAR ONCE
Status: COMPLETED | OUTPATIENT
Start: 2019-12-20 | End: 2019-12-20

## 2019-12-20 RX ORDER — FEXOFENADINE HCL 60 MG/1
60 TABLET, FILM COATED ORAL DAILY
COMMUNITY
End: 2021-04-13

## 2019-12-20 RX ORDER — BENZONATATE 200 MG/1
200 CAPSULE ORAL 3 TIMES DAILY PRN
Qty: 21 CAPSULE | Refills: 0 | Status: SHIPPED | OUTPATIENT
Start: 2019-12-20 | End: 2020-03-17

## 2019-12-20 RX ADMIN — IOPAMIDOL 91 ML: 755 INJECTION, SOLUTION INTRAVENOUS at 17:05

## 2019-12-20 RX ADMIN — SODIUM CHLORIDE 100 ML: 9 INJECTION, SOLUTION INTRAVENOUS at 17:05

## 2019-12-20 ASSESSMENT — ENCOUNTER SYMPTOMS
SORE THROAT: 0
CONSTIPATION: 0
DIARRHEA: 0
ABDOMINAL PAIN: 0
FEVER: 0
SHORTNESS OF BREATH: 1
FREQUENCY: 0
DYSURIA: 0
DIAPHORESIS: 0
PALPITATIONS: 0
COUGH: 1
VOMITING: 0
BLOOD IN STOOL: 0
HEADACHES: 1
WHEEZING: 0
CHILLS: 0
SINUS PRESSURE: 0
NAUSEA: 1

## 2019-12-20 NOTE — ED PROVIDER NOTES
History     Chief Complaint   Patient presents with     Shortness of Breath     coughing, multiple rib fractures, has had to have fluid drained     HPI  Roni Martin is a 53 year old male who presented with a complicated recent history of multiple left-sided rib fractures that occurred when he was moving a tray of smoked fish from a smoker and tripped falling onto his retaining wall striking the lateral chest.  He was seen here in early November several times for persistent chest pain shortness of breath affecting the lateral aspect of the left chest and had reassuring chest x-ray.  He was then seen in clinic on November 20 and had a CT outpatient ordered with findings on CT 11/24  demonstrating multiple contiguous rib fractures and a pleural effusion that was moderate size.  He on November 25 had aspiration of fluid approximately 1200 cc of hemorrhagic fluid.  The patient improved gradually and then had some persistent pain that he was seen for while traveling in [place] on 12/5/2019 and had an x-ray demonstrating a persistent moderate pleural effusion.  Continued to improve without significant dyspnea and pain also improved but then starting 4 days ago after the long distance 12-hour drive return from [place] developed a sense of dyspnea on exertion that is been persistent for the last several days.  This is associated with a persistent cough.  Tactile warmth without known  fever.  Cough is been nonproductive.  Occurs often in the evenings.  Some sense of orthopnea.  No lower extremity edema.  No other risk factors for VTE.  No known heart disease.  Denies tobacco abuse, asthma or COPD.      Allergies:  No Known Allergies    Problem List:    Patient Active Problem List    Diagnosis Date Noted     Dyslipidemia 06/22/2015     Priority: Medium      in March 2019.       Obesity 05/18/2015     Priority: Medium     Benign essential hypertension 05/18/2015     Priority: Medium     Trying lifestyle.          Elevated fasting blood sugar 05/18/2015     Priority: Medium        Past Medical History:    No past medical history on file.    Past Surgical History:    Past Surgical History:   Procedure Laterality Date     IR THORACENTESIS  11/25/2019     THORACENTESIS Left 11/25/2019    Procedure: THORACENTESIS;  Surgeon: Gal Will PA-C;  Location:  OR       Family History:    Family History   Problem Relation Age of Onset     Parkinsonism Mother      Diabetes Father      Hypertension Father      Hyperlipidemia No family hx of      Heart Disease No family hx of        Social History:  Marital Status:   [2]  Social History     Tobacco Use     Smoking status: Never Smoker     Smokeless tobacco: Never Used   Substance Use Topics     Alcohol use: Yes     Drug use: No        Medications:    diltiazem ER COATED BEADS (CARDIZEM CD) 360 MG 24 hr capsule  fexofenadine (ALLEGRA) 60 MG tablet  fluticasone (FLONASE) 50 MCG/ACT nasal spray  oxyCODONE (ROXICODONE) 5 MG tablet  oxyCODONE-acetaminophen (PERCOCET) 5-325 MG tablet  acetaminophen (TYLENOL) 325 MG tablet  ibuprofen (ADVIL/MOTRIN) 600 MG tablet  ORDER FOR DME, SET TO LOCAL PRINT,          Review of Systems   Constitutional: Negative for chills, diaphoresis and fever.   HENT: Negative for ear pain, sinus pressure and sore throat.    Eyes: Negative for visual disturbance.   Respiratory: Positive for cough and shortness of breath. Negative for wheezing.    Cardiovascular: Positive for chest pain. Negative for palpitations.   Gastrointestinal: Positive for nausea. Negative for abdominal pain, blood in stool, constipation, diarrhea and vomiting.   Genitourinary: Negative for dysuria, frequency and urgency.   Skin: Negative for rash.   Neurological: Positive for headaches (mild).   All other systems reviewed and are negative.      Physical Exam   BP: (!) 166/104  Heart Rate: 84  Temp: 98.9  F (37.2  C)  Resp: 20  Weight: 106.6 kg (235 lb)  SpO2: 96 %      Physical  Exam  Constitutional:       General: He is in acute distress.      Appearance: He is not toxic-appearing or diaphoretic.   HENT:      Head: Atraumatic.      Nose: Nose normal.      Mouth/Throat:      Mouth: Mucous membranes are moist.   Eyes:      Conjunctiva/sclera: Conjunctivae normal.   Neck:      Musculoskeletal: Neck supple.   Cardiovascular:      Rate and Rhythm: Normal rate and regular rhythm.      Heart sounds: Normal heart sounds. No murmur. No friction rub. No gallop.    Pulmonary:      Effort: Pulmonary effort is normal.      Breath sounds: Examination of the left-upper field reveals decreased breath sounds. Examination of the left-middle field reveals decreased breath sounds. Decreased breath sounds present. No wheezing or rhonchi.   Abdominal:      General: Abdomen is flat. Bowel sounds are normal. There is no distension.      Palpations: Abdomen is soft. There is no mass.      Tenderness: There is no abdominal tenderness.   Musculoskeletal:      Right lower leg: No edema.      Left lower leg: No edema.   Skin:     Findings: No rash.   Neurological:      General: No focal deficit present.      Mental Status: He is alert.         ED Course        Procedures                 EKG Interpretation:      Interpreted by Oziel Prince MD  EKG done at 1448 hrs. demonstrates a sinus rhythm at 74 bpm with a normal axis and no ST change.  No T wave changes.  Normal R progression and no Q waves.  Normal intervals.  Normal conduction.  No ectopy.  Impression sinus rhythm 74 bpm and no change from EKG done November 5    Critical Care time:  none               Results for orders placed or performed during the hospital encounter of 12/20/19 (from the past 24 hour(s))   CBC with platelets differential   Result Value Ref Range    WBC 7.7 4.0 - 11.0 10e9/L    RBC Count 4.72 4.4 - 5.9 10e12/L    Hemoglobin 15.2 13.3 - 17.7 g/dL    Hematocrit 46.0 40.0 - 53.0 %    MCV 98 78 - 100 fl    MCH 32.2 26.5 - 33.0 pg    MCHC 33.0  31.5 - 36.5 g/dL    RDW 11.8 10.0 - 15.0 %    Platelet Count 350 150 - 450 10e9/L    Diff Method Automated Method     % Neutrophils 62.0 %    % Lymphocytes 16.3 %    % Monocytes 8.9 %    % Eosinophils 10.9 %    % Basophils 1.6 %    % Immature Granulocytes 0.3 %    Nucleated RBCs 0 0 /100    Absolute Neutrophil 4.8 1.6 - 8.3 10e9/L    Absolute Lymphocytes 1.3 0.8 - 5.3 10e9/L    Absolute Monocytes 0.7 0.0 - 1.3 10e9/L    Absolute Eosinophils 0.8 (H) 0.0 - 0.7 10e9/L    Absolute Basophils 0.1 0.0 - 0.2 10e9/L    Abs Immature Granulocytes 0.0 0 - 0.4 10e9/L    Absolute Nucleated RBC 0.0    Comprehensive metabolic panel   Result Value Ref Range    Sodium 137 133 - 144 mmol/L    Potassium 3.7 3.4 - 5.3 mmol/L    Chloride 104 94 - 109 mmol/L    Carbon Dioxide 27 20 - 32 mmol/L    Anion Gap 6 3 - 14 mmol/L    Glucose 97 70 - 99 mg/dL    Urea Nitrogen 11 7 - 30 mg/dL    Creatinine 0.83 0.66 - 1.25 mg/dL    GFR Estimate >90 >60 mL/min/[1.73_m2]    GFR Estimate If Black >90 >60 mL/min/[1.73_m2]    Calcium 9.7 8.5 - 10.1 mg/dL    Bilirubin Total 0.5 0.2 - 1.3 mg/dL    Albumin 3.8 3.4 - 5.0 g/dL    Protein Total 8.4 6.8 - 8.8 g/dL    Alkaline Phosphatase 141 40 - 150 U/L    ALT 24 0 - 70 U/L    AST 17 0 - 45 U/L   Troponin I   Result Value Ref Range    Troponin I ES <0.015 0.000 - 0.045 ug/L   D dimer quantitative   Result Value Ref Range    D Dimer 5.4 (H) 0.0 - 0.50 ug/ml FEU   Nt probnp inpatient   Result Value Ref Range    N-Terminal Pro BNP Inpatient 22 0 - 900 pg/mL   XR Chest 2 Views    Narrative    CHEST TWO VIEWS  12/20/2019 3:24 PM     HISTORY: Chest pain.    COMPARISON: 11/20/2019.      Impression    IMPRESSION: No acute cardiopulmonary disease identified. Multiple  subacute left rib fractures again noted with incomplete healing. No  pneumothorax identified. Small left base pleural fluid is stable  versus just slightly smaller. Stable cardiac silhouette.    SIMBA GARCIA MD   CT Chest Pulmonary Embolism w Contrast     Narrative    CT CHEST PULMONARY EMBOLISM WITH CONTRAST  12/20/2019 5:17 PM    HISTORY:  Dyspnea, prolonged travel, D-dimer >5.    TECHNIQUE: Scans obtained from the apices through the diaphragm with  IV contrast. 91mL Isovue-370 IV injected. Radiation dose for this scan  was reduced using automated exposure control, adjustment of the mA  and/or kV according to patient size, or iterative reconstruction  technique.    COMPARISON:  CT chest 11/24/2019.    FINDINGS:  Vascular: No acute thoracic aortic abnormality. No evidence for  pulmonary embolism. Coronary artery calcifications noted.    Lungs and pleura: Moderate left pleural fluid again identified.  Although the volume of this fluid is stable at the mid to inferior  lungs, there appears to be somewhat loculated fluid at the left upper  lung newly identified series 5 image 23. No right effusion. No acute  airspace disease on the right. Some mild patchy groundglass opacities  at the aerated left lungs.    Lymph nodes: No significant change of several mediastinal lymph nodes,  some calcified.    Additional findings: Upper abdomen images are negative for any acute  abnormality.      Impression    IMPRESSION:   1. No pulmonary embolism or acute thoracic aortic abnormality.  Coronary artery calcifications noted.  2. Somewhat loculated left pleural fluid, increased at the left upper  hemithorax but stable at the mid to inferior left chest.  3. Some patchy groundglass opacities within the left lung could relate  to mild edema.    SIMBA GARCIA MD       Medications - No data to display    Assessments & Plan (with Medical Decision Making)     MDM: Roni Martin is a 53 year old male who presented with a history of multiple left-sided rib fractures and secondary pleural effusion that  underwent thoracentesis on 11/25 after having originally injured his chest in early November.   Persistent effusion since that time although decreasing in size and had been improving from rib  fractures but then presented here now only with cough recently that kept him awake but also with an increasing sense of dyspnea on exertion.  He had also just returned from a cross-country trip to [place] and back by car and that is approximately when his dyspnea on exertion had increased.  Therefore his differential diagnosis normally included symptoms related to his recent rib fracture and pleural effusion but also to the risk of pulmonary embolism.   acute coronary syndrome was also considered as a male with hypertension hyperlipidemia and borderline blood sugars at the age of 53.  Other potential complications from his persistent pleural effusion and rib fractures included pneumonia, pneumothorax, reaccumulation of effusion.  A d-dimer was elevated to 5.4 but the remainder of his testing was reassuring on CT.  He does have some effusion that may certainly be loculated at this point.  Remainder of his testing as noted was reassuring.  We discussed symptomatic management as described below and I recommended that he follow-up with thoracic surgery given the persistent effusion and possible loculated changes that have been persistently symptomatic for him.  I also discussed with him his elevated blood pressure of asked him to follow-up this week.  The diastolic was quite high and I have added on  A TSH to his labs.  I have reviewed the nursing notes.    I have reviewed the findings, diagnosis, plan and need for follow up with the patient.       New Prescriptions    No medications on file         ICD-10-CM    1. Pleural effusion J90 THORACIC SURGERY REFERRAL    follow-up thoracic surgery.   2. History of fracture of rib Z87.81 THORACIC SURGERY REFERRAL   3. Positive D dimer R79.89    4. Dyspnea on exertion R06.09    5. Cough R05     trial on tessalon perles.  ibuprofen 600 mg as needed.  tyle nol as needed.         12/20/2019   [facility] EMERGENCY DEPARTMENT     Oziel Prince MD  12/20/19 1630

## 2019-12-20 NOTE — ED NOTES
Pt presents to ED with complaints of continued shortness of breath, on 11/3 injured ribs 4 through 10 on left side. Pt reports he's been unable to sleep or get comfortable at night. Sleeping in a chair. Productive cough. Did have thoracentesis done on 11/25. Was unable to follow up after the 1 L of fluid was taken off due to a death in the family. Pain has been better controlled, but concerned about infection with increasing cough.

## 2019-12-20 NOTE — TELEPHONE ENCOUNTER
Kate called. States pt has been coughing for 2 weeks and feeling SOB. Recent thoracentesis. Advised needs to be seen. Advised to go to urgent care at noon. Viv Hawley RN

## 2019-12-20 NOTE — TELEPHONE ENCOUNTER
Reason for call:  Patient reporting a symptom    Symptom or request: Kate says Juliette has been seeing him for broken ribs and he had to have fluid drained from his lungs. She says he isn't sleeping, doesn't seem to be in pain but is coughing a lot. She wonders if he has fluid in his lungs again.  Have you been treated for this before? Yes        Phone Number patient can be reached at: Kate León [phone]  (Roni is there if you need to talk to him)    Best Time:  anytime    Can we leave a detailed message on this number:  YES    Call taken on 12/20/2019 at 11:20 AM by Kailee Smith

## 2019-12-20 NOTE — ED AVS SNAPSHOT
[facility] Emergency Department  [address] [facility] [address]  Phone:  [phone]  Fax:  [phone]                                    Roni Martin   MRN: [id number]    Department:  [facility] Emergency Department   Date of Visit:  12/20/2019           After Visit Summary Signature Page    I have received my discharge instructions, and my questions have been answered. I have discussed any challenges I see with this plan with the nurse or doctor.    ..........................................................................................................................................  Patient/Patient Representative Signature      ..........................................................................................................................................  Patient Representative Print Name and Relationship to Patient    ..................................................               ................................................  Date                                   Time    ..........................................................................................................................................  Reviewed by Signature/Title    ...................................................              ..............................................  Date                                               Time          22EPIC Rev 08/18

## 2019-12-21 LAB — TSH SERPL DL<=0.005 MIU/L-ACNC: 1.53 MU/L (ref 0.4–4)

## 2019-12-21 NOTE — DISCHARGE INSTRUCTIONS
ICD-10-CM    1. Pleural effusion J90     follow-up thoracic surgery.   2. History of fracture of rib Z87.81    3. Positive D dimer R79.89    4. Dyspnea on exertion R06.09    5. Cough R05     trial on tessalon perles.  ibuprofen 600 mg as needed.  tyle nol as needed.

## 2019-12-27 NOTE — TELEPHONE ENCOUNTER
ONCOLOGY INTAKE: Records Information      APPT INFORMATION:  Referring provider:  Oziel Prince MD.  Referring provider s clinic:  [facility]  Reason for visit/diagnosis:Pleural effusion; History of fracture of rib  Has patient been notified of appointment date and time?: Yes    RECORDS INFORMATION:  Were the records received with the referral (via [employer])? No    Has patient been seen for any external appt for this diagnosis? No    If yes, where? N/a    Has patient had any imaging or procedures outside of [place] for this condition? No      If Yes, where? N/a    ADDITIONAL INFORMATION:  None

## 2020-01-13 ENCOUNTER — PRE VISIT (OUTPATIENT)
Dept: PULMONOLOGY | Facility: CLINIC | Age: 55
End: 2020-01-13

## 2020-03-12 DIAGNOSIS — J30.2 SEASONAL ALLERGIC RHINITIS, UNSPECIFIED TRIGGER: ICD-10-CM

## 2020-03-12 NOTE — TELEPHONE ENCOUNTER
"Requested Prescriptions   Pending Prescriptions Disp Refills     diltiazem ER (TIAZAC) 360 MG 24 hr ER beaded capsule [Pharmacy Med Name: DILTIAZEM 360MG ER] 90 capsule 0     Sig: TAKE 1 CAPSULE BY MOUTH ONCE DAILY       Calcium Channel Blockers Protocol  Failed - 3/12/2020 12:20 PM        Failed - Blood pressure under 140/90 in past 12 months     BP Readings from Last 3 Encounters:   12/20/19 (!) 168/120   11/25/19 (!) 148/92   11/20/19 120/82                 Failed - Medication is active on med list        Passed - Normal ALT in past 12 months     Recent Labs   Lab Test 12/20/19  1455   ALT 24             Passed - Recent (12 mo) or future (30 days) visit within the authorizing provider's specialty     Patient has had an office visit with the authorizing provider or a provider within the authorizing providers department within the previous 12 mos or has a future within next 30 days. See \"Patient Info\" tab in inbasket, or \"Choose Columns\" in Meds & Orders section of the refill encounter.              Passed - Patient is age 18 or older        Passed - Normal serum creatinine on file in past 12 months     Recent Labs   Lab Test 12/20/19  1455   CR 0.83       Ok to refill medication if creatinine is low             Last Written Prescription Date:  3/29/19  Last Fill Quantity: 90,  # refills: 3   Last office visit: 11/20/2019 with prescribing provider:     Future Office Visit:      "

## 2020-03-12 NOTE — TELEPHONE ENCOUNTER
Requested Prescriptions   Pending Prescriptions Disp Refills     fluticasone (FLONASE) 50 MCG/ACT nasal spray 48 mL 0     Sig: Spray 1-2 sprays into both nostrils daily       There is no refill protocol information for this order        Last Written Prescription Date:  6/24/2019  Last Fill Quantity: 48 ml,  # refills: 0   Last office visit: 11/20/2019 with prescribing provider:  Ana STONER   Future Office Visit:

## 2020-03-13 RX ORDER — DILTIAZEM HYDROCHLORIDE 360 MG/1
CAPSULE, EXTENDED RELEASE ORAL
Qty: 90 CAPSULE | Refills: 0 | OUTPATIENT
Start: 2020-03-13

## 2020-03-13 RX ORDER — FLUTICASONE PROPIONATE 50 MCG
1-2 SPRAY, SUSPENSION (ML) NASAL DAILY
Qty: 48 ML | Refills: 0 | Status: SHIPPED | OUTPATIENT
Start: 2020-03-13 | End: 2020-03-17

## 2020-03-13 NOTE — TELEPHONE ENCOUNTER
Spoke with pt, advised need for BP F/U. No home BP monitoring.  [facility] has DOT physical due next month.  Appt scheduled with DILIA Segovia, 03-17-20 for HTN F/U.   [facility] has adequate med supply for 1.5 wks. Refill per appt.  ARIELLA Rausch RN

## 2020-03-17 ENCOUNTER — OFFICE VISIT (OUTPATIENT)
Dept: FAMILY MEDICINE | Facility: CLINIC | Age: 55
End: 2020-03-17
Payer: COMMERCIAL

## 2020-03-17 VITALS
WEIGHT: 242 LBS | TEMPERATURE: 98 F | HEIGHT: 72 IN | RESPIRATION RATE: 18 BRPM | HEART RATE: 72 BPM | BODY MASS INDEX: 32.78 KG/M2 | SYSTOLIC BLOOD PRESSURE: 130 MMHG | DIASTOLIC BLOOD PRESSURE: 82 MMHG

## 2020-03-17 DIAGNOSIS — Z13.29 SCREENING FOR HYPOTHYROIDISM: ICD-10-CM

## 2020-03-17 DIAGNOSIS — I10 BENIGN ESSENTIAL HYPERTENSION: Primary | ICD-10-CM

## 2020-03-17 DIAGNOSIS — Z13.220 SCREENING FOR HYPERLIPIDEMIA: ICD-10-CM

## 2020-03-17 DIAGNOSIS — M10.9 GOUT, UNSPECIFIED CAUSE, UNSPECIFIED CHRONICITY, UNSPECIFIED SITE: ICD-10-CM

## 2020-03-17 DIAGNOSIS — J30.2 SEASONAL ALLERGIC RHINITIS, UNSPECIFIED TRIGGER: ICD-10-CM

## 2020-03-17 DIAGNOSIS — Z12.5 SCREENING FOR PROSTATE CANCER: ICD-10-CM

## 2020-03-17 DIAGNOSIS — N52.9 ERECTILE DYSFUNCTION, UNSPECIFIED ERECTILE DYSFUNCTION TYPE: ICD-10-CM

## 2020-03-17 LAB
ANION GAP SERPL CALCULATED.3IONS-SCNC: 3 MMOL/L (ref 3–14)
BUN SERPL-MCNC: 10 MG/DL (ref 7–30)
CALCIUM SERPL-MCNC: 9.3 MG/DL (ref 8.5–10.1)
CHLORIDE SERPL-SCNC: 104 MMOL/L (ref 94–109)
CHOLEST SERPL-MCNC: 281 MG/DL
CO2 SERPL-SCNC: 30 MMOL/L (ref 20–32)
CREAT SERPL-MCNC: 0.95 MG/DL (ref 0.66–1.25)
ERYTHROCYTE [DISTWIDTH] IN BLOOD BY AUTOMATED COUNT: 14 % (ref 10–15)
GFR SERPL CREATININE-BSD FRML MDRD: 89 ML/MIN/{1.73_M2}
GLUCOSE SERPL-MCNC: 115 MG/DL (ref 70–99)
HCT VFR BLD AUTO: 46.3 % (ref 40–53)
HDLC SERPL-MCNC: 111 MG/DL
HGB BLD-MCNC: 15.3 G/DL (ref 13.3–17.7)
LDLC SERPL CALC-MCNC: 154 MG/DL
MCH RBC QN AUTO: 31.7 PG (ref 26.5–33)
MCHC RBC AUTO-ENTMCNC: 33 G/DL (ref 31.5–36.5)
MCV RBC AUTO: 96 FL (ref 78–100)
NONHDLC SERPL-MCNC: 170 MG/DL
PLATELET # BLD AUTO: 254 10E9/L (ref 150–450)
POTASSIUM SERPL-SCNC: 4.4 MMOL/L (ref 3.4–5.3)
PSA SERPL-ACNC: 2.45 UG/L (ref 0–4)
RBC # BLD AUTO: 4.83 10E12/L (ref 4.4–5.9)
SODIUM SERPL-SCNC: 137 MMOL/L (ref 133–144)
TRIGL SERPL-MCNC: 81 MG/DL
TSH SERPL DL<=0.005 MIU/L-ACNC: 1.81 MU/L (ref 0.4–4)
URATE SERPL-MCNC: 8.8 MG/DL (ref 3.5–7.2)
WBC # BLD AUTO: 5.2 10E9/L (ref 4–11)

## 2020-03-17 PROCEDURE — G0103 PSA SCREENING: HCPCS | Performed by: NURSE PRACTITIONER

## 2020-03-17 PROCEDURE — 84443 ASSAY THYROID STIM HORMONE: CPT | Performed by: NURSE PRACTITIONER

## 2020-03-17 PROCEDURE — 84550 ASSAY OF BLOOD/URIC ACID: CPT | Performed by: NURSE PRACTITIONER

## 2020-03-17 PROCEDURE — 36415 COLL VENOUS BLD VENIPUNCTURE: CPT | Performed by: NURSE PRACTITIONER

## 2020-03-17 PROCEDURE — 80048 BASIC METABOLIC PNL TOTAL CA: CPT | Performed by: NURSE PRACTITIONER

## 2020-03-17 PROCEDURE — 99214 OFFICE O/P EST MOD 30 MIN: CPT | Performed by: NURSE PRACTITIONER

## 2020-03-17 PROCEDURE — 85027 COMPLETE CBC AUTOMATED: CPT | Performed by: NURSE PRACTITIONER

## 2020-03-17 PROCEDURE — 80061 LIPID PANEL: CPT | Performed by: NURSE PRACTITIONER

## 2020-03-17 RX ORDER — DILTIAZEM HYDROCHLORIDE 360 MG/1
360 CAPSULE, EXTENDED RELEASE ORAL DAILY
Qty: 90 CAPSULE | Refills: 3 | Status: SHIPPED | OUTPATIENT
Start: 2020-03-17 | End: 2022-06-22

## 2020-03-17 RX ORDER — LISINOPRIL 10 MG/1
10 TABLET ORAL DAILY
Qty: 90 TABLET | Refills: 0 | Status: SHIPPED | OUTPATIENT
Start: 2020-03-17 | End: 2020-06-10

## 2020-03-17 RX ORDER — FLUTICASONE PROPIONATE 50 MCG
1-2 SPRAY, SUSPENSION (ML) NASAL DAILY
Qty: 48 ML | Refills: 0 | Status: SHIPPED | OUTPATIENT
Start: 2020-03-17 | End: 2020-09-10

## 2020-03-17 ASSESSMENT — MIFFLIN-ST. JEOR: SCORE: 1965.7

## 2020-03-17 NOTE — PROGRESS NOTES
Subjective     Roni Martin is a 54 year old male who presents to clinic today for the following health issues:    HPI   Hypertension Follow-up      Do you check your blood pressure regularly outside of the clinic? No     Are you following a low salt diet? No    Are your blood pressures ever more than 140 on the top number (systolic) OR more   than 90 on the bottom number (diastolic), for example 140/90? No      How many servings of fruits and vegetables do you eat daily?  0-1    On average, how many sweetened beverages do you drink each day (Examples: soda, juice, sweet tea, etc.  Do NOT count diet or artificially sweetened beverages)?   1    How many days per week do you exercise enough to make your heart beat faster? 3 or less    How many minutes a day do you exercise enough to make your heart beat faster? 9 or less    How many days per week do you miss taking your medication? 0      Patient Active Problem List   Diagnosis     Obesity     Benign essential hypertension     Elevated fasting blood sugar     Dyslipidemia     Past Surgical History:   Procedure Laterality Date     IR THORACENTESIS  11/25/2019     THORACENTESIS Left 11/25/2019    Procedure: THORACENTESIS;  Surgeon: Gal Will PA-C;  Location:  OR       Social History     Tobacco Use     Smoking status: Never Smoker     Smokeless tobacco: Never Used   Substance Use Topics     Alcohol use: Yes     Family History   Problem Relation Age of Onset     Parkinsonism Mother      Diabetes Father      Hypertension Father      Hyperlipidemia No family hx of      Heart Disease No family hx of          Current Outpatient Medications   Medication Sig Dispense Refill     acetaminophen (TYLENOL) 325 MG tablet Take 1-2 tablets (325-650 mg) by mouth every 6 hours as needed for mild pain 60 tablet 0     diltiazem ER COATED BEADS (CARDIZEM CD) 360 MG 24 hr capsule Take 1 capsule (360 mg) by mouth daily 90 capsule 3     fexofenadine (ALLEGRA) 60 MG tablet Take 60  mg by mouth daily       fluticasone (FLONASE) 50 MCG/ACT nasal spray Spray 1-2 sprays into both nostrils daily 48 mL 0     ibuprofen (ADVIL/MOTRIN) 600 MG tablet Take 1 tablet (600 mg) by mouth every 8 hours as needed for moderate pain 60 tablet 0     ORDER FOR DME, SET TO LOCAL PRINT, Equipment being ordered: automated home BP cuff 1 Units 0     No Known Allergies  Recent Labs   Lab Test 12/20/19  1455 11/05/19  2131 03/29/19  0932 05/18/15  0940   A1C  --   --  5.3  --    LDL  --   --  175* 159*   HDL  --   --  81 86   TRIG  --   --  82 58   ALT 24 30  --   --    CR 0.83 0.77 0.91 0.91   GFRESTIMATED >90 >90 >90 87   GFRESTBLACK >90 >90 >90 >90  African American GFR Calc     POTASSIUM 3.7 3.3*  --  4.6   TSH 1.53  --   --  1.53      BP Readings from Last 3 Encounters:   03/17/20 130/82   12/20/19 (!) 168/120   11/25/19 (!) 148/92    Wt Readings from Last 3 Encounters:   03/17/20 109.8 kg (242 lb)   12/20/19 106.6 kg (235 lb)   11/25/19 108.9 kg (240 lb)                    Reviewed and updated as needed this visit by Provider         Review of Systems   ROS COMP: Constitutional, HEENT, cardiovascular, pulmonary, GI, , musculoskeletal, neuro, skin, endocrine and psych systems are negative, except as otherwise noted.      Objective    /82 (BP Location: Right arm, Cuff Size: Adult Large)   Pulse 72   Temp 98  F (36.7  C) (Tympanic)   Resp 18   Ht 1.829 m (6')   Wt 109.8 kg (242 lb)   BMI 32.82 kg/m    Body mass index is 32.82 kg/m .  Physical Exam   GENERAL: healthy, alert and no distress  EYES: Eyes grossly normal to inspection, PERRL and conjunctivae and sclerae normal  HENT: ear canals and TM's normal, nose and mouth without ulcers or lesions  NECK: no adenopathy, no asymmetry, masses, or scars and thyroid normal to palpation  RESP: lungs clear to auscultation - no rales, rhonchi or wheezes  CV: regular rate and rhythm, normal S1 S2, no S3 or S4, no murmur, click or rub, no peripheral edema and  peripheral pulses strong  ABDOMEN: soft, nontender, no hepatosplenomegaly, no masses and bowel sounds normal  MS: no gross musculoskeletal defects noted, no edema  SKIN: no suspicious lesions or rashes  NEURO: Normal strength and tone, mentation intact and speech normal  PSYCH: mentation appears normal, affect normal/bright    Results for orders placed or performed in visit on 03/17/20   Basic metabolic panel     Status: Abnormal   Result Value Ref Range    Sodium 137 133 - 144 mmol/L    Potassium 4.4 3.4 - 5.3 mmol/L    Chloride 104 94 - 109 mmol/L    Carbon Dioxide 30 20 - 32 mmol/L    Anion Gap 3 3 - 14 mmol/L    Glucose 115 (H) 70 - 99 mg/dL    Urea Nitrogen 10 7 - 30 mg/dL    Creatinine 0.95 0.66 - 1.25 mg/dL    GFR Estimate 89 >60 mL/min/[1.73_m2]    GFR Estimate If Black >90 >60 mL/min/[1.73_m2]    Calcium 9.3 8.5 - 10.1 mg/dL   CBC with platelets     Status: None   Result Value Ref Range    WBC 5.2 4.0 - 11.0 10e9/L    RBC Count 4.83 4.4 - 5.9 10e12/L    Hemoglobin 15.3 13.3 - 17.7 g/dL    Hematocrit 46.3 40.0 - 53.0 %    MCV 96 78 - 100 fl    MCH 31.7 26.5 - 33.0 pg    MCHC 33.0 31.5 - 36.5 g/dL    RDW 14.0 10.0 - 15.0 %    Platelet Count 254 150 - 450 10e9/L   TSH with free T4 reflex     Status: None   Result Value Ref Range    TSH 1.81 0.40 - 4.00 mU/L   Lipid panel reflex to direct LDL Fasting     Status: Abnormal   Result Value Ref Range    Cholesterol 281 (H) <200 mg/dL    Triglycerides 81 <150 mg/dL    HDL Cholesterol 111 >39 mg/dL    LDL Cholesterol Calculated 154 (H) <100 mg/dL    Non HDL Cholesterol 170 (H) <130 mg/dL   Uric acid     Status: Abnormal   Result Value Ref Range    Uric Acid 8.8 (H) 3.5 - 7.2 mg/dL   PSA, screen     Status: None   Result Value Ref Range    PSA 2.45 0 - 4 ug/L     The ASCVD Risk score (Princess SQUIRES Jr., et al., 2013) failed to calculate for the following reasons:    The valid HDL cholesterol range is 20 to 100 mg/dL          Assessment & Plan     (I10) Benign essential  hypertension  (primary encounter diagnosis)  Comment: Hypertension controlled with current dose of medications renewed today  Plan: diltiazem ER COATED BEADS (CARDIZEM CD) 360 MG         24 hr capsule, Basic metabolic panel, CBC with         platelets, lisinopril (ZESTRIL) 10 MG tablet      (J30.2) Seasonal allergic rhinitis, unspecified trigger  Comment: Controlled with Flonase renewed today  Plan: fluticasone (FLONASE) 50 MCG/ACT nasal spray         (Z13.220) Screening for hyperlipidemia  Comment: Controlled patient will continue to work with low-cholesterol diet and increased exercise  Plan: Lipid panel reflex to direct LDL Fasting      (M10.9) Gout, unspecified cause, unspecified chronicity, unspecified site  Comment: Patient has recent multiple flareups with his gout uric acid is mildly elevated does not have any current gout symptoms will attempt to start allopurinol on a slow taper to help control his future gout flareups  Plan: Uric acid, allopurinol (ZYLOPRIM) 100 MG tablet      (Z13.29) Screening for hypothyroidism  Comment: Thyroid within normal limits  Plan: TSH with free T4 reflex      (Z12.5) Screening for prostate cancer  Comment: PSA within normal limits  Plan: PSA, screen    (N52.9) Erectile dysfunction, unspecified erectile dysfunction type  Comment: Patient has noticed increased symptoms of ED will attempt some Viagra patient to monitor blood pressure  Plan: sildenafil (VIAGRA) 25 MG tablet         BMI:   Estimated body mass index is 32.82 kg/m  as calculated from the following:    Height as of this encounter: 1.829 m (6').    Weight as of this encounter: 109.8 kg (242 lb).   Weight management plan: Discussed healthy diet and exercise guidelines        See Patient Instructions    No follow-ups on file.    RITIKA Mendoza [facility]

## 2020-03-19 RX ORDER — SILDENAFIL 25 MG/1
25 TABLET, FILM COATED ORAL DAILY PRN
Qty: 9 TABLET | Refills: 1 | Status: SHIPPED | OUTPATIENT
Start: 2020-03-19 | End: 2023-03-23

## 2020-03-19 RX ORDER — ALLOPURINOL 100 MG/1
TABLET ORAL
Qty: 49 TABLET | Refills: 0 | Status: SHIPPED | OUTPATIENT
Start: 2020-03-19 | End: 2020-04-14

## 2020-03-19 NOTE — RESULT ENCOUNTER NOTE
Notes recorded by Juliette Villalobos APRN CNP on 3/19/2020 at 9:01 AM CDT   Notify Roni that his thyroid was within normal limits.  His PSA was within normal limits his cholesterol numbers are mildly elevated with a total cholesterol at 281 like to see his cholesterol numbers below 200.  His good cholesterol which is HDL is 111 which is really good based on these numbers would not recommend medication implementation but continue with low-cholesterol diet and increased exercise.     His uric acid did come back mildly elevated at 8.8 as we discussed we will need to start allopurinol to control his gout flareups if he is not having symptoms he can start the allopurinol now but based on the uric acid level we will start with a slow tapering of 100 mg for 2 weeks then go up to 200 mg for a week and then up to 300 mg for a week.  When we do refill we do have the 300 mg tablets that we can refill at.     Also let him know I did send in his prescription for the Viagra if he has any further questions please let me know     Juliette Villalobos CNP

## 2020-04-13 DIAGNOSIS — M10.9 GOUT, UNSPECIFIED CAUSE, UNSPECIFIED CHRONICITY, UNSPECIFIED SITE: ICD-10-CM

## 2020-04-13 NOTE — TELEPHONE ENCOUNTER
"Requested Prescriptions   Pending Prescriptions Disp Refills     allopurinol (ZYLOPRIM) 100 MG tablet [Pharmacy Med Name: ALLOPURINOL 100MG] 49 tablet 0     Sig: TAKE 1 TABLET BY MOUTH ONCE DAILY FOR 14 DAYS, THEN 2 TABLETS ONCE DAILY FOR 7 DAYS, THEN 3 TABLETS ONCE DAILY FOR 7 DAYS       Gout Agents Protocol Failed - 4/13/2020  1:33 PM        Failed - Has Uric Acid on file in past 12 months and value is less than 6     Recent Labs   Lab Test 03/17/20  1136   URIC 8.8*     If level is 6mg/dL or greater, ok to refill one time and refer to provider.           Passed - CBC on file in past 12 months     Recent Labs   Lab Test 03/17/20  1136   WBC 5.2   RBC 4.83   HGB 15.3   HCT 46.3                    Passed - ALT on file in past 12 months     Recent Labs   Lab Test 12/20/19  1455   ALT 24             Passed - Recent (12 mo) or future (30 days) visit within the authorizing provider's specialty     Patient has had an office visit with the authorizing provider or a provider within the authorizing providers department within the previous 12 mos or has a future within next 30 days. See \"Patient Info\" tab in inbasket, or \"Choose Columns\" in Meds & Orders section of the refill encounter.              Passed - Medication is active on med list        Passed - Patient is age 18 or older        Passed - Normal serum creatinine on file in the past 12 months     Recent Labs   Lab Test 03/17/20  1136   CR 0.95       Ok to refill medication if creatinine is low             Last Written Prescription Date:  3/19/20  Last Fill Quantity: 49,  # refills: 0   Last office visit: 3/17/2020 with prescribing provider:  Wilfredo AVILEZ   Future Office Visit:      "

## 2020-04-14 RX ORDER — ALLOPURINOL 300 MG/1
300 TABLET ORAL DAILY
Qty: 90 TABLET | Refills: 1 | Status: SHIPPED | OUTPATIENT
Start: 2020-04-14 | End: 2020-09-10

## 2020-05-07 ENCOUNTER — TELEPHONE (OUTPATIENT)
Dept: FAMILY MEDICINE | Facility: CLINIC | Age: 55
End: 2020-05-07

## 2020-05-07 NOTE — TELEPHONE ENCOUNTER
Pt called, med list states he is taking Allergra. He should not be taking claritin and allergra. Need to clarify. LM to [facility]. Viv Hawley RN

## 2020-05-07 NOTE — TELEPHONE ENCOUNTER
Claritin prescribe in 2017.  Pt is looking for a refill.    [facility]    [facility] [place]

## 2020-05-13 DIAGNOSIS — J30.2 SEASONAL ALLERGIC RHINITIS, UNSPECIFIED TRIGGER: Primary | ICD-10-CM

## 2020-05-13 NOTE — TELEPHONE ENCOUNTER
Reason for Call:  Patients wife called and her  would prefer to take Claritin D instead of allegra -    Detailed comments:     Phone Number Patient can be reached at: Home number on file [phone] (home)    Best Time:     Can we leave a detailed message on this number? YES    Call taken on 5/13/2020 at 3:00 PM by Frances Demarco    May 7, 2020   Me           3:59 PM   Note      Pt called, med list states he is taking Allergra. He should not be taking claritin and allergra. Need to clarify. [facility] Viv Hawley RN

## 2020-06-10 DIAGNOSIS — I10 BENIGN ESSENTIAL HYPERTENSION: ICD-10-CM

## 2020-06-10 RX ORDER — LISINOPRIL 10 MG/1
TABLET ORAL
Qty: 90 TABLET | Refills: 0 | Status: SHIPPED | OUTPATIENT
Start: 2020-06-10 | End: 2020-09-10

## 2020-08-14 DIAGNOSIS — J30.2 SEASONAL ALLERGIC RHINITIS, UNSPECIFIED TRIGGER: ICD-10-CM

## 2020-08-18 RX ORDER — LORATADINE AND PSEUDOEPHEDRINE SULFATE 10; 240 MG/1; MG/1
TABLET, FILM COATED, EXTENDED RELEASE ORAL
Qty: 30 TABLET | Refills: 1 | Status: SHIPPED | OUTPATIENT
Start: 2020-08-18 | End: 2020-11-18

## 2020-08-18 NOTE — TELEPHONE ENCOUNTER
Routing refill request to provider for review/approval because:  Drug not on the [facility] refill protocol     Requested Prescriptions   Pending Prescriptions Disp Refills     ALLERGY RELIEF/NASAL DECONGEST  MG 24 hr tablet [Pharmacy Med Name: ALLERGY RELF DECONGES] 30 tablet 1     Sig: TAKE 1 TABLET BY MOUTH DAILY       There is no refill protocol information for this order        Destiny DAO RN, BSN

## 2020-11-16 DIAGNOSIS — J30.2 SEASONAL ALLERGIC RHINITIS, UNSPECIFIED TRIGGER: ICD-10-CM

## 2020-11-18 RX ORDER — LORATADINE AND PSEUDOEPHEDRINE SULFATE 10; 240 MG/1; MG/1
TABLET, FILM COATED, EXTENDED RELEASE ORAL
Qty: 30 TABLET | Refills: 0 | Status: SHIPPED | OUTPATIENT
Start: 2020-11-18 | End: 2020-12-31

## 2020-12-01 ENCOUNTER — VIRTUAL VISIT (OUTPATIENT)
Dept: FAMILY MEDICINE | Facility: CLINIC | Age: 55
End: 2020-12-01
Payer: COMMERCIAL

## 2020-12-01 DIAGNOSIS — Z20.822 SUSPECTED COVID-19 VIRUS INFECTION: Primary | ICD-10-CM

## 2020-12-01 PROCEDURE — 99213 OFFICE O/P EST LOW 20 MIN: CPT | Mod: 95 | Performed by: NURSE PRACTITIONER

## 2020-12-01 NOTE — PROGRESS NOTES
"Roni Martin is a 54 year old male who is being evaluated via a billable telephone visit.      The patient has been notified of following:     \"This telephone visit will be conducted via a call between you and your physician/provider. We have found that certain health care needs can be provided without the need for a physical exam.  This service lets us provide the care you need with a short phone conversation.  If a prescription is necessary we can send it directly to your pharmacy.  If lab work is needed we can place an order for that and you can then stop by our lab to have the test done at a later time.    Telephone visits are billed at different rates depending on your insurance coverage. During this emergency period, for some insurers they may be billed the same as an in-person visit.  Please reach out to your insurance provider with any questions.    If during the course of the call the physician/provider feels a telephone visit is not appropriate, you will not be charged for this service.\"    Patient has given verbal consent for Telephone visit?  Yes    What phone number would you like to be contacted at? [phone]    How would you like to obtain your AVS? Mail a copy    Subjective     Roni Martin is a 54 year old male who presents via phone visit today for the following health issues:    HPI       Concern for COVID-19  About how many days ago did these symptoms start? 1 week  Is this your first visit for this illness? Yes  In the 14 days before your symptoms started, have you had close contact with someone with COVID-19 (Coronavirus)? No  Do you have a fever or chills? No  Are you having new or worsening difficulty breathing? Yes   Please describe what kind of difficulty you are having breathing:Mild dyspnea (decreased exercise tolerance, able to do ADLs without difficulty)  Do you have new or worsening cough? Yes, I am coughing up mucus.  Have you had any new or unexplained body aches? No    Have you " experienced any of the following NEW symptoms?    Headache: YES    Sore throat: YES in AM    Loss of taste or smell: No    Chest pain: No    Diarrhea: YES    Rash: No  What treatments have you tried? claritin and nasal decongestant is some help  Who do you live with? wife  Are you, or a household member, a healthcare worker or a ? No  Do you live in a nursing home, group home, or shelter? No  Do you have a way to get food/medications if quarantined? NO, we have plenty of food here                Review of Systems   Constitutional, HEENT, cardiovascular, pulmonary, gi and gu systems are negative, except as otherwise noted.       Objective          Vitals:  No vitals were obtained today due to virtual visit.    healthy, alert and no distress  PSYCH: Alert and oriented times 3; coherent speech, normal   rate and volume, able to articulate logical thoughts, able   to abstract reason, no tangential thoughts, no hallucinations   or delusions  His affect is normal  RESP: No cough, no audible wheezing, able to talk in full sentences  Remainder of exam unable to be completed due to telephone visits    No results found for any visits on 12/01/20.        Assessment/Plan:    Assessment & Plan     Roni was seen today for covid concern.    Diagnoses and all orders for this visit:    Suspected COVID-19 virus infection  -     Symptomatic COVID-19 Virus (Coronavirus) by PCR; Future         BMI:   Estimated body mass index is 32.82 kg/m  as calculated from the following:    Height as of 3/17/20: 1.829 m (6').    Weight as of 3/17/20: 109.8 kg (242 lb).                No follow-ups on file.    RITIKA Mendoza [facility]    Phone call duration:  11  minutes

## 2020-12-03 DIAGNOSIS — Z20.822 SUSPECTED COVID-19 VIRUS INFECTION: ICD-10-CM

## 2020-12-03 PROCEDURE — U0003 INFECTIOUS AGENT DETECTION BY NUCLEIC ACID (DNA OR RNA); SEVERE ACUTE RESPIRATORY SYNDROME CORONAVIRUS 2 (SARS-COV-2) (CORONAVIRUS DISEASE [COVID-19]), AMPLIFIED PROBE TECHNIQUE, MAKING USE OF HIGH THROUGHPUT TECHNOLOGIES AS DESCRIBED BY CMS-2020-01-R: HCPCS | Performed by: NURSE PRACTITIONER

## 2020-12-04 LAB
SARS-COV-2 RNA SPEC QL NAA+PROBE: NOT DETECTED
SPECIMEN SOURCE: NORMAL

## 2020-12-05 ENCOUNTER — NURSE TRIAGE (OUTPATIENT)
Dept: NURSING | Facility: CLINIC | Age: 55
End: 2020-12-05

## 2020-12-05 NOTE — TELEPHONE ENCOUNTER
Coronavirus (COVID-19) Notification    Lab Result   Lab test 2019-nCoV rRt-PCR OR SARS-COV-2 PCR    Nasopharyngeal AND/OR Oropharyngeal swab is NEGATIVE for 2019-nCoV RNA [OR] SARS-COV-2 RNA (COVID-19) RNA    Your result was negative. This means that we didn't find the virus that causes COVID-19 in your sample. A test may show negative when you do actually have the virus. This can happen when the virus is in the early stages of infection, before you feel illness symptoms.    If you have symptoms   Stay home and away from others (self-isolate) until you meet ALL of the guidelines below:    You've had no fever--and no medicine that reduces fever--for 1 full day (24 hours). And      Your other symptoms have gotten better. For example, your cough or breathing has improved. And   ; At least 10 days have passed since your symptoms started. (If you've been told by a doctor that you have a weak immune system, wait 20 days.)         During this time:    Stay home. Don't go to work, school or anywhere else.     Stay in your own room, including for meals. Use your own bathroom if you can.    Stay away from others in your home. No hugging, kissing or shaking hands. No visitors.    Clean  high touch  surfaces often (doorknobs, counters, handles, etc.). Use a household cleaning spray or wipes. You can find a full list on the EPA website at www.epa.gov/pesticide-registration/list-n-disinfectants-use-against-sars-cov-2.    Cover your mouth and nose with a mask, tissue or other face covering to avoid spreading germs.    Wash your hands and face often with soap and water.    Going back to work  Check with your employer for any guidelines to follow for going back to work.  You are sent a letter for your Employer which will serve as formal document notice that you, the employee, tested negative for COVID-19, as of the testing date shown above.    If your symptoms worsen or other concerning symptoms, contact PCP, oncare or consider  returning to Emergency Dept.    Where can I get more information?    [facility]: [email or web address]    Coronavirus Basics: www.health.[facility].mn.us/diseases/coronavirus/basics.html    [facility] Hotline (472-296-8535)    Patricia Barger RN  [facility] Triage Nurse Advisor

## 2020-12-07 DIAGNOSIS — J30.2 SEASONAL ALLERGIC RHINITIS, UNSPECIFIED TRIGGER: ICD-10-CM

## 2020-12-07 RX ORDER — FLUTICASONE PROPIONATE 50 MCG
1-2 SPRAY, SUSPENSION (ML) NASAL DAILY
Qty: 48 G | Refills: 1 | Status: SHIPPED | OUTPATIENT
Start: 2020-12-07 | End: 2021-06-08

## 2020-12-07 NOTE — TELEPHONE ENCOUNTER
Prescription approved per [facility] Refill Protocol.   [Follow-Up Visit] : a follow-up visit for [Immune Thrombocytopenic Purpura] : immune thrombocytopenic purpura [Patient] : patient [Mother] : mother

## 2020-12-28 DIAGNOSIS — J30.2 SEASONAL ALLERGIC RHINITIS, UNSPECIFIED TRIGGER: ICD-10-CM

## 2020-12-31 RX ORDER — LORATADINE AND PSEUDOEPHEDRINE SULFATE 10; 240 MG/1; MG/1
TABLET, FILM COATED, EXTENDED RELEASE ORAL
Qty: 30 TABLET | Refills: 4 | Status: SHIPPED | OUTPATIENT
Start: 2020-12-31 | End: 2022-05-25

## 2020-12-31 NOTE — TELEPHONE ENCOUNTER
Requested Prescriptions   Pending Prescriptions Disp Refills     ALLERGY RELIEF/NASAL DECONGEST  MG 24 hr tablet [Pharmacy Med Name: ALLERGY RELF DECONGES] 30 tablet 11     Sig: TAKE 1 TABLET BY MOUTH ONCE DAILY       There is no refill protocol information for this order        Last Written Prescription Date:  11/18/20  Last Fill Quantity: 30,  # refills: 0   Last office visit: 3/17/2020 with prescribing provider:     Future Office Visit:

## 2021-03-08 DIAGNOSIS — I10 BENIGN ESSENTIAL HYPERTENSION: ICD-10-CM

## 2021-03-10 RX ORDER — DILTIAZEM HYDROCHLORIDE 360 MG/1
CAPSULE, EXTENDED RELEASE ORAL
Qty: 30 CAPSULE | Refills: 0 | Status: SHIPPED | OUTPATIENT
Start: 2021-03-10 | End: 2021-04-13

## 2021-03-10 NOTE — TELEPHONE ENCOUNTER
"Requested Prescriptions   Pending Prescriptions Disp Refills     diltiazem ER (TIAZAC) 360 MG 24 hr ER beaded capsule [Pharmacy Med Name: DILTIAZEM 360MG ER] 90 capsule 2     Sig: TAKE 1 CAPSULE BY MOUTH ONCE DAILY       Calcium Channel Blockers Protocol  Failed - 3/8/2021 12:15 PM        Failed - Normal ALT in past 12 months     Recent Labs   Lab Test 12/20/19  1455   ALT 24             Failed - Medication is active on med list        Passed - Blood pressure under 140/90 in past 12 months     BP Readings from Last 3 Encounters:   03/17/20 130/82   12/20/19 (!) 168/120   11/25/19 (!) 148/92                 Passed - Recent (12 mo) or future (30 days) visit within the authorizing provider's specialty     Patient has had an office visit with the authorizing provider or a provider within the authorizing providers department within the previous 12 mos or has a future within next 30 days. See \"Patient Info\" tab in inbasket, or \"Choose Columns\" in Meds & Orders section of the refill encounter.              Passed - Patient is age 18 or older        Passed - Normal serum creatinine on file in past 12 months     Recent Labs   Lab Test 03/17/20  1136   CR 0.95       Ok to refill medication if creatinine is low             He denies any family history of ulcer disease or gallbladder disease.  He denies any family history of inflammatory bowel disease or irritable bowel syndrome.    "

## 2021-04-05 DIAGNOSIS — I10 BENIGN ESSENTIAL HYPERTENSION: ICD-10-CM

## 2021-04-06 NOTE — TELEPHONE ENCOUNTER
Left message for patient to return call. Patient needs appt/labs for further refills.  Brina DAO RN

## 2021-04-12 RX ORDER — DILTIAZEM HYDROCHLORIDE 360 MG/1
CAPSULE, EXTENDED RELEASE ORAL
Qty: 30 CAPSULE | Refills: 0 | OUTPATIENT
Start: 2021-04-12

## 2021-04-13 ENCOUNTER — OFFICE VISIT (OUTPATIENT)
Dept: FAMILY MEDICINE | Facility: CLINIC | Age: 56
End: 2021-04-13
Payer: COMMERCIAL

## 2021-04-13 VITALS
SYSTOLIC BLOOD PRESSURE: 136 MMHG | TEMPERATURE: 98 F | WEIGHT: 236 LBS | DIASTOLIC BLOOD PRESSURE: 80 MMHG | HEIGHT: 72 IN | BODY MASS INDEX: 31.97 KG/M2 | HEART RATE: 96 BPM

## 2021-04-13 DIAGNOSIS — J30.89 SEASONAL ALLERGIC RHINITIS DUE TO OTHER ALLERGIC TRIGGER: Primary | ICD-10-CM

## 2021-04-13 DIAGNOSIS — M10.9 GOUT, UNSPECIFIED CAUSE, UNSPECIFIED CHRONICITY, UNSPECIFIED SITE: ICD-10-CM

## 2021-04-13 DIAGNOSIS — E78.5 HYPERLIPIDEMIA LDL GOAL <130: ICD-10-CM

## 2021-04-13 DIAGNOSIS — I10 BENIGN ESSENTIAL HYPERTENSION: ICD-10-CM

## 2021-04-13 LAB
ALBUMIN SERPL-MCNC: 3.8 G/DL (ref 3.4–5)
ALP SERPL-CCNC: 75 U/L (ref 40–150)
ALT SERPL W P-5'-P-CCNC: 29 U/L (ref 0–70)
ANION GAP SERPL CALCULATED.3IONS-SCNC: 4 MMOL/L (ref 3–14)
AST SERPL W P-5'-P-CCNC: 27 U/L (ref 0–45)
BILIRUB SERPL-MCNC: 0.3 MG/DL (ref 0.2–1.3)
BUN SERPL-MCNC: 10 MG/DL (ref 7–30)
CALCIUM SERPL-MCNC: 8.9 MG/DL (ref 8.5–10.1)
CHLORIDE SERPL-SCNC: 105 MMOL/L (ref 94–109)
CHOLEST SERPL-MCNC: 285 MG/DL
CO2 SERPL-SCNC: 28 MMOL/L (ref 20–32)
CREAT SERPL-MCNC: 0.88 MG/DL (ref 0.66–1.25)
ERYTHROCYTE [DISTWIDTH] IN BLOOD BY AUTOMATED COUNT: 12.7 % (ref 10–15)
GFR SERPL CREATININE-BSD FRML MDRD: >90 ML/MIN/{1.73_M2}
GLUCOSE SERPL-MCNC: 94 MG/DL (ref 70–99)
HCT VFR BLD AUTO: 42 % (ref 40–53)
HDLC SERPL-MCNC: 81 MG/DL
HGB BLD-MCNC: 14.6 G/DL (ref 13.3–17.7)
LDLC SERPL CALC-MCNC: 172 MG/DL
MCH RBC QN AUTO: 34 PG (ref 26.5–33)
MCHC RBC AUTO-ENTMCNC: 34.8 G/DL (ref 31.5–36.5)
MCV RBC AUTO: 98 FL (ref 78–100)
NONHDLC SERPL-MCNC: 204 MG/DL
PLATELET # BLD AUTO: 242 10E9/L (ref 150–450)
POTASSIUM SERPL-SCNC: 4.3 MMOL/L (ref 3.4–5.3)
PROT SERPL-MCNC: 7.3 G/DL (ref 6.8–8.8)
RBC # BLD AUTO: 4.3 10E12/L (ref 4.4–5.9)
SODIUM SERPL-SCNC: 137 MMOL/L (ref 133–144)
TRIGL SERPL-MCNC: 161 MG/DL
WBC # BLD AUTO: 4.8 10E9/L (ref 4–11)

## 2021-04-13 PROCEDURE — 99214 OFFICE O/P EST MOD 30 MIN: CPT | Performed by: NURSE PRACTITIONER

## 2021-04-13 PROCEDURE — 80053 COMPREHEN METABOLIC PANEL: CPT | Performed by: NURSE PRACTITIONER

## 2021-04-13 PROCEDURE — 80061 LIPID PANEL: CPT | Performed by: NURSE PRACTITIONER

## 2021-04-13 PROCEDURE — 85027 COMPLETE CBC AUTOMATED: CPT | Performed by: NURSE PRACTITIONER

## 2021-04-13 PROCEDURE — 36415 COLL VENOUS BLD VENIPUNCTURE: CPT | Performed by: NURSE PRACTITIONER

## 2021-04-13 RX ORDER — LISINOPRIL 10 MG/1
10 TABLET ORAL DAILY
Qty: 90 TABLET | Refills: 3 | Status: SHIPPED | OUTPATIENT
Start: 2021-04-13 | End: 2022-05-25

## 2021-04-13 RX ORDER — LORATADINE PSEUDOEPHEDRINE SULFATE 10; 240 MG/1; MG/1
TABLET, EXTENDED RELEASE ORAL
Qty: 90 TABLET | Refills: 0 | Status: SHIPPED | OUTPATIENT
Start: 2021-04-13 | End: 2021-07-26

## 2021-04-13 RX ORDER — ALLOPURINOL 300 MG/1
1 TABLET ORAL DAILY
Qty: 90 TABLET | Refills: 3 | Status: SHIPPED | OUTPATIENT
Start: 2021-04-13 | End: 2022-05-25

## 2021-04-13 RX ORDER — DILTIAZEM HYDROCHLORIDE 360 MG/1
CAPSULE, EXTENDED RELEASE ORAL
Qty: 90 CAPSULE | Refills: 3 | Status: SHIPPED | OUTPATIENT
Start: 2021-04-13 | End: 2022-02-28

## 2021-04-13 ASSESSMENT — MIFFLIN-ST. JEOR: SCORE: 1933.49

## 2021-04-13 NOTE — LETTER
April 15, 2021      Roni Martin  [address]  [place] [address]        Dear ,    We are writing to inform you of your test results.  test results his comprehensive panel is within normal limits indicating good kidney and liver function.  CBC was within normal limits with a good hemoglobin level.     Cholesterol numbers have increased with a total cholesterol at 285 relates that below 200 and his LDL is at 172 relates that below 100 based on these numbers his cardiac risk factor is 8.5 would recommend starting cholesterol medications.  If in agreement with the plan would start him on low-dose Lipitor 10 mg and recheck cholesterol levels in 6 months.     I have sent in a prescription for the Lipitor 10 mg has any further questions and contact me at the clinic     The 10-year ASCVD risk score (Princess SQUIRES Jr., et al., 2013) is: 8.5%     Values used to calculate the score:       Age: 55 years       Sex: Male       Is Non- : No       Diabetic: No       Tobacco smoker: No       Systolic Blood Pressure: 136 mmHg       Is BP treated: Yes       HDL Cholesterol: 81 mg/dL       Total Cholesterol: 285 mg/dL       Resulted Orders   Comprehensive metabolic panel   Result Value Ref Range    Sodium 137 133 - 144 mmol/L    Potassium 4.3 3.4 - 5.3 mmol/L    Chloride 105 94 - 109 mmol/L    Carbon Dioxide 28 20 - 32 mmol/L    Anion Gap 4 3 - 14 mmol/L    Glucose 94 70 - 99 mg/dL      Comment:      Fasting specimen    Urea Nitrogen 10 7 - 30 mg/dL    Creatinine 0.88 0.66 - 1.25 mg/dL    GFR Estimate >90 >60 mL/min/[1.73_m2]      Comment:      Non  GFR Calc  Starting 12/18/2018, serum creatinine based estimated GFR (eGFR) will be   calculated using the Chronic Kidney Disease Epidemiology Collaboration   (CKD-EPI) equation.      GFR Estimate If Black >90 >60 mL/min/[1.73_m2]      Comment:       GFR Calc  Starting 12/18/2018, serum creatinine based estimated GFR (eGFR) will be    calculated using the Chronic Kidney Disease Epidemiology Collaboration   (CKD-EPI) equation.      Calcium 8.9 8.5 - 10.1 mg/dL    Bilirubin Total 0.3 0.2 - 1.3 mg/dL    Albumin 3.8 3.4 - 5.0 g/dL    Protein Total 7.3 6.8 - 8.8 g/dL    Alkaline Phosphatase 75 40 - 150 U/L    ALT 29 0 - 70 U/L    AST 27 0 - 45 U/L   CBC with platelets   Result Value Ref Range    WBC 4.8 4.0 - 11.0 10e9/L    RBC Count 4.30 (L) 4.4 - 5.9 10e12/L    Hemoglobin 14.6 13.3 - 17.7 g/dL    Hematocrit 42.0 40.0 - 53.0 %    MCV 98 78 - 100 fl    MCH 34.0 (H) 26.5 - 33.0 pg    MCHC 34.8 31.5 - 36.5 g/dL    RDW 12.7 10.0 - 15.0 %    Platelet Count 242 150 - 450 10e9/L   Lipid panel reflex to direct LDL Fasting   Result Value Ref Range    Cholesterol 285 (H) <200 mg/dL      Comment:      Desirable:       <200 mg/dl    Triglycerides 161 (H) <150 mg/dL      Comment:      Borderline high:  150-199 mg/dl  High:             200-499 mg/dl  Very high:       >499 mg/dl  Fasting specimen      HDL Cholesterol 81 >39 mg/dL    LDL Cholesterol Calculated 172 (H) <100 mg/dL      Comment:      Above desirable:  100-129 mg/dl  Borderline High:  130-159 mg/dL  High:             160-189 mg/dL  Very high:       >189 mg/dl      Non HDL Cholesterol 204 (H) <130 mg/dL      Comment:      Above Desirable:  130-159 mg/dl  Borderline high:  160-189 mg/dl  High:             190-219 mg/dl  Very high:       >219 mg/dl         If you have any questions or concerns, please call the clinic at the number listed above.       Sincerely,      Juliette Villalobos, RITIKA CNP/dw

## 2021-04-13 NOTE — PROGRESS NOTES
Assessment & Plan     (J30.89) Seasonal allergic rhinitis due to other allergic trigger  (primary encounter diagnosis)  Comment:   Plan: loratadine-pseudoePHEDrine (CLARITIN-D 24 HOUR)         MG 24 hr tablet            (I10) Benign essential hypertension  Comment:  Controlled no change in treatment plan  Plan: diltiazem ER (TIAZAC) 360 MG 24 hr ER beaded         capsule, lisinopril (ZESTRIL) 10 MG tablet,         Comprehensive metabolic panel, CBC with         platelets, Lipid panel reflex to direct LDL         Fasting      (M10.9) Gout, unspecified cause, unspecified chronicity, unspecified site  Comment:  Controlled no change in treatment plan  Plan: allopurinol (ZYLOPRIM) 300 MG tablet    (E78.5) Hyperlipidemia LDL goal <130  Comment:  Controlled no change in treatment plan  Plan: atorvastatin (LIPITOR) 10 MG tablet          [id number]}     BMI:   Estimated body mass index is 32.01 kg/m  as calculated from the following:    Height as of this encounter: 1.829 m (6').    Weight as of this encounter: 107 kg (236 lb).   Weight management plan: Discussed healthy diet and exercise guidelines    See Patient Instructions    No follow-ups on file.    RITIKA Mendoza CNP  [facility]    Michael Tamayo is a 55 year old who presents for the following health issues     HPI     Hypertension Follow-up      Do you check your blood pressure regularly outside of the clinic? No     Are you following a low salt diet? No    Are your blood pressures ever more than 140 on the top number (systolic) OR more   than 90 on the bottom number (diastolic), for example 140/90? No      Review of Systems   Constitutional, HEENT, cardiovascular, pulmonary, GI, , musculoskeletal, neuro, skin, endocrine and psych systems are negative, except as otherwise noted.      Objective    /80 (BP Location: Right arm, Cuff Size: Adult Large)   Pulse 96   Temp 98  F (36.7  C) (Tympanic)   Ht 1.829 m (6')   Wt  107 kg (236 lb)   BMI 32.01 kg/m    There is no height or weight on file to calculate BMI.  Physical Exam   GENERAL: healthy, alert and no distress  EYES: Eyes grossly normal to inspection, PERRL and conjunctivae and sclerae normal  HENT: ear canals and TM's normal, nose and mouth without ulcers or lesions  NECK: no adenopathy, no asymmetry, masses, or scars and thyroid normal to palpation  RESP: lungs clear to auscultation - no rales, rhonchi or wheezes  CV: regular rate and rhythm, normal S1 S2, no S3 or S4, no murmur, click or rub, no peripheral edema and peripheral pulses strong  MS: no gross musculoskeletal defects noted, no edema  SKIN: no suspicious lesions or rashes  NEURO: Normal strength and tone, mentation intact and speech normal  PSYCH: mentation appears normal, affect normal/bright    Results for orders placed or performed in visit on 04/13/21   Comprehensive metabolic panel     Status: None   Result Value Ref Range    Sodium 137 133 - 144 mmol/L    Potassium 4.3 3.4 - 5.3 mmol/L    Chloride 105 94 - 109 mmol/L    Carbon Dioxide 28 20 - 32 mmol/L    Anion Gap 4 3 - 14 mmol/L    Glucose 94 70 - 99 mg/dL    Urea Nitrogen 10 7 - 30 mg/dL    Creatinine 0.88 0.66 - 1.25 mg/dL    GFR Estimate >90 >60 mL/min/[1.73_m2]    GFR Estimate If Black >90 >60 mL/min/[1.73_m2]    Calcium 8.9 8.5 - 10.1 mg/dL    Bilirubin Total 0.3 0.2 - 1.3 mg/dL    Albumin 3.8 3.4 - 5.0 g/dL    Protein Total 7.3 6.8 - 8.8 g/dL    Alkaline Phosphatase 75 40 - 150 U/L    ALT 29 0 - 70 U/L    AST 27 0 - 45 U/L   CBC with platelets     Status: Abnormal   Result Value Ref Range    WBC 4.8 4.0 - 11.0 10e9/L    RBC Count 4.30 (L) 4.4 - 5.9 10e12/L    Hemoglobin 14.6 13.3 - 17.7 g/dL    Hematocrit 42.0 40.0 - 53.0 %    MCV 98 78 - 100 fl    MCH 34.0 (H) 26.5 - 33.0 pg    MCHC 34.8 31.5 - 36.5 g/dL    RDW 12.7 10.0 - 15.0 %    Platelet Count 242 150 - 450 10e9/L   Lipid panel reflex to direct LDL Fasting     Status: Abnormal   Result Value  Ref Range    Cholesterol 285 (H) <200 mg/dL    Triglycerides 161 (H) <150 mg/dL    HDL Cholesterol 81 >39 mg/dL    LDL Cholesterol Calculated 172 (H) <100 mg/dL    Non HDL Cholesterol 204 (H) <130 mg/dL

## 2021-04-14 RX ORDER — ATORVASTATIN CALCIUM 10 MG/1
10 TABLET, FILM COATED ORAL DAILY
Qty: 90 TABLET | Refills: 3 | Status: SHIPPED | OUTPATIENT
Start: 2021-04-14 | End: 2022-02-28

## 2021-04-14 NOTE — RESULT ENCOUNTER NOTE
Please Notify Roni  of test results his comprehensive panel is within normal limits indicating good kidney and liver function.  CBC was within normal limits with a good hemoglobin level.    Cholesterol numbers have increased with a total cholesterol at 285 relates that below 200 and his LDL is at 172 relates that below 100 based on these numbers his cardiac risk factor is 8.5 would recommend starting cholesterol medications.  If in agreement with the plan would start him on low-dose Lipitor 10 mg and recheck cholesterol levels in 6 months.    I have sent in a prescription for the Lipitor 10 mg has any further questions and contact me at the clinic    The 10-year ASCVD risk score (Princessderek SQUIRES Jr., et al., 2013) is: 8.5%    Values used to calculate the score:      Age: 55 years      Sex: Male      Is Non- : No      Diabetic: No      Tobacco smoker: No      Systolic Blood Pressure: 136 mmHg      Is BP treated: Yes      HDL Cholesterol: 81 mg/dL      Total Cholesterol: 285 mg/dL      Juliette Villalobos CNP

## 2021-04-23 ENCOUNTER — IMMUNIZATION (OUTPATIENT)
Dept: FAMILY MEDICINE | Facility: CLINIC | Age: 56
End: 2021-04-23
Payer: COMMERCIAL

## 2021-04-23 PROCEDURE — 0011A PR COVID VAC MODERNA 100 MCG/0.5 ML IM: CPT

## 2021-04-23 PROCEDURE — 91301 PR COVID VAC MODERNA 100 MCG/0.5 ML IM: CPT

## 2021-05-21 ENCOUNTER — IMMUNIZATION (OUTPATIENT)
Dept: FAMILY MEDICINE | Facility: CLINIC | Age: 56
End: 2021-05-21
Attending: FAMILY MEDICINE
Payer: COMMERCIAL

## 2021-05-21 PROCEDURE — 91301 PR COVID VAC MODERNA 100 MCG/0.5 ML IM: CPT

## 2021-05-21 PROCEDURE — 0012A PR COVID VAC MODERNA 100 MCG/0.5 ML IM: CPT

## 2021-06-07 DIAGNOSIS — J30.2 SEASONAL ALLERGIC RHINITIS, UNSPECIFIED TRIGGER: ICD-10-CM

## 2021-06-08 RX ORDER — FLUTICASONE PROPIONATE 50 MCG
1-2 SPRAY, SUSPENSION (ML) NASAL DAILY
Qty: 48 G | Refills: 1 | Status: SHIPPED | OUTPATIENT
Start: 2021-06-08 | End: 2021-12-06

## 2021-07-26 DIAGNOSIS — J30.89 SEASONAL ALLERGIC RHINITIS DUE TO OTHER ALLERGIC TRIGGER: ICD-10-CM

## 2021-07-26 RX ORDER — LORATADINE AND PSEUDOEPHEDRINE SULFATE 10; 240 MG/1; MG/1
TABLET, FILM COATED, EXTENDED RELEASE ORAL
Qty: 90 TABLET | Refills: 0 | Status: SHIPPED | OUTPATIENT
Start: 2021-07-26 | End: 2021-10-26

## 2021-10-25 ENCOUNTER — TELEPHONE (OUTPATIENT)
Dept: FAMILY MEDICINE | Facility: CLINIC | Age: 56
End: 2021-10-25

## 2021-10-25 DIAGNOSIS — J30.89 SEASONAL ALLERGIC RHINITIS DUE TO OTHER ALLERGIC TRIGGER: ICD-10-CM

## 2021-10-26 RX ORDER — LORATADINE AND PSEUDOEPHEDRINE SULFATE 10; 240 MG/1; MG/1
TABLET, FILM COATED, EXTENDED RELEASE ORAL
Qty: 90 TABLET | Refills: 0 | Status: SHIPPED | OUTPATIENT
Start: 2021-10-26 | End: 2022-05-25

## 2021-12-06 DIAGNOSIS — J30.2 SEASONAL ALLERGIC RHINITIS, UNSPECIFIED TRIGGER: ICD-10-CM

## 2021-12-06 RX ORDER — FLUTICASONE PROPIONATE 50 MCG
1-2 SPRAY, SUSPENSION (ML) NASAL DAILY
Qty: 48 G | Refills: 1 | Status: SHIPPED | OUTPATIENT
Start: 2021-12-06 | End: 2022-05-25

## 2022-01-28 DIAGNOSIS — J30.2 SEASONAL ALLERGIC RHINITIS, UNSPECIFIED TRIGGER: Primary | ICD-10-CM

## 2022-01-28 RX ORDER — LORATADINE AND PSEUDOEPHEDRINE SULFATE 10; 240 MG/1; MG/1
TABLET, FILM COATED, EXTENDED RELEASE ORAL
Qty: 90 TABLET | Refills: 0 | Status: SHIPPED | OUTPATIENT
Start: 2022-01-28 | End: 2022-04-28

## 2022-01-28 NOTE — TELEPHONE ENCOUNTER
Routing refill request to provider for review/approval because:  Drug not on the [facility] refill protocol     Geoff Rodriguez RN

## 2022-02-26 DIAGNOSIS — E78.5 HYPERLIPIDEMIA LDL GOAL <130: ICD-10-CM

## 2022-02-26 DIAGNOSIS — I10 BENIGN ESSENTIAL HYPERTENSION: ICD-10-CM

## 2022-02-28 RX ORDER — DILTIAZEM HYDROCHLORIDE 360 MG/1
CAPSULE, EXTENDED RELEASE ORAL
Qty: 90 CAPSULE | Refills: 2 | Status: SHIPPED | OUTPATIENT
Start: 2022-02-28 | End: 2022-05-25

## 2022-02-28 RX ORDER — ATORVASTATIN CALCIUM 10 MG/1
10 TABLET, FILM COATED ORAL DAILY
Qty: 90 TABLET | Refills: 2 | Status: SHIPPED | OUTPATIENT
Start: 2022-02-28 | End: 2022-05-25

## 2022-05-25 ENCOUNTER — OFFICE VISIT (OUTPATIENT)
Dept: FAMILY MEDICINE | Facility: CLINIC | Age: 57
End: 2022-05-25
Payer: COMMERCIAL

## 2022-05-25 VITALS
TEMPERATURE: 97.5 F | HEART RATE: 77 BPM | SYSTOLIC BLOOD PRESSURE: 130 MMHG | WEIGHT: 231.6 LBS | RESPIRATION RATE: 22 BRPM | BODY MASS INDEX: 31.37 KG/M2 | OXYGEN SATURATION: 98 % | HEIGHT: 72 IN | DIASTOLIC BLOOD PRESSURE: 88 MMHG

## 2022-05-25 DIAGNOSIS — I10 BENIGN ESSENTIAL HYPERTENSION: Primary | ICD-10-CM

## 2022-05-25 DIAGNOSIS — E78.5 HYPERLIPIDEMIA LDL GOAL <130: ICD-10-CM

## 2022-05-25 DIAGNOSIS — M10.9 GOUT, UNSPECIFIED CAUSE, UNSPECIFIED CHRONICITY, UNSPECIFIED SITE: ICD-10-CM

## 2022-05-25 DIAGNOSIS — Z23 NEED FOR COVID-19 VACCINE: ICD-10-CM

## 2022-05-25 DIAGNOSIS — Z12.11 SCREEN FOR COLON CANCER: ICD-10-CM

## 2022-05-25 DIAGNOSIS — J30.2 SEASONAL ALLERGIC RHINITIS, UNSPECIFIED TRIGGER: ICD-10-CM

## 2022-05-25 LAB
ANION GAP SERPL CALCULATED.3IONS-SCNC: 5 MMOL/L (ref 3–14)
BUN SERPL-MCNC: 10 MG/DL (ref 7–30)
CALCIUM SERPL-MCNC: 9.5 MG/DL (ref 8.5–10.1)
CHLORIDE BLD-SCNC: 103 MMOL/L (ref 94–109)
CHOLEST SERPL-MCNC: 180 MG/DL
CO2 SERPL-SCNC: 28 MMOL/L (ref 20–32)
CREAT SERPL-MCNC: 0.78 MG/DL (ref 0.66–1.25)
ERYTHROCYTE [DISTWIDTH] IN BLOOD BY AUTOMATED COUNT: 12.6 % (ref 10–15)
FASTING STATUS PATIENT QL REPORTED: YES
GFR SERPL CREATININE-BSD FRML MDRD: >90 ML/MIN/1.73M2
GLUCOSE BLD-MCNC: 102 MG/DL (ref 70–99)
HCT VFR BLD AUTO: 41.3 % (ref 40–53)
HDLC SERPL-MCNC: 97 MG/DL
HGB BLD-MCNC: 13.7 G/DL (ref 13.3–17.7)
LDLC SERPL CALC-MCNC: 71 MG/DL
MCH RBC QN AUTO: 33 PG (ref 26.5–33)
MCHC RBC AUTO-ENTMCNC: 33.2 G/DL (ref 31.5–36.5)
MCV RBC AUTO: 100 FL (ref 78–100)
NONHDLC SERPL-MCNC: 83 MG/DL
PLATELET # BLD AUTO: 216 10E3/UL (ref 150–450)
POTASSIUM BLD-SCNC: 4 MMOL/L (ref 3.4–5.3)
RBC # BLD AUTO: 4.15 10E6/UL (ref 4.4–5.9)
SODIUM SERPL-SCNC: 136 MMOL/L (ref 133–144)
TRIGL SERPL-MCNC: 60 MG/DL
URATE SERPL-MCNC: 3.5 MG/DL (ref 3.5–7.2)
WBC # BLD AUTO: 5 10E3/UL (ref 4–11)

## 2022-05-25 PROCEDURE — 99214 OFFICE O/P EST MOD 30 MIN: CPT | Mod: 25 | Performed by: FAMILY MEDICINE

## 2022-05-25 PROCEDURE — 80061 LIPID PANEL: CPT | Performed by: FAMILY MEDICINE

## 2022-05-25 PROCEDURE — 85027 COMPLETE CBC AUTOMATED: CPT | Performed by: FAMILY MEDICINE

## 2022-05-25 PROCEDURE — 91306 COVID-19,PF,MODERNA (18+ YRS BOOSTER .25ML): CPT | Performed by: FAMILY MEDICINE

## 2022-05-25 PROCEDURE — 80048 BASIC METABOLIC PNL TOTAL CA: CPT | Performed by: FAMILY MEDICINE

## 2022-05-25 PROCEDURE — 36415 COLL VENOUS BLD VENIPUNCTURE: CPT | Performed by: FAMILY MEDICINE

## 2022-05-25 PROCEDURE — 84550 ASSAY OF BLOOD/URIC ACID: CPT | Performed by: FAMILY MEDICINE

## 2022-05-25 PROCEDURE — 0064A COVID-19,PF,MODERNA (18+ YRS BOOSTER .25ML): CPT | Performed by: FAMILY MEDICINE

## 2022-05-25 RX ORDER — ALLOPURINOL 300 MG/1
1 TABLET ORAL DAILY
Qty: 90 TABLET | Refills: 3 | Status: SHIPPED | OUTPATIENT
Start: 2022-05-25 | End: 2023-06-06

## 2022-05-25 RX ORDER — LISINOPRIL 10 MG/1
10 TABLET ORAL DAILY
Qty: 90 TABLET | Refills: 3 | Status: SHIPPED | OUTPATIENT
Start: 2022-05-25 | End: 2023-06-06

## 2022-05-25 RX ORDER — ATORVASTATIN CALCIUM 10 MG/1
10 TABLET, FILM COATED ORAL DAILY
Qty: 90 TABLET | Refills: 2 | Status: SHIPPED | OUTPATIENT
Start: 2022-05-25 | End: 2023-02-07

## 2022-05-25 RX ORDER — DILTIAZEM HYDROCHLORIDE 360 MG/1
CAPSULE, EXTENDED RELEASE ORAL
Qty: 90 CAPSULE | Refills: 1 | Status: SHIPPED | OUTPATIENT
Start: 2022-05-25 | End: 2023-06-06

## 2022-05-25 RX ORDER — LORATADINE AND PSEUDOEPHEDRINE SULFATE 10; 240 MG/1; MG/1
1 TABLET, FILM COATED, EXTENDED RELEASE ORAL DAILY
Qty: 30 TABLET | Refills: 4 | Status: SHIPPED | OUTPATIENT
Start: 2022-05-25 | End: 2022-12-29

## 2022-05-25 RX ORDER — FLUTICASONE PROPIONATE 50 MCG
1-2 SPRAY, SUSPENSION (ML) NASAL DAILY
Qty: 48 G | Refills: 1 | Status: SHIPPED | OUTPATIENT
Start: 2022-05-25 | End: 2023-01-03

## 2022-05-25 NOTE — LETTER
May 25, 2022      Roni Martin  [address]  [place] [address]        Dear ,    We are writing to inform you of your test results.    Fasting blood glucose 102, slightly above target goal of less than 99 and consistent with borderline diabetes.  Other lab results came back unremarkable.       Will recommend to continue regular exercise, healthy diet, current medications and weight loss.  Follow-up in 6 months or earlier if needed.       Resulted Orders   Lipid panel   Result Value Ref Range    Cholesterol 180 <200 mg/dL    Triglycerides 60 <150 mg/dL    Direct Measure HDL 97 >=40 mg/dL    LDL Cholesterol Calculated 71 <=100 mg/dL    Non HDL Cholesterol 83 <130 mg/dL    Patient Fasting > 8hrs? Yes     Narrative    Cholesterol  Desirable:  <200 mg/dL    Triglycerides  Normal:  Less than 150 mg/dL  Borderline High:  150-199 mg/dL  High:  200-499 mg/dL  Very High:  Greater than or equal to 500 mg/dL    Direct Measure HDL  Female:  Greater than or equal to 50 mg/dL   Male:  Greater than or equal to 40 mg/dL    LDL Cholesterol  Desirable:  <100mg/dL  Above Desirable:  100-129 mg/dL   Borderline High:  130-159 mg/dL   High:  160-189 mg/dL   Very High:  >= 190 mg/dL    Non HDL Cholesterol  Desirable:  130 mg/dL  Above Desirable:  130-159 mg/dL  Borderline High:  160-189 mg/dL  High:  190-219 mg/dL  Very High:  Greater than or equal to 220 mg/dL   Basic metabolic panel  (Ca, Cl, CO2, Creat, Gluc, K, Na, BUN)   Result Value Ref Range    Sodium 136 133 - 144 mmol/L    Potassium 4.0 3.4 - 5.3 mmol/L    Chloride 103 94 - 109 mmol/L    Carbon Dioxide (CO2) 28 20 - 32 mmol/L    Anion Gap 5 3 - 14 mmol/L    Urea Nitrogen 10 7 - 30 mg/dL    Creatinine 0.78 0.66 - 1.25 mg/dL    Calcium 9.5 8.5 - 10.1 mg/dL    Glucose 102 (H) 70 - 99 mg/dL    GFR Estimate >90 >60 mL/min/1.73m2      Comment:      Effective December 21, 2021 eGFRcr in adults is calculated using the 2021 CKD-EPI creatinine equation which includes age and  gender (Andrae thomas al., NEJ, DOI: 10.1056/UCTZpb2653936)   CBC with platelets   Result Value Ref Range    WBC Count 5.0 4.0 - 11.0 10e3/uL    RBC Count 4.15 (L) 4.40 - 5.90 10e6/uL    Hemoglobin 13.7 13.3 - 17.7 g/dL    Hematocrit 41.3 40.0 - 53.0 %     78 - 100 fL    MCH 33.0 26.5 - 33.0 pg    MCHC 33.2 31.5 - 36.5 g/dL    RDW 12.6 10.0 - 15.0 %    Platelet Count 216 150 - 450 10e3/uL   Uric acid   Result Value Ref Range    Uric Acid 3.5 3.5 - 7.2 mg/dL       If you have any questions or concerns, please call the clinic at the number listed above.       Sincerely,      Vasyl Lieberman MD

## 2022-05-25 NOTE — PROGRESS NOTES
Assessment & Plan     Benign essential hypertension  Blood pressure within target goal of less than 140/90.  Lisinopril and diltiazem refilled. Healthy lifestyle modifications stressed including regular exercise, balanced diet, weight loss and limiting salt/caffeine/pop intake  - diltiazem ER (TIADYLT ER) 360 MG 24 hr ER beaded capsule; TAKE 1 CAPSULE BY MOUTH ONCE DAILY  - lisinopril (ZESTRIL) 10 MG tablet; Take 1 tablet (10 mg) by mouth daily  - CBC with platelets; Future  - Basic metabolic panel  (Ca, Cl, CO2, Creat, Gluc, K, Na, BUN); Future  - Lipid panel; Future  - Lipid panel  - Basic metabolic panel  (Ca, Cl, CO2, Creat, Gluc, K, Na, BUN)  - CBC with platelets    Screen for colon cancer  - LITZY([employer])    Need for COVID-19 vaccine  - COVID-19,PF,MODERNA (18+ YRS BOOSTER .25ML)    Seasonal allergic rhinitis, unspecified trigger  Claritin-D and Flonase refilled.  Recommended to follow-up with allergist as well  - loratadine-pseudoePHEDrine (ALLERGY RELIEF/NASAL DECONGEST)  MG 24 hr tablet; Take 1 tablet by mouth daily  - fluticasone (FLONASE) 50 MCG/ACT nasal spray; Spray 1-2 sprays into both nostrils daily  - Adult Allergy/Asthma Referral; Future    Hyperlipidemia LDL goal <130  Lipitor refilled  - atorvastatin (LIPITOR) 10 MG tablet; Take 1 tablet (10 mg) by mouth daily    Gout, unspecified cause, unspecified chronicity, unspecified site  Continue allopurinol  - allopurinol (ZYLOPRIM) 300 MG tablet; Take 1 tablet (300 mg) by mouth daily      BMI:   Estimated body mass index is 31.41 kg/m  as calculated from the following:    Height as of this encounter: 1.829 m (6').    Weight as of this encounter: 105.1 kg (231 lb 9.6 oz).   Weight management plan: Discussed healthy diet and exercise guidelines      Vasyl Lieberman MD  [facility]    Michael Tamayo is a 56 year old who presents for the following health issues     History of Present Illness       Reason for  visit:  Check up    He eats 0-1 servings of fruits and vegetables daily.He consumes 1 sweetened beverage(s) daily.He exercises with enough effort to increase his heart rate 10 to 19 minutes per day.  He exercises with enough effort to increase his heart rate 7 days per week.   He is taking medications regularly.       Hypertension Follow-up      Do you check your blood pressure regularly outside of the clinic? No     Are you following a low salt diet? No    Are your blood pressures ever more than 140 on the top number (systolic) OR more   than 90 on the bottom number (diastolic), for example 140/90? n/a    Known to have seasonal allergies, would like med refills.  Due for colon cancer screening and routine labs as well.       Review of Systems   Constitutional, HEENT, cardiovascular, pulmonary, GI, , musculoskeletal, neuro, skin, endocrine and psych systems are negative, except as otherwise noted.      Objective    /88 (BP Location: Right arm, Patient Position: Sitting, Cuff Size: Adult Large)   Pulse 77   Temp 97.5  F (36.4  C) (Tympanic)   Resp 22   Ht 1.829 m (6')   Wt 105.1 kg (231 lb 9.6 oz)   SpO2 98%   BMI 31.41 kg/m    Body mass index is 31.41 kg/m .  Physical Exam   GENERAL: alert, no distress and obese  EYES: Eyes grossly normal to inspection, PERRL and conjunctivae and sclerae normal  HENT: normal cephalic/atraumatic, ear canals and TM's normal, nasal mucosa edematous , oral mucous membranes moist and sinuses: not tender  NECK: no adenopathy, no asymmetry, masses, or scars and thyroid normal to palpation  RESP: lungs clear to auscultation - no rales, rhonchi or wheezes  CV: regular rate and rhythm, normal S1 S2, no S3 or S4, no murmur, click or rub  ABDOMEN: soft, nontender, no hepatosplenomegaly  MS: no gross musculoskeletal defects noted, no edema  SKIN: no suspicious lesions or rashes  NEURO: Normal strength and tone, mentation intact and speech normal  PSYCH: mentation appears normal,  affect normal/bright

## 2022-06-16 DIAGNOSIS — R19.5 POSITIVE COLORECTAL CANCER SCREENING USING COLOGUARD TEST: Primary | ICD-10-CM

## 2022-06-16 LAB — NONINV COLON CA DNA+OCC BLD SCRN STL QL: POSITIVE

## 2022-06-16 NOTE — RESULT ENCOUNTER NOTE
Please Notify Roni  of test results thyroid test was positive will need to follow-up with a Colonoscopy.  I have placed that referral they will contact you to schedule your colonoscopy    Juliette Villalobos CNP

## 2022-06-22 ENCOUNTER — ANCILLARY PROCEDURE (OUTPATIENT)
Dept: GENERAL RADIOLOGY | Facility: CLINIC | Age: 57
End: 2022-06-22
Attending: NURSE PRACTITIONER
Payer: COMMERCIAL

## 2022-06-22 ENCOUNTER — OFFICE VISIT (OUTPATIENT)
Dept: FAMILY MEDICINE | Facility: CLINIC | Age: 57
End: 2022-06-22
Payer: COMMERCIAL

## 2022-06-22 VITALS
SYSTOLIC BLOOD PRESSURE: 138 MMHG | DIASTOLIC BLOOD PRESSURE: 80 MMHG | HEART RATE: 83 BPM | WEIGHT: 225 LBS | OXYGEN SATURATION: 99 % | BODY MASS INDEX: 30.52 KG/M2 | RESPIRATION RATE: 14 BRPM | TEMPERATURE: 98.2 F

## 2022-06-22 DIAGNOSIS — G89.29 CHRONIC MIDLINE LOW BACK PAIN WITHOUT SCIATICA: Primary | ICD-10-CM

## 2022-06-22 DIAGNOSIS — R10.2 CHRONIC SUPRAPUBIC PAIN: ICD-10-CM

## 2022-06-22 DIAGNOSIS — M54.50 CHRONIC MIDLINE LOW BACK PAIN WITHOUT SCIATICA: Primary | ICD-10-CM

## 2022-06-22 DIAGNOSIS — F41.8 SITUATIONAL ANXIETY: ICD-10-CM

## 2022-06-22 DIAGNOSIS — G89.29 CHRONIC SUPRAPUBIC PAIN: ICD-10-CM

## 2022-06-22 DIAGNOSIS — K52.9 CHRONIC DIARRHEA: ICD-10-CM

## 2022-06-22 DIAGNOSIS — M54.50 CHRONIC MIDLINE LOW BACK PAIN WITHOUT SCIATICA: ICD-10-CM

## 2022-06-22 DIAGNOSIS — G89.29 CHRONIC MIDLINE LOW BACK PAIN WITHOUT SCIATICA: ICD-10-CM

## 2022-06-22 LAB
ALBUMIN UR-MCNC: NEGATIVE MG/DL
APPEARANCE UR: CLEAR
BILIRUB UR QL STRIP: NEGATIVE
COLOR UR AUTO: YELLOW
GLUCOSE UR STRIP-MCNC: NEGATIVE MG/DL
HGB UR QL STRIP: NEGATIVE
KETONES UR STRIP-MCNC: NEGATIVE MG/DL
LEUKOCYTE ESTERASE UR QL STRIP: NEGATIVE
NITRATE UR QL: NEGATIVE
PH UR STRIP: 5.5 [PH] (ref 5–7)
SP GR UR STRIP: 1.02 (ref 1–1.03)
UROBILINOGEN UR STRIP-ACNC: 0.2 E.U./DL

## 2022-06-22 PROCEDURE — 72100 X-RAY EXAM L-S SPINE 2/3 VWS: CPT | Mod: TC | Performed by: RADIOLOGY

## 2022-06-22 PROCEDURE — 99214 OFFICE O/P EST MOD 30 MIN: CPT | Performed by: NURSE PRACTITIONER

## 2022-06-22 PROCEDURE — 81003 URINALYSIS AUTO W/O SCOPE: CPT | Performed by: NURSE PRACTITIONER

## 2022-06-22 RX ORDER — ESCITALOPRAM OXALATE 10 MG/1
10 TABLET ORAL DAILY
Qty: 30 TABLET | Refills: 5 | Status: SHIPPED | OUTPATIENT
Start: 2022-06-22 | End: 2023-02-07 | Stop reason: SINTOL

## 2022-06-22 ASSESSMENT — PAIN SCALES - GENERAL: PAINLEVEL: MILD PAIN (3)

## 2022-06-22 NOTE — H&P (VIEW-ONLY)
Assessment & Plan     Acute on chronic midline low back pain without sciatica  Urinalysis today   - XR Lumbar Spine 2/3 Views  - tiZANidine (ZANAFLEX) 4 MG tablet  Dispense: 30 tablet; Refill: 0    Chronic diarrhea  Positive Cologuard  Colonoscopy upcoming  - CT Abdomen Pelvis w Contrast    Chronic suprapubic pain    - tiZANidine (ZANAFLEX) 4 MG tablet  Dispense: 30 tablet; Refill: 0  - CT Abdomen Pelvis w Contrast    Situational anxiety  Recent exacerbation.  Begin medication  - escitalopram (LEXAPRO) 10 MG tablet  Dispense: 30 tablet; Refill: 5  Consider psychotherapy      Follow-up 1 month with primary care provider  Call or return to the clinic with any worsening of symptoms or no resolution. Patient/Parent verbalized understanding and is in agreement. Medication side effects reviewed.   Current Outpatient Medications   Medication Sig Dispense Refill     allopurinol (ZYLOPRIM) 300 MG tablet Take 1 tablet (300 mg) by mouth daily 90 tablet 3     atorvastatin (LIPITOR) 10 MG tablet Take 1 tablet (10 mg) by mouth daily 90 tablet 2     diltiazem ER (TIADYLT ER) 360 MG 24 hr ER beaded capsule TAKE 1 CAPSULE BY MOUTH ONCE DAILY 90 capsule 1     escitalopram (LEXAPRO) 10 MG tablet Take 1 tablet (10 mg) by mouth daily 30 tablet 5     fluticasone (FLONASE) 50 MCG/ACT nasal spray Spray 1-2 sprays into both nostrils daily 48 g 1     ibuprofen (ADVIL/MOTRIN) 600 MG tablet Take 1 tablet (600 mg) by mouth every 8 hours as needed for moderate pain 60 tablet 0     lisinopril (ZESTRIL) 10 MG tablet Take 1 tablet (10 mg) by mouth daily 90 tablet 3     loratadine-pseudoePHEDrine (ALLERGY RELIEF/NASAL DECONGEST)  MG 24 hr tablet Take 1 tablet by mouth daily 30 tablet 4     sildenafil (VIAGRA) 25 MG tablet Take 1 tablet (25 mg) by mouth daily as needed (ED) Oral: 50 mg once daily as needed 1 hour before sexual activity; may be taken up to 4 hours before sexual activity. Reduce to 25 mg once daily if side effects occur. May  increase to a maximum dose of 100 mg once daily if there is incomplete response. 9 tablet 1     tiZANidine (ZANAFLEX) 4 MG tablet Take 1 tablet (4 mg) by mouth 3 times daily as needed for muscle spasms 30 tablet 0     Chart documentation with Dragon Voice recognition Software. Although reviewed after completion, some words and grammatical errors may remain.    RITIKA Burden CNP  [facility]    Michael Tamayo is a 56 year old, presenting for the following health issues:  Back Pain (Urinary pain )    After urination pain gets better   Ongoing for 1 year   Pain radiates from back around to the suprapubic/pelvic area   Chronic ongoing diarrhea    History of Present Illness       Back Pain:  He presents for follow up of back pain. Patient's back pain is a new problem.    Original cause of back pain: not sure  First noticed back pain: more than 1 month ago  Patient feels back pain: constantlyLocation of back pain:  Other  Description of back pain: dull ache  Back pain spreads: nowhere    Since patient first noticed back pain, pain is: always present, but gets better and worse  Does back pain interfere with his job:  Not applicable  On a scale of 1-10 (10 being the worst), patient describes pain as:  2  What makes back pain worse: certain positions  Acupuncture: not tried  Acetaminophen: not tried  Activity or exercise: not helpful  Chiropractor:  Not tried  Cold: not tried  Heat: not tried  Massage: not tried  Muscle relaxants: not tried  NSAIDS: helpful  Opioids: not tried  Physical Therapy: not tried  Rest: not helpful  Steroid Injection: not tried  Stretching: not helpful  Surgery: not tried  TENS unit: not tried  Topical pain relievers: not tried  Other healthcare providers patient is seeing for back pain: None    He eats 0-1 servings of fruits and vegetables daily.He consumes 1 sweetened beverage(s) daily.He exercises with enough effort to increase his heart rate 20 to 29  minutes per day.  He exercises with enough effort to increase his heart rate 6 days per week.   He is taking medications regularly.     Results for orders placed or performed in visit on 06/22/22   UA Macro with Reflex to Micro and Culture - lab collect     Status: Normal    Specimen: Urine, Midstream   Result Value Ref Range    Color Urine Yellow Colorless, Straw, Light Yellow, Yellow    Appearance Urine Clear Clear    Glucose Urine Negative Negative mg/dL    Bilirubin Urine Negative Negative    Ketones Urine Negative Negative mg/dL    Specific Gravity Urine 1.020 1.003 - 1.035    Blood Urine Negative Negative    pH Urine 5.5 5.0 - 7.0    Protein Albumin Urine Negative Negative mg/dL    Urobilinogen Urine 0.2 0.2, 1.0 E.U./dL    Nitrite Urine Negative Negative    Leukocyte Esterase Urine Negative Negative    Narrative    Microscopic not indicated     Low back pain x 2 months worse  Constant aching dull,   Pain comes around from the bladder to the hip area.  Bladder burning sensation when bladder is full  Urination helps the symptoms.  No blood in the urine  Urine clear today  Colonoscopy July 18th + Cologuard  No problems with urinary stream. Less urine more frequency  No history of kidney stones  Sometimes changing position makes the back pain some better so able to get to sleep    Wt Readings from Last 5 Encounters:   06/22/22 102.1 kg (225 lb)   05/25/22 105.1 kg (231 lb 9.6 oz)   04/13/21 107 kg (236 lb)   03/17/20 109.8 kg (242 lb)   12/20/19 106.6 kg (235 lb)     Wife has been sick and he is her caregiver for the past two years.  Night sweats for the past months.   Weight loss 11 lbs this year. No appetite          Review of Systems   Constitutional, HEENT, cardiovascular, pulmonary, GI, , musculoskeletal, neuro, skin, endocrine and psych systems are negative, except as otherwise noted.      Objective    /80   Pulse 83   Temp 98.2  F (36.8  C) (Tympanic)   Resp 14   Wt 102.1 kg (225 lb)   SpO2  99%   BMI 30.52 kg/m    Body mass index is 30.52 kg/m .  Physical Exam   GENERAL: healthy, alert and no distress  EYES: Eyes grossly normal to inspection, PERRL and conjunctivae and sclerae normal  HENT: ear canals and TM's normal, nose and mouth without ulcers or lesions  NECK: no adenopathy, no asymmetry, masses, or scars and thyroid normal to palpation  RESP: lungs clear to auscultation - no rales, rhonchi or wheezes  CV: regular rate and rhythm, normal S1 S2, no S3 or S4, no murmur, click or rub, no peripheral edema and peripheral pulses strong  ABDOMEN: soft, nontender, no hepatosplenomegaly, no masses and bowel sounds normal  MS: no gross musculoskeletal defects noted, no edema  SKIN: no suspicious lesions or rashes  NEURO: Normal strength and tone, mentation intact and speech normal  PSYCH: affect flat, anxious, judgement and insight intact and appearance well groomed    Results for orders placed or performed in visit on 06/22/22   XR Lumbar Spine 2/3 Views     Status: None    Narrative    LUMBAR SPINE TWO TO THREE VIEWS  6/22/2022 10:13 AM     HISTORY:  Low back pain, ++ Cologuard. Chronic midline low back pain  without sciatica.    COMPARISON: None.       Impression    IMPRESSION: Alignment of the lumbar spine is within normal limits.  Age-indeterminate mild loss of the T12 and L1 vertebral body height.  Mild degenerative endplate changes and loss of disc height in the  lumbar spine.    ROMA TREJO MD         SYSTEM ID:  [id number]   Results for orders placed or performed in visit on 06/22/22   UA Macro with Reflex to Micro and Culture - lab collect     Status: Normal    Specimen: Urine, Midstream   Result Value Ref Range    Color Urine Yellow Colorless, Straw, Light Yellow, Yellow    Appearance Urine Clear Clear    Glucose Urine Negative Negative mg/dL    Bilirubin Urine Negative Negative    Ketones Urine Negative Negative mg/dL    Specific Gravity Urine 1.020 1.003 - 1.035    Blood Urine Negative  Negative    pH Urine 5.5 5.0 - 7.0    Protein Albumin Urine Negative Negative mg/dL    Urobilinogen Urine 0.2 0.2, 1.0 E.U./dL    Nitrite Urine Negative Negative    Leukocyte Esterase Urine Negative Negative    Narrative    Microscopic not indicated                   .  ..

## 2022-06-22 NOTE — PROGRESS NOTES
Assessment & Plan     Acute on chronic midline low back pain without sciatica  Urinalysis today   - XR Lumbar Spine 2/3 Views  - tiZANidine (ZANAFLEX) 4 MG tablet  Dispense: 30 tablet; Refill: 0    Chronic diarrhea  Positive Cologuard  Colonoscopy upcoming  - CT Abdomen Pelvis w Contrast    Chronic suprapubic pain    - tiZANidine (ZANAFLEX) 4 MG tablet  Dispense: 30 tablet; Refill: 0  - CT Abdomen Pelvis w Contrast    Situational anxiety  Recent exacerbation.  Begin medication  - escitalopram (LEXAPRO) 10 MG tablet  Dispense: 30 tablet; Refill: 5  Consider psychotherapy      Follow-up 1 month with primary care provider  Call or return to the clinic with any worsening of symptoms or no resolution. Patient/Parent verbalized understanding and is in agreement. Medication side effects reviewed.   Current Outpatient Medications   Medication Sig Dispense Refill     allopurinol (ZYLOPRIM) 300 MG tablet Take 1 tablet (300 mg) by mouth daily 90 tablet 3     atorvastatin (LIPITOR) 10 MG tablet Take 1 tablet (10 mg) by mouth daily 90 tablet 2     diltiazem ER (TIADYLT ER) 360 MG 24 hr ER beaded capsule TAKE 1 CAPSULE BY MOUTH ONCE DAILY 90 capsule 1     escitalopram (LEXAPRO) 10 MG tablet Take 1 tablet (10 mg) by mouth daily 30 tablet 5     fluticasone (FLONASE) 50 MCG/ACT nasal spray Spray 1-2 sprays into both nostrils daily 48 g 1     ibuprofen (ADVIL/MOTRIN) 600 MG tablet Take 1 tablet (600 mg) by mouth every 8 hours as needed for moderate pain 60 tablet 0     lisinopril (ZESTRIL) 10 MG tablet Take 1 tablet (10 mg) by mouth daily 90 tablet 3     loratadine-pseudoePHEDrine (ALLERGY RELIEF/NASAL DECONGEST)  MG 24 hr tablet Take 1 tablet by mouth daily 30 tablet 4     sildenafil (VIAGRA) 25 MG tablet Take 1 tablet (25 mg) by mouth daily as needed (ED) Oral: 50 mg once daily as needed 1 hour before sexual activity; may be taken up to 4 hours before sexual activity. Reduce to 25 mg once daily if side effects occur. May  increase to a maximum dose of 100 mg once daily if there is incomplete response. 9 tablet 1     tiZANidine (ZANAFLEX) 4 MG tablet Take 1 tablet (4 mg) by mouth 3 times daily as needed for muscle spasms 30 tablet 0     Chart documentation with Dragon Voice recognition Software. Although reviewed after completion, some words and grammatical errors may remain.    RITIKA Burden CNP  [facility]    Michael Tamayo is a 56 year old, presenting for the following health issues:  Back Pain (Urinary pain )    After urination pain gets better   Ongoing for 1 year   Pain radiates from back around to the suprapubic/pelvic area   Chronic ongoing diarrhea    History of Present Illness       Back Pain:  He presents for follow up of back pain. Patient's back pain is a new problem.    Original cause of back pain: not sure  First noticed back pain: more than 1 month ago  Patient feels back pain: constantlyLocation of back pain:  Other  Description of back pain: dull ache  Back pain spreads: nowhere    Since patient first noticed back pain, pain is: always present, but gets better and worse  Does back pain interfere with his job:  Not applicable  On a scale of 1-10 (10 being the worst), patient describes pain as:  2  What makes back pain worse: certain positions  Acupuncture: not tried  Acetaminophen: not tried  Activity or exercise: not helpful  Chiropractor:  Not tried  Cold: not tried  Heat: not tried  Massage: not tried  Muscle relaxants: not tried  NSAIDS: helpful  Opioids: not tried  Physical Therapy: not tried  Rest: not helpful  Steroid Injection: not tried  Stretching: not helpful  Surgery: not tried  TENS unit: not tried  Topical pain relievers: not tried  Other healthcare providers patient is seeing for back pain: None    He eats 0-1 servings of fruits and vegetables daily.He consumes 1 sweetened beverage(s) daily.He exercises with enough effort to increase his heart rate 20 to 29  minutes per day.  He exercises with enough effort to increase his heart rate 6 days per week.   He is taking medications regularly.     Results for orders placed or performed in visit on 06/22/22   UA Macro with Reflex to Micro and Culture - lab collect     Status: Normal    Specimen: Urine, Midstream   Result Value Ref Range    Color Urine Yellow Colorless, Straw, Light Yellow, Yellow    Appearance Urine Clear Clear    Glucose Urine Negative Negative mg/dL    Bilirubin Urine Negative Negative    Ketones Urine Negative Negative mg/dL    Specific Gravity Urine 1.020 1.003 - 1.035    Blood Urine Negative Negative    pH Urine 5.5 5.0 - 7.0    Protein Albumin Urine Negative Negative mg/dL    Urobilinogen Urine 0.2 0.2, 1.0 E.U./dL    Nitrite Urine Negative Negative    Leukocyte Esterase Urine Negative Negative    Narrative    Microscopic not indicated     Low back pain x 2 months worse  Constant aching dull,   Pain comes around from the bladder to the hip area.  Bladder burning sensation when bladder is full  Urination helps the symptoms.  No blood in the urine  Urine clear today  Colonoscopy July 18th + Cologuard  No problems with urinary stream. Less urine more frequency  No history of kidney stones  Sometimes changing position makes the back pain some better so able to get to sleep    Wt Readings from Last 5 Encounters:   06/22/22 102.1 kg (225 lb)   05/25/22 105.1 kg (231 lb 9.6 oz)   04/13/21 107 kg (236 lb)   03/17/20 109.8 kg (242 lb)   12/20/19 106.6 kg (235 lb)     Wife has been sick and he is her caregiver for the past two years.  Night sweats for the past months.   Weight loss 11 lbs this year. No appetite          Review of Systems   Constitutional, HEENT, cardiovascular, pulmonary, GI, , musculoskeletal, neuro, skin, endocrine and psych systems are negative, except as otherwise noted.      Objective    /80   Pulse 83   Temp 98.2  F (36.8  C) (Tympanic)   Resp 14   Wt 102.1 kg (225 lb)   SpO2  99%   BMI 30.52 kg/m    Body mass index is 30.52 kg/m .  Physical Exam   GENERAL: healthy, alert and no distress  EYES: Eyes grossly normal to inspection, PERRL and conjunctivae and sclerae normal  HENT: ear canals and TM's normal, nose and mouth without ulcers or lesions  NECK: no adenopathy, no asymmetry, masses, or scars and thyroid normal to palpation  RESP: lungs clear to auscultation - no rales, rhonchi or wheezes  CV: regular rate and rhythm, normal S1 S2, no S3 or S4, no murmur, click or rub, no peripheral edema and peripheral pulses strong  ABDOMEN: soft, nontender, no hepatosplenomegaly, no masses and bowel sounds normal  MS: no gross musculoskeletal defects noted, no edema  SKIN: no suspicious lesions or rashes  NEURO: Normal strength and tone, mentation intact and speech normal  PSYCH: affect flat, anxious, judgement and insight intact and appearance well groomed    Results for orders placed or performed in visit on 06/22/22   XR Lumbar Spine 2/3 Views     Status: None    Narrative    LUMBAR SPINE TWO TO THREE VIEWS  6/22/2022 10:13 AM     HISTORY:  Low back pain, ++ Cologuard. Chronic midline low back pain  without sciatica.    COMPARISON: None.       Impression    IMPRESSION: Alignment of the lumbar spine is within normal limits.  Age-indeterminate mild loss of the T12 and L1 vertebral body height.  Mild degenerative endplate changes and loss of disc height in the  lumbar spine.    ROMA TREJO MD         SYSTEM ID:  [id number]   Results for orders placed or performed in visit on 06/22/22   UA Macro with Reflex to Micro and Culture - lab collect     Status: Normal    Specimen: Urine, Midstream   Result Value Ref Range    Color Urine Yellow Colorless, Straw, Light Yellow, Yellow    Appearance Urine Clear Clear    Glucose Urine Negative Negative mg/dL    Bilirubin Urine Negative Negative    Ketones Urine Negative Negative mg/dL    Specific Gravity Urine 1.020 1.003 - 1.035    Blood Urine Negative  Negative    pH Urine 5.5 5.0 - 7.0    Protein Albumin Urine Negative Negative mg/dL    Urobilinogen Urine 0.2 0.2, 1.0 E.U./dL    Nitrite Urine Negative Negative    Leukocyte Esterase Urine Negative Negative    Narrative    Microscopic not indicated                   .  ..

## 2022-06-23 ENCOUNTER — HOSPITAL ENCOUNTER (OUTPATIENT)
Dept: CT IMAGING | Facility: CLINIC | Age: 57
Discharge: HOME | End: 2022-06-23
Attending: NURSE PRACTITIONER | Admitting: NURSE PRACTITIONER
Payer: COMMERCIAL

## 2022-06-23 DIAGNOSIS — G89.29 CHRONIC SUPRAPUBIC PAIN: ICD-10-CM

## 2022-06-23 DIAGNOSIS — K52.9 CHRONIC DIARRHEA: ICD-10-CM

## 2022-06-23 DIAGNOSIS — R10.2 CHRONIC SUPRAPUBIC PAIN: ICD-10-CM

## 2022-06-23 PROCEDURE — 74177 CT ABD & PELVIS W/CONTRAST: CPT

## 2022-06-23 PROCEDURE — 250N000009 HC RX 250: Performed by: NURSE PRACTITIONER

## 2022-06-23 PROCEDURE — 250N000011 HC RX IP 250 OP 636: Performed by: NURSE PRACTITIONER

## 2022-06-23 RX ORDER — IOPAMIDOL 755 MG/ML
90 INJECTION, SOLUTION INTRAVASCULAR ONCE
Status: COMPLETED | OUTPATIENT
Start: 2022-06-23 | End: 2022-06-23

## 2022-06-23 RX ADMIN — IOPAMIDOL 90 ML: 755 INJECTION, SOLUTION INTRAVENOUS at 13:51

## 2022-06-23 RX ADMIN — SODIUM CHLORIDE 68 ML: 9 INJECTION, SOLUTION INTRAVENOUS at 13:52

## 2022-07-07 ENCOUNTER — TELEPHONE (OUTPATIENT)
Dept: FAMILY MEDICINE | Facility: CLINIC | Age: 57
End: 2022-07-07

## 2022-07-07 NOTE — TELEPHONE ENCOUNTER
Called and gave wife Kate the phone number to gastroenterology where they scheduled the apt. To have them send the paperwork. They will call and have them send the paperwork out.     Emeli Brantley RN

## 2022-07-07 NOTE — TELEPHONE ENCOUNTER
Reason for Call:  Other    Detailed comments: Pt's wife calling because pt is scheduled for a colonoscopy but they did not receive any pre-procedure paperwork for what they should do prior to pt's surgery. They would like this paperwork mailed to them if possible.    Phone Number Patient can be reached at: Home number on file [phone] (home)    Best Time: anytime    Can we leave a detailed message on this number? YES    Call taken on 7/7/2022 at 4:52 PM by Janny Mccoy

## 2022-07-07 NOTE — TELEPHONE ENCOUNTER
Attempted to call gastroenterology to see if they can send pre-procedure paperwork. They were closed.     Emeli Brantley RN

## 2022-07-15 ENCOUNTER — ANESTHESIA EVENT (OUTPATIENT)
Dept: GASTROENTEROLOGY | Facility: CLINIC | Age: 57
End: 2022-07-15
Payer: COMMERCIAL

## 2022-07-15 NOTE — ANESTHESIA PREPROCEDURE EVALUATION
Anesthesia Pre-Procedure Evaluation    Patient: Roni Martin   MRN: [id number] : 1965        Procedure : Procedure(s):  COLONOSCOPY          History reviewed. No pertinent past medical history.   Past Surgical History:   Procedure Laterality Date     IR THORACENTESIS  2019     THORACENTESIS Left 2019    Procedure: THORACENTESIS;  Surgeon: Gal Will PA-C;  Location: [facility] OR      No Known Allergies   Social History     Tobacco Use     Smoking status: Never Smoker     Smokeless tobacco: Never Used   Substance Use Topics     Alcohol use: Yes     Comment: 3 times per week      Wt Readings from Last 1 Encounters:   22 102.1 kg (225 lb)        Anesthesia Evaluation   Pt has had prior anesthetic.     No history of anesthetic complications       ROS/MED HX  ENT/Pulmonary:  - neg pulmonary ROS     Neurologic:  - neg neurologic ROS     Cardiovascular:     (+) Dyslipidemia hypertension-----    METS/Exercise Tolerance:     Hematologic:  - neg hematologic  ROS     Musculoskeletal:  - neg musculoskeletal ROS     GI/Hepatic:  - neg GI/hepatic ROS     Renal/Genitourinary:  - neg Renal ROS     Endo:     (+) Obesity,     Psychiatric/Substance Use:  - neg psychiatric ROS     Infectious Disease:  - neg infectious disease ROS     Malignancy:  - neg malignancy ROS     Other:  - neg other ROS          Physical Exam    Airway        Mallampati: I   TM distance: > 3 FB   Neck ROM: full   Mouth opening: > 3 cm    Respiratory Devices and Support         Dental  no notable dental history         Cardiovascular   cardiovascular exam normal          Pulmonary   pulmonary exam normal                OUTSIDE LABS:  CBC:   Lab Results   Component Value Date    WBC 5.0 2022    WBC 4.8 2021    HGB 13.7 2022    HGB 14.6 2021    HCT 41.3 2022    HCT 42.0 2021     2022     2021     BMP:   Lab Results   Component Value Date     2022      04/13/2021    POTASSIUM 4.0 05/25/2022    POTASSIUM 4.3 04/13/2021    CHLORIDE 103 05/25/2022    CHLORIDE 105 04/13/2021    CO2 28 05/25/2022    CO2 28 04/13/2021    BUN 10 05/25/2022    BUN 10 04/13/2021    CR 0.78 05/25/2022    CR 0.88 04/13/2021     (H) 05/25/2022    GLC 94 04/13/2021     COAGS:   Lab Results   Component Value Date    PTT 28 01/14/2004    INR 1.04 11/25/2019     POC: No results found for: BGM, HCG, HCGS  HEPATIC:   Lab Results   Component Value Date    ALBUMIN 3.8 04/13/2021    PROTTOTAL 7.3 04/13/2021    ALT 29 04/13/2021    AST 27 04/13/2021    ALKPHOS 75 04/13/2021    BILITOTAL 0.3 04/13/2021     OTHER:   Lab Results   Component Value Date    A1C 5.3 03/29/2019    AGATHA 9.5 05/25/2022    TSH 1.81 03/17/2020       Anesthesia Plan    ASA Status:  3      Anesthesia Type: General.              Consents    Anesthesia Plan(s) and associated risks, benefits, and realistic alternatives discussed. Questions answered and patient/representative(s) expressed understanding.     - Discussed: Risks, Benefits and Alternatives for BOTH SEDATION and the PROCEDURE were discussed     - Discussed with:  Patient         Postoperative Care       PONV prophylaxis: Background Propofol Infusion     Comments:                RITIKA Hayes CRNA

## 2022-07-18 ENCOUNTER — ANESTHESIA (OUTPATIENT)
Dept: GASTROENTEROLOGY | Facility: CLINIC | Age: 57
End: 2022-07-18
Payer: COMMERCIAL

## 2022-07-18 ENCOUNTER — HOSPITAL ENCOUNTER (OUTPATIENT)
Facility: CLINIC | Age: 57
Discharge: HOME | End: 2022-07-18
Attending: SURGERY | Admitting: SURGERY
Payer: COMMERCIAL

## 2022-07-18 VITALS
HEIGHT: 72 IN | TEMPERATURE: 98.6 F | DIASTOLIC BLOOD PRESSURE: 88 MMHG | RESPIRATION RATE: 16 BRPM | OXYGEN SATURATION: 94 % | WEIGHT: 225 LBS | SYSTOLIC BLOOD PRESSURE: 135 MMHG | BODY MASS INDEX: 30.48 KG/M2 | HEART RATE: 95 BPM

## 2022-07-18 LAB — COLONOSCOPY: NORMAL

## 2022-07-18 PROCEDURE — 45380 COLONOSCOPY AND BIOPSY: CPT | Performed by: SURGERY

## 2022-07-18 PROCEDURE — 250N000011 HC RX IP 250 OP 636: Performed by: NURSE ANESTHETIST, CERTIFIED REGISTERED

## 2022-07-18 PROCEDURE — 88305 TISSUE EXAM BY PATHOLOGIST: CPT | Mod: TC | Performed by: SURGERY

## 2022-07-18 PROCEDURE — 258N000003 HC RX IP 258 OP 636: Performed by: SURGERY

## 2022-07-18 PROCEDURE — 45378 DIAGNOSTIC COLONOSCOPY: CPT | Performed by: SURGERY

## 2022-07-18 PROCEDURE — 370N000017 HC ANESTHESIA TECHNICAL FEE, PER MIN: Performed by: SURGERY

## 2022-07-18 PROCEDURE — 88305 TISSUE EXAM BY PATHOLOGIST: CPT | Mod: 26 | Performed by: PATHOLOGY

## 2022-07-18 PROCEDURE — 250N000009 HC RX 250: Performed by: SURGERY

## 2022-07-18 RX ORDER — PROPOFOL 10 MG/ML
INJECTION, EMULSION INTRAVENOUS PRN
Status: DISCONTINUED | OUTPATIENT
Start: 2022-07-18 | End: 2022-07-18

## 2022-07-18 RX ORDER — SODIUM CHLORIDE, SODIUM LACTATE, POTASSIUM CHLORIDE, CALCIUM CHLORIDE 600; 310; 30; 20 MG/100ML; MG/100ML; MG/100ML; MG/100ML
INJECTION, SOLUTION INTRAVENOUS CONTINUOUS
Status: DISCONTINUED | OUTPATIENT
Start: 2022-07-18 | End: 2022-07-18 | Stop reason: HOSPADM

## 2022-07-18 RX ORDER — LIDOCAINE 40 MG/G
CREAM TOPICAL
Status: DISCONTINUED | OUTPATIENT
Start: 2022-07-18 | End: 2022-07-18 | Stop reason: HOSPADM

## 2022-07-18 RX ADMIN — PROPOFOL 150 MG: 10 INJECTION, EMULSION INTRAVENOUS at 13:35

## 2022-07-18 RX ADMIN — PROPOFOL 50 MG: 10 INJECTION, EMULSION INTRAVENOUS at 13:39

## 2022-07-18 RX ADMIN — LIDOCAINE HYDROCHLORIDE 0.1 ML: 10 INJECTION, SOLUTION EPIDURAL; INFILTRATION; INTRACAUDAL; PERINEURAL at 13:24

## 2022-07-18 RX ADMIN — SODIUM CHLORIDE, POTASSIUM CHLORIDE, SODIUM LACTATE AND CALCIUM CHLORIDE: 600; 310; 30; 20 INJECTION, SOLUTION INTRAVENOUS at 13:23

## 2022-07-18 RX ADMIN — PROPOFOL 150 MG: 10 INJECTION, EMULSION INTRAVENOUS at 13:37

## 2022-07-18 NOTE — ANESTHESIA CARE TRANSFER NOTE
Patient: Roni Martin    Procedure: Procedure(s):  COLONOSCOPY with polypectomy       Diagnosis: Positive colorectal cancer screening using Cologuard test [R19.5]  Diagnosis Additional Information: No value filed.    Anesthesia Type:   General     Note:    Oropharynx: oropharynx clear of all foreign objects and spontaneously breathing  Level of Consciousness: awake  Oxygen Supplementation: room air    Independent Airway: airway patency satisfactory and stable  Dentition: dentition unchanged  Vital Signs Stable: post-procedure vital signs reviewed and stable  Report to RN Given: handoff report given  Patient transferred to: Phase II    Handoff Report: Identifed the Patient, Identified the Reponsible Provider, Reviewed the pertinent medical history, Discussed the surgical course, Reviewed Intra-OP anesthesia mangement and issues during anesthesia, Set expectations for post-procedure period and Allowed opportunity for questions and acknowledgement of understanding      Vitals:  Vitals Value Taken Time   BP     Temp     Pulse     Resp     SpO2         Electronically Signed By: RITIKA Charles CRNA  July 18, 2022  1:49 PM

## 2022-07-18 NOTE — ANESTHESIA POSTPROCEDURE EVALUATION
Patient: Roni Martin    Procedure: Procedure(s):  COLONOSCOPY with polypectomy       Anesthesia Type:  General    Note:  Disposition: Outpatient   Postop Pain Control: Uneventful            Sign Out: Well controlled pain   PONV: No   Neuro/Psych: Uneventful            Sign Out: Acceptable/Baseline neuro status   Airway/Respiratory: Uneventful            Sign Out: Acceptable/Baseline resp. status   CV/Hemodynamics: Uneventful            Sign Out: Acceptable CV status; No obvious hypovolemia; No obvious fluid overload   Other NRE: NONE   DID A NON-ROUTINE EVENT OCCUR? No           Last vitals:  Vitals Value Taken Time   BP     Temp     Pulse     Resp     SpO2         Electronically Signed By: RITIKA Charles CRNA  July 18, 2022  1:52 PM

## 2022-07-18 NOTE — INTERVAL H&P NOTE
I have reviewed the surgical (or preoperative) H&P that is linked to this encounter, and examined the patient. There are no significant changes    1st colonoscopy; positive cologuard; no blood thinner;no famhx of colon cancer    Clinical Conditions Present on Arrival:  Clinically Significant Risk Factors Present on Admission                 # Obesity: Estimated body mass index is 30.52 kg/m  as calculated from the following:    Height as of this encounter: 1.829 m (6').    Weight as of this encounter: 102.1 kg (225 lb).

## 2022-07-18 NOTE — LETTER
Roni Martin  [id number] [facility]  [place] [address]    July 25, 2022    Dear Roni,  This letter is written to inform you of the results of your recent colonoscopy.  Your examination showed polyp(s) in your sigmoid colon and rectum. All polyps were removed in their entirety and sent for review by a pathologist. As you will see on the pathology report below, the tissue(s) were hyperplastic polyps and normal colon tissue. Your examination was otherwise without abnormality.    Final Diagnosis   A.  Colon, sigmoid: Polypectomy:  - Non-dysplastic colonic mucosa   - Multiple additional levels examined      B.  Rectum: Polypectomy:  - Hyperplastic polyp x2        Hyperplastic polyps are entirely benign (non-cancerous) and rarely associated with the development of additional polyps or colorectal cancer.    Given these findings,  I recommend that you undergo a repeat colonoscopy in ten years for screening. We will enter you into a recall system so you receive a reminder closer to the time that you are due for repeat examination.     Please remember that this recommendation is made with the understanding that you are not experiencing persistent changes in bowel function, bleeding per rectum, and/or significant abdominal pain. If you experience these symptoms, please contact your primary care provider for a further evaluation.     If you have any questions or concerns about the results of your colonoscopy or the appropriate follow-up, please contact my assistant at [phone].          Sincerely,        [facility],   [facility]  ___

## 2022-07-21 LAB
PATH REPORT.COMMENTS IMP SPEC: NORMAL
PATH REPORT.COMMENTS IMP SPEC: NORMAL
PATH REPORT.FINAL DX SPEC: NORMAL
PATH REPORT.GROSS SPEC: NORMAL
PATH REPORT.MICROSCOPIC SPEC OTHER STN: NORMAL
PATH REPORT.RELEVANT HX SPEC: NORMAL
PHOTO IMAGE: NORMAL

## 2022-12-29 DIAGNOSIS — J30.2 SEASONAL ALLERGIC RHINITIS, UNSPECIFIED TRIGGER: ICD-10-CM

## 2022-12-29 RX ORDER — LORATADINE AND PSEUDOEPHEDRINE SULFATE 10; 240 MG/1; MG/1
TABLET, FILM COATED, EXTENDED RELEASE ORAL
Qty: 90 TABLET | Refills: 0 | Status: SHIPPED | OUTPATIENT
Start: 2022-12-29 | End: 2023-02-07

## 2023-01-03 DIAGNOSIS — J30.2 SEASONAL ALLERGIC RHINITIS, UNSPECIFIED TRIGGER: ICD-10-CM

## 2023-01-03 RX ORDER — FLUTICASONE PROPIONATE 50 MCG
SPRAY, SUSPENSION (ML) NASAL
Qty: 48 G | Refills: 1 | Status: SHIPPED | OUTPATIENT
Start: 2023-01-03 | End: 2023-10-31

## 2023-02-07 ENCOUNTER — OFFICE VISIT (OUTPATIENT)
Dept: FAMILY MEDICINE | Facility: CLINIC | Age: 58
End: 2023-02-07
Payer: COMMERCIAL

## 2023-02-07 VITALS
WEIGHT: 244 LBS | HEIGHT: 72 IN | BODY MASS INDEX: 33.05 KG/M2 | SYSTOLIC BLOOD PRESSURE: 110 MMHG | DIASTOLIC BLOOD PRESSURE: 74 MMHG | HEART RATE: 87 BPM | RESPIRATION RATE: 20 BRPM | OXYGEN SATURATION: 97 % | TEMPERATURE: 98.2 F

## 2023-02-07 DIAGNOSIS — R07.89 CHEST HEAVINESS: ICD-10-CM

## 2023-02-07 DIAGNOSIS — K21.9 GASTROESOPHAGEAL REFLUX DISEASE, UNSPECIFIED WHETHER ESOPHAGITIS PRESENT: ICD-10-CM

## 2023-02-07 DIAGNOSIS — J30.2 SEASONAL ALLERGIC RHINITIS, UNSPECIFIED TRIGGER: Primary | ICD-10-CM

## 2023-02-07 DIAGNOSIS — E78.5 HYPERLIPIDEMIA LDL GOAL <130: ICD-10-CM

## 2023-02-07 PROCEDURE — 99214 OFFICE O/P EST MOD 30 MIN: CPT | Performed by: FAMILY MEDICINE

## 2023-02-07 RX ORDER — ATORVASTATIN CALCIUM 10 MG/1
10 TABLET, FILM COATED ORAL DAILY
Qty: 90 TABLET | Refills: 3 | Status: SHIPPED | OUTPATIENT
Start: 2023-02-07 | End: 2024-03-05

## 2023-02-07 RX ORDER — LORATADINE AND PSEUDOEPHEDRINE SULFATE 10; 240 MG/1; MG/1
TABLET, FILM COATED, EXTENDED RELEASE ORAL
Qty: 90 TABLET | Refills: 3 | Status: SHIPPED | OUTPATIENT
Start: 2023-02-07 | End: 2024-01-23

## 2023-02-07 ASSESSMENT — PAIN SCALES - GENERAL: PAINLEVEL: NO PAIN (0)

## 2023-02-07 NOTE — PROGRESS NOTES
Assessment & Plan     Seasonal allergic rhinitis, unspecified trigger  Known to have seasonal allergic rhinitis, using Claritin-D as needed, prescription refilled.  Patient concerned about asthma as underlying cause as well, allergist referral placed  - loratadine-pseudoePHEDrine (ALLERGY RELIEF/NASAL DECONGEST)  MG 24 hr tablet; TAKE 1 TABLET BY MOUTH ONCE DAILY AS NEEDED FOR ALLERGIES  - Adult Allergy/Asthma Referral; Future    Hyperlipidemia LDL goal <130  Lipitor prescription refilled. Healthy lifestyle modifications stressed including regular exercise, balanced diet, weight loss and limiting salt/caffeine/pop intake  - atorvastatin (LIPITOR) 10 MG tablet; Take 1 tablet (10 mg) by mouth daily    Chest heaviness  Complains of nocturnal chest heaviness, heartburn feeling intermittently, experiencing for about 1 year, once a month or so.  No exertional chest pain, shortness of breath, dyspnea or other relevant systemic symptoms.  Differentials discussed in detail including coronary ischemia versus gastroesophageal reflux disease.  Lexiscan ordered considering risk factors and suggested to use Prilosec  - NM Lexiscan stress test; Future    Gastroesophageal reflux disease, unspecified whether esophagitis present  As above         BMI:   Estimated body mass index is 33.09 kg/m  as calculated from the following:    Height as of this encounter: 1.829 m (6').    Weight as of this encounter: 110.7 kg (244 lb).   Weight management plan: Discussed healthy diet and exercise guidelines      Vasyl Lieberman MD  [facility]    Michael Tamayo is a 57 year old, presenting for the following health issues:  Allergies      History of Present Illness       Reason for visit:  Allergy med refill.   Symptoms include:  Has been having what he thinks are acid reflux attacks at night. Feels like someone is sitting on his chest. Becomes a breathing issue. Is wondering if he should be checked for asthma.    Symptom intensity:  Moderate    He eats 0-1 servings of fruits and vegetables daily.He consumes 2 sweetened beverage(s) daily.He exercises with enough effort to increase his heart rate 10 to 19 minutes per day.  He exercises with enough effort to increase his heart rate 4 days per week.   He is taking medications regularly.         Review of Systems   Constitutional, HEENT, cardiovascular, pulmonary, GI, , musculoskeletal, neuro, skin, endocrine and psych systems are negative, except as otherwise noted.      Objective    /74 (Cuff Size: Adult Large)   Pulse 87   Temp 98.2  F (36.8  C) (Tympanic)   Resp 20   Ht 1.829 m (6')   Wt 110.7 kg (244 lb)   SpO2 97%   BMI 33.09 kg/m    Body mass index is 33.09 kg/m .  Physical Exam   GENERAL: alert and no distress  EYES: Eyes grossly normal to inspection, PERRL and conjunctivae and sclerae normal  HENT: normal cephalic/atraumatic, ear canals and TM's normal, nose and mouth without ulcers or lesions, oropharynx clear and oral mucous membranes moist  NECK: no adenopathy, no asymmetry, masses, or scars and thyroid normal to palpation  RESP: lungs clear to auscultation - no rales, rhonchi or wheezes  CV: regular rates and rhythm, normal S1 S2, no S3 or S4 and no murmur, click or rub  ABDOMEN: soft, nontender  MS: no gross musculoskeletal defects noted, no edema  NEURO: Normal strength and tone, mentation intact and speech normal  PSYCH: mentation appears normal, affect normal/bright

## 2023-02-09 ENCOUNTER — TELEPHONE (OUTPATIENT)
Dept: ALLERGY | Facility: CLINIC | Age: 58
End: 2023-02-09
Payer: COMMERCIAL

## 2023-02-09 NOTE — TELEPHONE ENCOUNTER
Spoke with patient, appointment scheduled for March 23 at 9:30.  Patient has been placed on wait list for earlier appointment    Amaya PAREDES RN  Specialty/Allergy Clinic

## 2023-02-09 NOTE — TELEPHONE ENCOUNTER
Reason for Call:  Appointment Request    Patient requesting this type of appt:  Allergy visit urgent referal    Requested provider: Troy Lockhart    Reason patient unable to be scheduled: Not within requested timeframe    When does patient want to be seen/preferred time: 3-7 days    Comments: Patient called and has an urgent referral but he is wondering if he can be seen on the 22nd in the morning due to him having another apt that day.    Okay to leave a detailed message?: Yes at Home number on file [phone] (home)    Call taken on 2/9/2023 at 11:31 AM by Humaira Richardson

## 2023-02-22 ENCOUNTER — HOSPITAL ENCOUNTER (OUTPATIENT)
Dept: CARDIOLOGY | Facility: CLINIC | Age: 58
Discharge: HOME | End: 2023-02-22
Attending: FAMILY MEDICINE
Payer: COMMERCIAL

## 2023-02-22 ENCOUNTER — HOSPITAL ENCOUNTER (OUTPATIENT)
Dept: NUCLEAR MEDICINE | Facility: CLINIC | Age: 58
Setting detail: NUCLEAR MEDICINE
Discharge: HOME | End: 2023-02-22
Attending: FAMILY MEDICINE
Payer: COMMERCIAL

## 2023-02-22 DIAGNOSIS — R07.89 CHEST HEAVINESS: ICD-10-CM

## 2023-02-22 LAB
CV STRESS MAX HR HE: 148
RATE PRESSURE PRODUCT: NORMAL
STRESS ECHO BASELINE DIASTOLIC HE: 76
STRESS ECHO BASELINE HR: 67 BPM
STRESS ECHO BASELINE SYSTOLIC BP: 134
STRESS ECHO CALCULATED PERCENT HR: 92 %
STRESS ECHO LAST STRESS DIASTOLIC BP: 76
STRESS ECHO LAST STRESS SYSTOLIC BP: 160
STRESS ECHO POST ESTIMATED WORKLOAD: 10.1 METS
STRESS ECHO POST EXERCISE DUR MIN: 9 MIN
STRESS ECHO POST EXERCISE DUR SEC: 0 SEC
STRESS ECHO TARGET HR: 161

## 2023-02-22 PROCEDURE — 78452 HT MUSCLE IMAGE SPECT MULT: CPT

## 2023-02-22 PROCEDURE — 343N000001 HC RX 343: Performed by: FAMILY MEDICINE

## 2023-02-22 PROCEDURE — 93017 CV STRESS TEST TRACING ONLY: CPT

## 2023-02-22 PROCEDURE — 93016 CV STRESS TEST SUPVJ ONLY: CPT | Performed by: INTERNAL MEDICINE

## 2023-02-22 PROCEDURE — A9502 TC99M TETROFOSMIN: HCPCS | Performed by: FAMILY MEDICINE

## 2023-02-22 PROCEDURE — 78452 HT MUSCLE IMAGE SPECT MULT: CPT | Mod: 26 | Performed by: INTERNAL MEDICINE

## 2023-02-22 PROCEDURE — 93018 CV STRESS TEST I&R ONLY: CPT | Performed by: INTERNAL MEDICINE

## 2023-02-22 RX ADMIN — TETROFOSMIN 38.6 MILLICURIE: 1.38 INJECTION, POWDER, LYOPHILIZED, FOR SOLUTION INTRAVENOUS at 14:40

## 2023-02-22 RX ADMIN — TETROFOSMIN 12.2 MILLICURIE: 1.38 INJECTION, POWDER, LYOPHILIZED, FOR SOLUTION INTRAVENOUS at 13:10

## 2023-03-23 ENCOUNTER — OFFICE VISIT (OUTPATIENT)
Dept: ALLERGY | Facility: CLINIC | Age: 58
End: 2023-03-23
Attending: FAMILY MEDICINE
Payer: COMMERCIAL

## 2023-03-23 VITALS
WEIGHT: 240 LBS | OXYGEN SATURATION: 99 % | SYSTOLIC BLOOD PRESSURE: 129 MMHG | BODY MASS INDEX: 32.51 KG/M2 | HEIGHT: 72 IN | DIASTOLIC BLOOD PRESSURE: 83 MMHG | HEART RATE: 90 BPM

## 2023-03-23 DIAGNOSIS — H10.89 OTHER CONJUNCTIVITIS OF BOTH EYES: ICD-10-CM

## 2023-03-23 DIAGNOSIS — R07.89 CHEST PRESSURE: ICD-10-CM

## 2023-03-23 DIAGNOSIS — R06.2 WHEEZING: ICD-10-CM

## 2023-03-23 DIAGNOSIS — J30.2 SEASONAL ALLERGIC RHINITIS, UNSPECIFIED TRIGGER: Primary | ICD-10-CM

## 2023-03-23 LAB
BASOPHILS # BLD AUTO: 0 10E3/UL (ref 0–0.2)
BASOPHILS NFR BLD AUTO: 1 %
EOSINOPHIL # BLD AUTO: 0.2 10E3/UL (ref 0–0.7)
EOSINOPHIL NFR BLD AUTO: 4 %
ERYTHROCYTE [DISTWIDTH] IN BLOOD BY AUTOMATED COUNT: 12.9 % (ref 10–15)
FEF 25/75: NORMAL
FEV-1: NORMAL
FEV1/FVC: NORMAL
FVC: NORMAL
HCT VFR BLD AUTO: 43.2 % (ref 40–53)
HGB BLD-MCNC: 14.3 G/DL (ref 13.3–17.7)
LYMPHOCYTES # BLD AUTO: 1.1 10E3/UL (ref 0.8–5.3)
LYMPHOCYTES NFR BLD AUTO: 20 %
MCH RBC QN AUTO: 32.9 PG (ref 26.5–33)
MCHC RBC AUTO-ENTMCNC: 33.1 G/DL (ref 31.5–36.5)
MCV RBC AUTO: 99 FL (ref 78–100)
MONOCYTES # BLD AUTO: 0.7 10E3/UL (ref 0–1.3)
MONOCYTES NFR BLD AUTO: 12 %
NEUTROPHILS # BLD AUTO: 3.6 10E3/UL (ref 1.6–8.3)
NEUTROPHILS NFR BLD AUTO: 64 %
PLATELET # BLD AUTO: 277 10E3/UL (ref 150–450)
RBC # BLD AUTO: 4.35 10E6/UL (ref 4.4–5.9)
WBC # BLD AUTO: 5.6 10E3/UL (ref 4–11)

## 2023-03-23 PROCEDURE — 82785 ASSAY OF IGE: CPT | Performed by: ALLERGY & IMMUNOLOGY

## 2023-03-23 PROCEDURE — 36415 COLL VENOUS BLD VENIPUNCTURE: CPT | Performed by: ALLERGY & IMMUNOLOGY

## 2023-03-23 PROCEDURE — 86003 ALLG SPEC IGE CRUDE XTRC EA: CPT | Performed by: ALLERGY & IMMUNOLOGY

## 2023-03-23 PROCEDURE — 99244 OFF/OP CNSLTJ NEW/EST MOD 40: CPT | Mod: 25 | Performed by: ALLERGY & IMMUNOLOGY

## 2023-03-23 PROCEDURE — 85025 COMPLETE CBC W/AUTO DIFF WBC: CPT | Performed by: ALLERGY & IMMUNOLOGY

## 2023-03-23 PROCEDURE — 94010 BREATHING CAPACITY TEST: CPT | Performed by: ALLERGY & IMMUNOLOGY

## 2023-03-23 RX ORDER — AZELASTINE HYDROCHLORIDE 0.5 MG/ML
1 SOLUTION/ DROPS OPHTHALMIC 2 TIMES DAILY PRN
Qty: 6 ML | Refills: 3 | Status: SHIPPED | OUTPATIENT
Start: 2023-03-23 | End: 2024-03-12

## 2023-03-23 RX ORDER — AZELASTINE 1 MG/ML
2 SPRAY, METERED NASAL 2 TIMES DAILY PRN
Qty: 30 ML | Refills: 3 | Status: SHIPPED | OUTPATIENT
Start: 2023-03-23 | End: 2024-03-12

## 2023-03-23 RX ORDER — ALBUTEROL SULFATE 90 UG/1
2-4 AEROSOL, METERED RESPIRATORY (INHALATION) EVERY 4 HOURS PRN
Qty: 18 G | Refills: 3 | Status: SHIPPED | OUTPATIENT
Start: 2023-03-23 | End: 2024-03-12

## 2023-03-23 ASSESSMENT — ENCOUNTER SYMPTOMS
CHEST TIGHTNESS: 0
FEVER: 0
ADENOPATHY: 0
COUGH: 1
SINUS PRESSURE: 0
HEADACHES: 0
EYE ITCHING: 1
ACTIVITY CHANGE: 0
FATIGUE: 0
VOMITING: 0
NAUSEA: 0
FACIAL SWELLING: 0
WHEEZING: 1
STRIDOR: 0
SHORTNESS OF BREATH: 1
EYE DISCHARGE: 0
EYE REDNESS: 0
RHINORRHEA: 1
DIARRHEA: 0

## 2023-03-23 ASSESSMENT — PAIN SCALES - GENERAL: PAINLEVEL: NO PAIN (0)

## 2023-03-23 NOTE — PROGRESS NOTES
SUBJECTIVE:                                                                   Roni Martin is a 57-year-old male who presents today to our Allergy Clinic at [facility]; He is being seen in consultation at the request of Dr. Vasyl Lieberman for an evaluation of seasonal allergies.    Has a history of chronic rhinoconjunctivitis symptoms for the past 20 years.  Perennial pattern with Spring, Summer, and Fall exacerbations.  Manifested by clear rhinorrhea, bilateral nasal congestion, frequent sneezing, and itchy/watery eyes.  He is not sure whether his symptoms are worse around cats or dogs.  Exposure to dust, mowing grass, raking leaves and strong scents can cause symptoms.  A combination of intranasal fluticasone 2 sprays in each nostril once daily and Claritin-D daily provides 60% symptom improvement.    No history of sinus surgeries in the past.    Once or twice a year, he develops episodes of chest pressure that wakes him up at night and causes wheezing with cough.  Resolves in 1 hour.  He states that he had cardiac work-up done which was normal.    He has never tried an albuterol inhaler.  Denies having the symptoms during the day.      Patient Active Problem List   Diagnosis     Obesity     Benign essential hypertension     Elevated fasting blood sugar     Hyperlipidemia LDL goal <130       Past Medical History:   Diagnosis Date     Hypertension       Problem (# of Occurrences) Relation (Name,Age of Onset)    Parkinsonism (1) Mother    Diabetes (1) Father    Hypertension (1) Father       Negative family history of: Hyperlipidemia, Heart Disease        Past Surgical History:   Procedure Laterality Date     COLONOSCOPY N/A 07/18/2022    Procedure: COLONOSCOPY with polypectomy;  Surgeon: Darin Willoughby MD;  Location: [facility]     IR THORACENTESIS  11/25/2019     THORACENTESIS Left 11/25/2019    Procedure: THORACENTESIS;  Surgeon: Gal Will PA-C;  [facility]  History     Socioeconomic History     Marital status:      Spouse name: None     Number of children: None     Years of education: None     Highest education level: None   Occupational History     Occupation: construction, [employer]   Tobacco Use     Smoking status: Never     Smokeless tobacco: Never   Vaping Use     Vaping Use: Never used   Substance and Sexual Activity     Alcohol use: Yes     Comment: 3 times per week     Drug use: No     Sexual activity: Yes     Partners: Female   Social History Narrative    3/23/23    ENVIRONMENTAL HISTORY: [place] The home is heated with a forced air. They do have central air conditioning. The patient's bedroom is furnished with carpeting in bedroom.  Pets inside the house include 1 cat(s). There is no history of cockroach or mice infestation. There is/are 0 smokers in the house.  The house does not have a damp basement.            Review of Systems   Constitutional: Negative for activity change, fatigue and fever.   HENT: Positive for congestion, rhinorrhea and sneezing. Negative for ear pain, facial swelling, nosebleeds, postnasal drip and sinus pressure.    Eyes: Positive for itching. Negative for discharge and redness.   Respiratory: Positive for cough, shortness of breath and wheezing. Negative for chest tightness and stridor.    Gastrointestinal: Negative for diarrhea, nausea and vomiting.   Skin: Negative for rash.   Allergic/Immunologic: Negative for environmental allergies.   Neurological: Negative for headaches.   Hematological: Negative for adenopathy.   Psychiatric/Behavioral: Negative for self-injury.           Current Outpatient Medications:      albuterol (PROAIR HFA/PROVENTIL HFA/VENTOLIN HFA) 108 (90 Base) MCG/ACT inhaler, Inhale 2-4 puffs into the lungs every 4 hours as needed for shortness of breath or wheezing, Disp: 18 g, Rfl: 3     allopurinol (ZYLOPRIM) 300 MG tablet, Take 1 tablet (300 mg) by mouth daily,  Disp: 90 tablet, Rfl: 3     atorvastatin (LIPITOR) 10 MG tablet, Take 1 tablet (10 mg) by mouth daily, Disp: 90 tablet, Rfl: 3     azelastine (ASTELIN) 0.1 % nasal spray, Spray 2 sprays into both nostrils 2 times daily as needed for rhinitis, Disp: 30 mL, Rfl: 3     azelastine (OPTIVAR) 0.05 % ophthalmic solution, Apply 1 drop to eye 2 times daily as needed (itchy/water eyes), Disp: 6 mL, Rfl: 3     diltiazem ER (TIADYLT ER) 360 MG 24 hr ER beaded capsule, TAKE 1 CAPSULE BY MOUTH ONCE DAILY, Disp: 90 capsule, Rfl: 1     fluticasone (FLONASE) 50 MCG/ACT nasal spray, SPRAY 1-2 SPRAYS INTO BOTH NOSTRILS ONCE DAILY, Disp: 48 g, Rfl: 1     lisinopril (ZESTRIL) 10 MG tablet, Take 1 tablet (10 mg) by mouth daily, Disp: 90 tablet, Rfl: 3     loratadine-pseudoePHEDrine (ALLERGY RELIEF/NASAL DECONGEST)  MG 24 hr tablet, TAKE 1 TABLET BY MOUTH ONCE DAILY AS NEEDED FOR ALLERGIES, Disp: 90 tablet, Rfl: 3  Immunization History   Administered Date(s) Administered     COVID-19 Vaccine 18+ (Moderna) 04/23/2021, 05/21/2021     COVID-19,PF,Moderna Booster 05/25/2022     Influenza Vaccine 50-64 or 18-64 w/egg allergy (Flublok) 11/20/2019     Influenza Vaccine >6 months (Alfuria,Fluzone) 12/13/2017     TDAP Vaccine (Adacel) 10/11/2014     Tdap (Adult) Unspecified Formulation 1965     No Known Allergies  OBJECTIVE:                                                                 /83   Pulse 90   Ht 1.829 m (6')   Wt 108.9 kg (240 lb)   SpO2 99%   BMI 32.55 kg/m          Physical Exam  Vitals and nursing note reviewed.   Constitutional:       General: He is not in acute distress.     Appearance: He is not diaphoretic.   HENT:      Head: Normocephalic and atraumatic.      Right Ear: Tympanic membrane, ear canal and external ear normal.      Left Ear: Tympanic membrane, ear canal and external ear normal.      Nose: Septal deviation (Septal spur bilaterally, leftward septal deviation) and mucosal edema (Mild) present.       Right Turbinates: Enlarged (Mildly).      Left Turbinates: Enlarged (Mildly).      Mouth/Throat:      Lips: Pink.      Mouth: Mucous membranes are moist.      Pharynx: Oropharynx is clear. No pharyngeal swelling, oropharyngeal exudate or posterior oropharyngeal erythema.   Eyes:      General:         Right eye: No discharge.         Left eye: No discharge.      Conjunctiva/sclera: Conjunctivae normal.   Cardiovascular:      Rate and Rhythm: Normal rate and regular rhythm.      Heart sounds: Normal heart sounds. No murmur heard.  Pulmonary:      Effort: Pulmonary effort is normal. No respiratory distress.      Breath sounds: Normal breath sounds and air entry. No stridor, decreased air movement or transmitted upper airway sounds. No decreased breath sounds, wheezing, rhonchi or rales.   Musculoskeletal:         General: Normal range of motion.   Skin:     General: Skin is warm.   Neurological:      Mental Status: He is alert and oriented to person, place, and time.   Psychiatric:         Mood and Affect: Mood normal.         Behavior: Behavior normal.       WORKUP:     SPIROMETRY       FVC 4.77L (93% of predicted).     FEV1 3.84L (99% of predicted).     FEV1/FVC 81%      I have reviewed and interpreted these results. My interpretation:The office spirometry performed today doesn't suggest an obstruction.      ASSESSMENT/PLAN:    Seasonal allergic rhinitis, unspecified trigger  Other conjunctivitis of both eyes    Unable to perform SPT for aeroallergens today.  He took Claritin-D 2 days ago.  -Ordered serum IgE for regional aeroallergen panel.  - Use intranasal fluticasone (Flonase) 1-2 sprays in each nostril once daily.  - Add azelastine nasal spray, 2 sprays in each nostril twice daily as needed.  - Consider taking Claritin-D just as needed.  - Use Optivar 1 drop in each eye twice daily as needed.    - azelastine (ASTELIN) 0.1 % nasal spray  Dispense: 30 mL; Refill: 3  - Allergen cat epithellium IgE  - Allergen  dog epithelium IgE  - Allergen Eliud grass IgE  - Allergen orchard grass IgE  - Allergen shady IgE  - Allergen D farinae IgE  - Allergen D pteronyssinus IgE  - Allergen alternaria alternata IgE  - Allergen aspergillus fumigatus IgE  - Allergen cladosporium herbarum IgE  - Allergen Epicoccum purpurascens IgE  - Allergen penicillium notatum IgE  - Allergen rich white IgE  - Allergen Cedar IgE  - Allergen cottonwood IgE  - Allergen elm IgE  - Allergen maple box elder IgE  - Allergen oak white IgE  - Allergen Red [place] IgE  - Allergen silver  birch IgE  - Allergen Tree White [place] IgE  - Allergen [place] Tree  - Allergen white pine IgE  - Allergen English plantain IgE  - Allergen giant ragweed IgE  - Allergen lamb's quarter IgE  - Allergen Mugwort IgE  - Allergen ragweed short IgE  - Allergen Sagebrush Wormwood IgE  - Allergen Sheep Sorrel IgE  - Allergen thistle Russian IgE  - Allergen Weed Nettle IgE  - Allergen, Kochia/Firebush  - IgE  - azelastine (OPTIVAR) 0.05 % ophthalmic solution  Dispense: 6 mL; Refill: 3    Wheezing  Chest pressure  His symptoms happen at night only.  Denies having similar symptoms during the day.  - He will try albuterol as needed, but if that does not help, I would recommend to discuss evaluation for obstructive sleep apnea with PCP.  - Ordered baseline CBC with differential (hypereosinophilia?).    - CBC with Platelets & Differential  - BREATHING CAPACITY TEST [05036]  - albuterol (PROAIR HFA/PROVENTIL HFA/VENTOLIN HFA) 108 (90 Base) MCG/ACT inhaler  Dispense: 18 g; Refill: 3       Return in about 2 months (around 5/23/2023), or if symptoms worsen or fail to improve.    Thank you for allowing us to participate in the care of this patient. Please feel free to contact us if there are any questions or concerns about the patient.    Disclaimer: This note consists of symbols derived from keyboarding, dictation and/or voice recognition software. As a result, there may be errors in the  script that have gone undetected. Please consider this when interpreting information found in this chart.    rToy Lockhart MD, FAAAAI, FACAAI  Allergy, Asthma and Immunology     [facility]

## 2023-03-23 NOTE — PATIENT INSTRUCTIONS
Get the bloodwork done.     Continue Flonase 2 sprays in each nostril once daily.  -Add azelastine 2 sprays in each nostril twice a day when necessary.     Take Claritin-D as needed.     Optivar 1 drop in each eye twice daily as needed.       -Start albuterol inhaler 2-4 puffs every 4 hours as needed for chest tightness/wheezing/shortness of breath/persistent cough.      If chest pressure at night continues, discuss sleep study with your PCP.             If labs have been ordered/completed, please allow 7-14 business days for final interpretation of results to be sent on My Chart, phone or mail. Some lab results can take up to 28 days for results.       Allergy Staff Appt Hours Shot Hours Locations    Physician     Troy Lockhart MD       Support Staff     [facility]    Tuesday:   [place] :  [place]: :         :  [facility] 7-3     [place]        Tuesday: :20        Wednesday: :20     : 7-4:10        Tuesday: 7-:10        Thursday: 7-3:10    [facility]       Tues & Wed: :10       Thurs: 12-4:10       Fri:            [facility]  [address] [place] [address]  Appt Line: [phone]      [facility]  [address] [place] [address]  Appt Line: [phone]    Pulmonary Function Scheduling:  [place]: [phone]  [place]: [phone]  [phone]

## 2023-03-23 NOTE — NURSING NOTE
Writer demonstrated how to use an MDI inhaler with a spacer for patient.  Patient instructed to shake the inhaler for 5 seconds, empty the lungs by exhaling away from inhaler, put the inhaler + spacer in their mouth, push down on the inhaler and breathe in at the same time and then hold breath for 10 seconds.  RN informed patient that if an audible whistle is heard from spacer, they needs to inhale more slowly.  Patient advised to wait 1-2 minutes between puffs.  Patient instructed on how to clean the MDI inhaler, take the medication canister out, wash it in warm soapy water, rinse it and then let it dry over night.  RN advised patient to refer to handout with spacer for cleaning instructions.  Patient informed the inhaler needs to be washed once a week or when he notices a powder buildup.  Patient verbalized understanding.     Alessia SNOWDEN RN  Specialty/Allergy Clinics

## 2023-03-23 NOTE — LETTER
3/23/2023         RE: Roni Martin  [address]  [place] [address]        Dear Colleague,    Thank you for referring your patient, Roni Martin, to the [facility]. Please see a copy of my visit note below.    SUBJECTIVE:                                                                   Roni Martin is a 57-year-old male who presents today to our Allergy Clinic at [facility]; He is being seen in consultation at the request of Dr. Vasyl Lieberman for an evaluation of seasonal allergies.    Has a history of chronic rhinoconjunctivitis symptoms for the past 20 years.  Perennial pattern with Spring, Summer, and Fall exacerbations.  Manifested by clear rhinorrhea, bilateral nasal congestion, frequent sneezing, and itchy/watery eyes.  He is not sure whether his symptoms are worse around cats or dogs.  Exposure to dust, mowing grass, raking leaves and strong scents can cause symptoms.  A combination of intranasal fluticasone 2 sprays in each nostril once daily and Claritin-D daily provides 60% symptom improvement.    No history of sinus surgeries in the past.    Once or twice a year, he develops episodes of chest pressure that wakes him up at night and causes wheezing with cough.  Resolves in 1 hour.  He states that he had cardiac work-up done which was normal.    He has never tried an albuterol inhaler.  Denies having the symptoms during the day.      Patient Active Problem List   Diagnosis     Obesity     Benign essential hypertension     Elevated fasting blood sugar     Hyperlipidemia LDL goal <130       Past Medical History:   Diagnosis Date     Hypertension       Problem (# of Occurrences) Relation (Name,Age of Onset)    Parkinsonism (1) Mother    Diabetes (1) Father    Hypertension (1) Father       Negative family history of: Hyperlipidemia, Heart Disease        Past Surgical History:   Procedure Laterality Date     COLONOSCOPY N/A 07/18/2022    Procedure:  COLONOSCOPY with polypectomy;  Surgeon: Darin Willoughby MD;  Location: [facility]     IR THORACENTESIS  11/25/2019     THORACENTESIS Left 11/25/2019    Procedure: THORACENTESIS;  Surgeon: Gal Will PA-C;  Location:  OR     Social History     Socioeconomic History     Marital status:      Spouse name: None     Number of children: None     Years of education: None     Highest education level: None   Occupational History     Occupation: construction, [employer]   Tobacco Use     Smoking status: Never     Smokeless tobacco: Never   Vaping Use     Vaping Use: Never used   Substance and Sexual Activity     Alcohol use: Yes     Comment: 3 times per week     Drug use: No     Sexual activity: Yes     Partners: Female   Social History Narrative    3/23/23    ENVIRONMENTAL HISTORY: [place] is heated with a forced air. They do have central air conditioning. The patient's bedroom is furnished with carpeting in bedroom.  Pets inside the house include 1 cat(s). There is no history of cockroach or mice infestation. There is/are 0 smokers in the house.  The house does not have a damp basement.            Review of Systems   Constitutional: Negative for activity change, fatigue and fever.   HENT: Positive for congestion, rhinorrhea and sneezing. Negative for ear pain, facial swelling, nosebleeds, postnasal drip and sinus pressure.    Eyes: Positive for itching. Negative for discharge and redness.   Respiratory: Positive for cough, shortness of breath and wheezing. Negative for chest tightness and stridor.    Gastrointestinal: Negative for diarrhea, nausea and vomiting.   Skin: Negative for rash.   Allergic/Immunologic: Negative for environmental allergies.   Neurological: Negative for headaches.   Hematological: Negative for adenopathy.   Psychiatric/Behavioral: Negative for self-injury.           Current Outpatient Medications:      albuterol (PROAIR HFA/PROVENTIL  HFA/VENTOLIN HFA) 108 (90 Base) MCG/ACT inhaler, Inhale 2-4 puffs into the lungs every 4 hours as needed for shortness of breath or wheezing, Disp: 18 g, Rfl: 3     allopurinol (ZYLOPRIM) 300 MG tablet, Take 1 tablet (300 mg) by mouth daily, Disp: 90 tablet, Rfl: 3     atorvastatin (LIPITOR) 10 MG tablet, Take 1 tablet (10 mg) by mouth daily, Disp: 90 tablet, Rfl: 3     azelastine (ASTELIN) 0.1 % nasal spray, Spray 2 sprays into both nostrils 2 times daily as needed for rhinitis, Disp: 30 mL, Rfl: 3     azelastine (OPTIVAR) 0.05 % ophthalmic solution, Apply 1 drop to eye 2 times daily as needed (itchy/water eyes), Disp: 6 mL, Rfl: 3     diltiazem ER (TIADYLT ER) 360 MG 24 hr ER beaded capsule, TAKE 1 CAPSULE BY MOUTH ONCE DAILY, Disp: 90 capsule, Rfl: 1     fluticasone (FLONASE) 50 MCG/ACT nasal spray, SPRAY 1-2 SPRAYS INTO BOTH NOSTRILS ONCE DAILY, Disp: 48 g, Rfl: 1     lisinopril (ZESTRIL) 10 MG tablet, Take 1 tablet (10 mg) by mouth daily, Disp: 90 tablet, Rfl: 3     loratadine-pseudoePHEDrine (ALLERGY RELIEF/NASAL DECONGEST)  MG 24 hr tablet, TAKE 1 TABLET BY MOUTH ONCE DAILY AS NEEDED FOR ALLERGIES, Disp: 90 tablet, Rfl: 3  Immunization History   Administered Date(s) Administered     COVID-19 Vaccine 18+ (Moderna) 04/23/2021, 05/21/2021     COVID-19,PF,Moderna Booster 05/25/2022     Influenza Vaccine 50-64 or 18-64 w/egg allergy (Flublok) 11/20/2019     Influenza Vaccine >6 months (Alfuria,Fluzone) 12/13/2017     TDAP Vaccine (Adacel) 10/11/2014     Tdap (Adult) Unspecified Formulation 1965     No Known Allergies  OBJECTIVE:                                                                 /83   Pulse 90   Ht 1.829 m (6')   Wt 108.9 kg (240 lb)   SpO2 99%   BMI 32.55 kg/m          Physical Exam  Vitals and nursing note reviewed.   Constitutional:       General: He is not in acute distress.     Appearance: He is not diaphoretic.   HENT:      Head: Normocephalic and atraumatic.      Right  Ear: Tympanic membrane, ear canal and external ear normal.      Left Ear: Tympanic membrane, ear canal and external ear normal.      Nose: Septal deviation (Septal spur bilaterally, leftward septal deviation) and mucosal edema (Mild) present.      Right Turbinates: Enlarged (Mildly).      Left Turbinates: Enlarged (Mildly).      Mouth/Throat:      Lips: Pink.      Mouth: Mucous membranes are moist.      Pharynx: Oropharynx is clear. No pharyngeal swelling, oropharyngeal exudate or posterior oropharyngeal erythema.   Eyes:      General:         Right eye: No discharge.         Left eye: No discharge.      Conjunctiva/sclera: Conjunctivae normal.   Cardiovascular:      Rate and Rhythm: Normal rate and regular rhythm.      Heart sounds: Normal heart sounds. No murmur heard.  Pulmonary:      Effort: Pulmonary effort is normal. No respiratory distress.      Breath sounds: Normal breath sounds and air entry. No stridor, decreased air movement or transmitted upper airway sounds. No decreased breath sounds, wheezing, rhonchi or rales.   Musculoskeletal:         General: Normal range of motion.   Skin:     General: Skin is warm.   Neurological:      Mental Status: He is alert and oriented to person, place, and time.   Psychiatric:         Mood and Affect: Mood normal.         Behavior: Behavior normal.       WORKUP:     SPIROMETRY       FVC 4.77L (93% of predicted).     FEV1 3.84L (99% of predicted).     FEV1/FVC 81%      I have reviewed and interpreted these results. My interpretation:The office spirometry performed today doesn't suggest an obstruction.      ASSESSMENT/PLAN:    Seasonal allergic rhinitis, unspecified trigger  Other conjunctivitis of both eyes    Unable to perform SPT for aeroallergens today.  He took Claritin-D 2 days ago.  -Ordered serum IgE for regional aeroallergen panel.  - Use intranasal fluticasone (Flonase) 1-2 sprays in each nostril once daily.  - Add azelastine nasal spray, 2 sprays in each nostril  twice daily as needed.  - Consider taking Claritin-D just as needed.  - Use Optivar 1 drop in each eye twice daily as needed.    - azelastine (ASTELIN) 0.1 % nasal spray  Dispense: 30 mL; Refill: 3  - Allergen cat epithellium IgE  - Allergen dog epithelium IgE  - Allergen Eliud grass IgE  - Allergen orchard grass IgE  - Allergen shady IgE  - Allergen D farinae IgE  - Allergen D pteronyssinus IgE  - Allergen alternaria alternata IgE  - Allergen aspergillus fumigatus IgE  - Allergen cladosporium herbarum IgE  - Allergen Epicoccum purpurascens IgE  - Allergen penicillium notatum IgE  - Allergen rich white IgE  - Allergen Cedar IgE  - Allergen cottonwood IgE  - Allergen elm IgE  - Allergen maple box elder IgE  - Allergen oak white IgE  - Allergen [place] IgE  - Allergen silver  birch IgE  - Allergen Tree [place] IgE  - Allergen Mitchell Tree  - Allergen white pine IgE  - Allergen English plantain IgE  - Allergen giant ragweed IgE  - Allergen lamb's quarter IgE  - Allergen Mugwort IgE  - Allergen ragweed short IgE  - Allergen Sagebrush Wormwood IgE  - Allergen Sheep Sorrel IgE  - Allergen thistle Russian IgE  - Allergen Weed Nettle IgE  - Allergen, Kochia/Firebush  - IgE  - azelastine (OPTIVAR) 0.05 % ophthalmic solution  Dispense: 6 mL; Refill: 3    Wheezing  Chest pressure  His symptoms happen at night only.  Denies having similar symptoms during the day.  - He will try albuterol as needed, but if that does not help, I would recommend to discuss evaluation for obstructive sleep apnea with PCP.  - Ordered baseline CBC with differential (hypereosinophilia?).    - CBC with Platelets & Differential  - BREATHING CAPACITY TEST [[id number]]  - albuterol (PROAIR HFA/PROVENTIL HFA/VENTOLIN HFA) 108 (90 Base) MCG/ACT inhaler  Dispense: 18 g; Refill: 3       Return in about 2 months (around 5/23/2023), or if symptoms worsen or fail to improve.    Thank you for allowing us to participate in the care of this patient. Please  feel free to contact us if there are any questions or concerns about the patient.    Disclaimer: This note consists of symbols derived from keyboarding, dictation and/or voice recognition software. As a result, there may be errors in the script that have gone undetected. Please consider this when interpreting information found in this chart.    Troy Lockhart MD, FAAAAI, FACAAI  Allergy, Asthma and Immunology     [facility]        Again, thank you for allowing me to participate in the care of your patient.        Sincerely,        Troy Lockhart MD

## 2023-03-23 NOTE — LETTER
April 5, 2023      Roni Martin  [address]  [place] [address]        Dear ,    We are writing to inform you of your test results.    {results letter list:[id number]}    Resulted Orders   IgE   Result Value Ref Range    Immunoglobulin E 1,305 (H) 0 - 114 kU/L   Allergen, Kochia/Firebush   Result Value Ref Range    Kochia/Firebush IgE 0.26 (H) <0.10 KU(A)/L      Comment:      Interpretation: Low    Narrative    ImmunoCAP Specific IgE Blood Test Quantitative Scoring  <0.10 kU(A)/L        Absent/undetectable  0.10-0.69 kU(A)/L    Low  0.70-3.49 kU(A)/L    Moderate  3.50-17.50 kU(A)/L   High  >17.50 kU(A)/L      Very High  Please note: In general, low IgE antibody levels indicate a low probability of clinical disease, whereas high antibody levels to an allergen show good correlation with clinical disease.   Allergen Weed Nettle IgE   Result Value Ref Range    Weed Nettle IgE 0.61 (H) <0.10 KU(A)/L      Comment:      Interpretation: Low    Narrative    ImmunoCAP Specific IgE Blood Test Quantitative Scoring  <0.10 kU(A)/L        Absent/undetectable  0.10-0.69 kU(A)/L    Low  0.70-3.49 kU(A)/L    Moderate  3.50-17.50 kU(A)/L   High  >17.50 kU(A)/L      Very High  Please note: In general, low IgE antibody levels indicate a low probability of clinical disease, whereas high antibody levels to an allergen show good correlation with clinical disease.   Allergen thistle Russian IgE   Result Value Ref Range    Russian Thistle IgE 0.53 (H) <0.10 KU(A)/L      Comment:      Interpretation: Low    Narrative    ImmunoCAP Specific IgE Blood Test Quantitative Scoring  <0.10 kU(A)/L        Absent/undetectable  0.10-0.69 kU(A)/L    Low  0.70-3.49 kU(A)/L    Moderate  3.50-17.50 kU(A)/L   High  >17.50 kU(A)/L      Very High  Please note: In general, low IgE antibody levels indicate a low probability of clinical disease, whereas high antibody levels to an allergen show good correlation with clinical disease.   Allergen Sheep Sorrel  IgE   Result Value Ref Range    Sheep Sorrel IgE 1.18 (H) <0.10 KU(A)/L      Comment:      Interpretation: Moderate    Narrative    ImmunoCAP Specific IgE Blood Test Quantitative Scoring  <0.10 kU(A)/L        Absent/undetectable  0.10-0.69 kU(A)/L    Low  0.70-3.49 kU(A)/L    Moderate  3.50-17.50 kU(A)/L   High  >17.50 kU(A)/L      Very High  Please note: In general, low IgE antibody levels indicate a low probability of clinical disease, whereas high antibody levels to an allergen show good correlation with clinical disease.   Allergen Sagebrush [place]orm[place] IgE   Result Value Ref Range    Sagebrush [place] IgE 1.74 (H) <0.10 KU(A)/L      Comment:      Interpretation: Moderate    Narrative    ImmunoCAP Specific IgE Blood Test Quantitative Scoring  <0.10 kU(A)/L        Absent/undetectable  0.10-0.69 kU(A)/L    Low  0.70-3.49 kU(A)/L    Moderate  3.50-17.50 kU(A)/L   High  >17.50 kU(A)/L      Very High  Please note: In general, low IgE antibody levels indicate a low probability of clinical disease, whereas high antibody levels to an allergen show good correlation with clinical disease.   Allergen ragweed short IgE   Result Value Ref Range    Ragweed Short IgE 0.87 (H) <0.10 KU(A)/L      Comment:      Interpretation: Moderate    Narrative    ImmunoCAP Specific IgE Blood Test Quantitative Scoring  <0.10 kU(A)/L        Absent/undetectable  0.10-0.69 kU(A)/L    Low  0.70-3.49 kU(A)/L    Moderate  3.50-17.50 kU(A)/L   High  >17.50 kU(A)/L      Very High  Please note: In general, low IgE antibody levels indicate a low probability of clinical disease, whereas high antibody levels to an allergen show good correlation with clinical disease.   Allergen Mugwort IgE   Result Value Ref Range    Mugwort IgE 1.20 (H) <0.10 KU(A)/L      Comment:      Interpretation: Moderate    Narrative    ImmunoCAP Specific IgE Blood Test Quantitative Scoring  <0.10 kU(A)/L        Absent/undetectable  0.10-0.69 kU(A)/L    Low  0.70-3.49 kU(A)/L     Moderate  3.50-17.50 kU(A)/L   High  >17.50 kU(A)/L      Very High  Please note: In general, low IgE antibody levels indicate a low probability of clinical disease, whereas high antibody levels to an allergen show good correlation with clinical disease.   Allergen lamb's quarter IgE   Result Value Ref Range    Lamb's Quarter's IgE 1.48 (H) <0.10 KU(A)/L      Comment:      Interpretation: Moderate    Narrative    ImmunoCAP Specific IgE Blood Test Quantitative Scoring  <0.10 kU(A)/L        Absent/undetectable  0.10-0.69 kU(A)/L    Low  0.70-3.49 kU(A)/L    Moderate  3.50-17.50 kU(A)/L   High  >17.50 kU(A)/L      Very High  Please note: In general, low IgE antibody levels indicate a low probability of clinical disease, whereas high antibody levels to an allergen show good correlation with clinical disease.   Allergen giant ragweed IgE   Result Value Ref Range    Giant Ragweed IgE 1.39 (H) <0.10 KU(A)/L      Comment:      Interpretation: Moderate    Narrative    ImmunoCAP Specific IgE Blood Test Quantitative Scoring  <0.10 kU(A)/L        Absent/undetectable  0.10-0.69 kU(A)/L    Low  0.70-3.49 kU(A)/L    Moderate  3.50-17.50 kU(A)/L   High  >17.50 kU(A)/L      Very High  Please note: In general, low IgE antibody levels indicate a low probability of clinical disease, whereas high antibody levels to an allergen show good correlation with clinical disease.   Allergen English plantain IgE   Result Value Ref Range    English Plantain IgE 0.53 (H) <0.10 KU(A)/L      Comment:      Interpretation: Low    Narrative    ImmunoCAP Specific IgE Blood Test Quantitative Scoring  <0.10 kU(A)/L        Absent/undetectable  0.10-0.69 kU(A)/L    Low  0.70-3.49 kU(A)/L    Moderate  3.50-17.50 kU(A)/L   High  >17.50 kU(A)/L      Very High  Please note: In general, low IgE antibody levels indicate a low probability of clinical disease, whereas high antibody levels to an allergen show good correlation with clinical disease.   Allergen white  pine IgE   Result Value Ref Range    White [place] IgE <0.10 <0.10 KU(A)/L      Comment:      Interpretation: None Detected    Narrative    ImmunoCAP Specific IgE Blood Test Quantitative Scoring  <0.10 kU(A)/L        Absent/undetectable  0.10-0.69 kU(A)/L    Low  0.70-3.49 kU(A)/L    Moderate  3.50-17.50 kU(A)/L   High  >17.50 kU(A)/L      Very High  Please note: In general, low IgE antibody levels indicate a low probability of clinical disease, whereas high antibody levels to an allergen show good correlation with clinical disease.   Allergen [place] Tree   Result Value Ref Range    [place] Tree IgE 2.94 (H) <0.10 KU(A)/L      Comment:      Interpretation: Moderate    Narrative    ImmunoCAP Specific IgE Blood Test Quantitative Scoring  <0.10 kU(A)/L        Absent/undetectable  0.10-0.69 kU(A)/L    Low  0.70-3.49 kU(A)/L    Moderate  3.50-17.50 kU(A)/L   High  >17.50 kU(A)/L      Very High  Please note: In general, low IgE antibody levels indicate a low probability of clinical disease, whereas high antibody levels to an allergen show good correlation with clinical disease.   Allergen Tree White [place] IgE   Result Value Ref Range    White [place] IgE 0.17 (H) <0.10 KU(A)/L      Comment:      Interpretation: Low    Narrative    ImmunoCAP Specific IgE Blood Test Quantitative Scoring  <0.10 kU(A)/L        Absent/undetectable  0.10-0.69 kU(A)/L    Low  0.70-3.49 kU(A)/L    Moderate  3.50-17.50 kU(A)/L   High  >17.50 kU(A)/L      Very High  Please note: In general, low IgE antibody levels indicate a low probability of clinical disease, whereas high antibody levels to an allergen show good correlation with clinical disease.   Allergen silver  birch IgE   Result Value Ref Range    Silver Birch IgE 13.80 (H) <0.10 KU(A)/L      Comment:      Interpretation: High    Narrative    ImmunoCAP Specific IgE Blood Test Quantitative Scoring  <0.10 kU(A)/L        Absent/undetectable  0.10-0.69 kU(A)/L    Low  0.70-3.49 kU(A)/L     Moderate  3.50-17.50 kU(A)/L   High  >17.50 kU(A)/L      Very High  Please note: In general, low IgE antibody levels indicate a low probability of clinical disease, whereas high antibody levels to an allergen show good correlation with clinical disease.   Allergen [place] IgE   Result Value Ref Range    [place] IgE 0.52 (H) <0.10 KU(A)/L      Comment:      Interpretation: Low    Narrative    Analyte Specific Reagents (ASRs) are used in many laboratory tests necessary for standard medical care and generally do not require FDA approval.  This test was developed and its performance characteristics determined by [facility].  It has not been cleared or approved by the [place] Food and Drug Administration.    ImmunoCAP Specific IgE Blood Test Quantitative Scoring  <0.10 kU(A)/L        Absent/undetectable  0.10-0.69 kU(A)/L    Low  0.70-3.49 kU(A)/L    Moderate  3.50-17.50 kU(A)/L   High  >17.50 kU(A)/L      Very High  Please note: In general, low IgE antibody levels indicate a low probability of clinical disease, whereas high antibody levels to an allergen show good correlation with clinical disease.   Allergen oak white IgE   Result Value Ref Range    [place] (White) IgE 7.75 (H) <0.10 KU(A)/L      Comment:      Interpretation: High    Narrative    ImmunoCAP Specific IgE Blood Test Quantitative Scoring  <0.10 kU(A)/L        Absent/undetectable  0.10-0.69 kU(A)/L    Low  0.70-3.49 kU(A)/L    Moderate  3.50-17.50 kU(A)/L   High  >17.50 kU(A)/L      Very High  Please note: In general, low IgE antibody levels indicate a low probability of clinical disease, whereas high antibody levels to an allergen show good correlation with clinical disease.   Allergen maple box elder IgE   Result Value Ref Range    Maple Tree IgE 1.02 (H) <0.10 KU(A)/L      Comment:      Interpretation: Moderate    Narrative    ImmunoCAP Specific IgE Blood Test Quantitative Scoring  <0.10 kU(A)/L         Absent/undetectable  0.10-0.69 kU(A)/L    Low  0.70-3.49 kU(A)/L    Moderate  3.50-17.50 kU(A)/L   High  >17.50 kU(A)/L      Very High  Please note: In general, low IgE antibody levels indicate a low probability of clinical disease, whereas high antibody levels to an allergen show good correlation with clinical disease.   Allergen elm IgE   Result Value Ref Range    Elm Tree IgE 2.47 (H) <0.10 KU(A)/L      Comment:      Interpretation: Moderate    Narrative    ImmunoCAP Specific IgE Blood Test Quantitative Scoring  <0.10 kU(A)/L        Absent/undetectable  0.10-0.69 kU(A)/L    Low  0.70-3.49 kU(A)/L    Moderate  3.50-17.50 kU(A)/L   High  >17.50 kU(A)/L      Very High  Please note: In general, low IgE antibody levels indicate a low probability of clinical disease, whereas high antibody levels to an allergen show good correlation with clinical disease.   Allergen [place] IgE   Result Value Ref Range    [place] IgE 0.36 (H) <0.10 KU(A)/L      Comment:      Interpretation: Low    Narrative    ImmunoCAP Specific IgE Blood Test Quantitative Scoring  <0.10 kU(A)/L        Absent/undetectable  0.10-0.69 kU(A)/L    Low  0.70-3.49 kU(A)/L    Moderate  3.50-17.50 kU(A)/L   High  >17.50 kU(A)/L      Very High  Please note: In general, low IgE antibody levels indicate a low probability of clinical disease, whereas high antibody levels to an allergen show good correlation with clinical disease.   Allergen [place] IgE   Result Value Ref Range    [place], Tree IgE 0.50 (H) <0.10 KU(A)/L      Comment:      Interpretation: Low    Narrative    ImmunoCAP Specific IgE Blood Test Quantitative Scoring  <0.10 kU(A)/L        Absent/undetectable  0.10-0.69 kU(A)/L    Low  0.70-3.49 kU(A)/L    Moderate  3.50-17.50 kU(A)/L   High  >17.50 kU(A)/L      Very High  Please note: In general, low IgE antibody levels indicate a low probability of clinical disease, whereas high antibody levels to an allergen show good correlation with clinical  disease.   Allergen ruddy white IgE   Result Value Ref Range    White Rud[place], Tree IgE 1.84 (H) <0.10 KU(A)/L      Comment:      Interpretation: Moderate    Narrative    ImmunoCAP Specific IgE Blood Test Quantitative Scoring  <0.10 kU(A)/L        Absent/undetectable  0.10-0.69 kU(A)/L    Low  0.70-3.49 kU(A)/L    Moderate  3.50-17.50 kU(A)/L   High  >17.50 kU(A)/L      Very High  Please note: In general, low IgE antibody levels indicate a low probability of clinical disease, whereas high antibody levels to an allergen show good correlation with clinical disease.   Allergen penicillium notatum IgE   Result Value Ref Range    Penicillium notatum IgE <0.10 <0.10 KU(A)/L      Comment:      Interpretation: None Detected    Narrative    ImmunoCAP Specific IgE Blood Test Quantitative Scoring  <0.10 kU(A)/L        Absent/undetectable  0.10-0.69 kU(A)/L    Low  0.70-3.49 kU(A)/L    Moderate  3.50-17.50 kU(A)/L   High  >17.50 kU(A)/L      Very High  Please note: In general, low IgE antibody levels indicate a low probability of clinical disease, whereas high antibody levels to an allergen show good correlation with clinical disease.   Allergen Epicoccum purpurascens IgE   Result Value Ref Range    Epicoccum purpurascens IgE <0.10 <0.10 KU(A)/L      Comment:      Interpretation: None Detected    Narrative    ImmunoCAP Specific IgE Blood Test Quantitative Scoring  <0.10 kU(A)/L        Absent/undetectable  0.10-0.69 kU(A)/L    Low  0.70-3.49 kU(A)/L    Moderate  3.50-17.50 kU(A)/L   High  >17.50 kU(A)/L      Very High  Please note: In general, low IgE antibody levels indicate a low probability of clinical disease, whereas high antibody levels to an allergen show good correlation with clinical disease.   Allergen cladosporium herbarum IgE   Result Value Ref Range    Cladosporium herbarum IgE <0.10 <0.10 KU(A)/L      Comment:      Interpretation: None Detected    Narrative    ImmunoCAP Specific IgE Blood Test Quantitative Scoring  <0.10  kU(A)/L        Absent/undetectable  0.10-0.69 kU(A)/L    Low  0.70-3.49 kU(A)/L    Moderate  3.50-17.50 kU(A)/L   High  >17.50 kU(A)/L      Very High  Please note: In general, low IgE antibody levels indicate a low probability of clinical disease, whereas high antibody levels to an allergen show good correlation with clinical disease.   Allergen aspergillus fumigatus IgE   Result Value Ref Range    Aspergillis fumigatus IgE <0.10 <0.10 KU(A)/L      Comment:      Interpretation: None Detected    Narrative    ImmunoCAP Specific IgE Blood Test Quantitative Scoring  <0.10 kU(A)/L        Absent/undetectable  0.10-0.69 kU(A)/L    Low  0.70-3.49 kU(A)/L    Moderate  3.50-17.50 kU(A)/L   High  >17.50 kU(A)/L      Very High  Please note: In general, low IgE antibody levels indicate a low probability of clinical disease, whereas high antibody levels to an allergen show good correlation with clinical disease.   Allergen alternaria alternata IgE   Result Value Ref Range    Alternaria alternata, Mold IgE 0.26 (H) <0.10 KU(A)/L      Comment:      Interpretation: Low    Narrative    ImmunoCAP Specific IgE Blood Test Quantitative Scoring  <0.10 kU(A)/L        Absent/undetectable  0.10-0.69 kU(A)/L    Low  0.70-3.49 kU(A)/L    Moderate  3.50-17.50 kU(A)/L   High  >17.50 kU(A)/L      Very High  Please note: In general, low IgE antibody levels indicate a low probability of clinical disease, whereas high antibody levels to an allergen show good correlation with clinical disease.   Allergen D pteronyssinus IgE   Result Value Ref Range    Dermatophagoide pteronyssinus IgE 3.04 (H) <0.10 KU(A)/L      Comment:      Interpretation: Moderate    Narrative    ImmunoCAP Specific IgE Blood Test Quantitative Scoring  <0.10 kU(A)/L        Absent/undetectable  0.10-0.69 kU(A)/L    Low  0.70-3.49 kU(A)/L    Moderate  3.50-17.50 kU(A)/L   High  >17.50 kU(A)/L      Very High  Please note: In general, low IgE antibody levels indicate a low probability  of clinical disease, whereas high antibody levels to an allergen show good correlation with clinical disease.   Allergen D farinae IgE   Result Value Ref Range    Dermatophagoides farinae IgE 6.32 (H) <0.10 KU(A)/L      Comment:      Interpretation: High    Narrative    ImmunoCAP Specific IgE Blood Test Quantitative Scoring  <0.10 kU(A)/L        Absent/undetectable  0.10-0.69 kU(A)/L    Low  0.70-3.49 kU(A)/L    Moderate  3.50-17.50 kU(A)/L   High  >17.50 kU(A)/L      Very High  Please note: In general, low IgE antibody levels indicate a low probability of clinical disease, whereas high antibody levels to an allergen show good correlation with clinical disease.   Allergen shady IgE   Result Value Ref Range    Shady Grass IgE 20.80 (H) <0.10 KU(A)/L      Comment:      Interpretation: Very High    Narrative    ImmunoCAP Specific IgE Blood Test Quantitative Scoring  <0.10 kU(A)/L        Absent/undetectable  0.10-0.69 kU(A)/L    Low  0.70-3.49 kU(A)/L    Moderate  3.50-17.50 kU(A)/L   High  >17.50 kU(A)/L      Very High  Please note: In general, low IgE antibody levels indicate a low probability of clinical disease, whereas high antibody levels to an allergen show good correlation with clinical disease.   Allergen orchard grass IgE   Result Value Ref Range    [place] (Orchard Grass) IgE 22.50 (H) <0.10 KU(A)/L      Comment:      Interpretation: Very High    Narrative    ImmunoCAP Specific IgE Blood Test Quantitative Scoring  <0.10 kU(A)/L        Absent/undetectable  0.10-0.69 kU(A)/L    Low  0.70-3.49 kU(A)/L    Moderate  3.50-17.50 kU(A)/L   High  >17.50 kU(A)/L      Very High  Please note: In general, low IgE antibody levels indicate a low probability of clinical disease, whereas high antibody levels to an allergen show good correlation with clinical disease.   Allergen Eliud grass IgE   Result Value Ref Range    E[place] Grass IgE 0.84 (H) <0.10 KU(A)/L      Comment:      Interpretation: Moderate    Narrative     ImmunoCAP Specific IgE Blood Test Quantitative Scoring  <0.10 kU(A)/L        Absent/undetectable  0.10-0.69 kU(A)/L    Low  0.70-3.49 kU(A)/L    Moderate  3.50-17.50 kU(A)/L   High  >17.50 kU(A)/L      Very High  Please note: In general, low IgE antibody levels indicate a low probability of clinical disease, whereas high antibody levels to an allergen show good correlation with clinical disease.   Allergen dog epithelium IgE   Result Value Ref Range    Dog Dander IgE 0.69 (H) <0.10 KU(A)/L      Comment:      Interpretation: Low    Narrative    ImmunoCAP Specific IgE Blood Test Quantitative Scoring  <0.10 kU(A)/L        Absent/undetectable  0.10-0.69 kU(A)/L    Low  0.70-3.49 kU(A)/L    Moderate  3.50-17.50 kU(A)/L   High  >17.50 kU(A)/L      Very High  Please note: In general, low IgE antibody levels indicate a low probability of clinical disease, whereas high antibody levels to an allergen show good correlation with clinical disease.   Allergen cat epithellium IgE   Result Value Ref Range    Cat Dander IgE 2.45 (H) <0.10 KU(A)/L      Comment:      Interpretation: Moderate    Narrative    ImmunoCAP Specific IgE Blood Test Quantitative Scoring  <0.10 kU(A)/L        Absent/undetectable  0.10-0.69 kU(A)/L    Low  0.70-3.49 kU(A)/L    Moderate  3.50-17.50 kU(A)/L   High  >17.50 kU(A)/L      Very High  Please note: In general, low IgE antibody levels indicate a low probability of clinical disease, whereas high antibody levels to an allergen show good correlation with clinical disease.   CBC with platelets and differential   Result Value Ref Range    WBC Count 5.6 4.0 - 11.0 10e3/uL    RBC Count 4.35 (L) 4.40 - 5.90 10e6/uL    Hemoglobin 14.3 13.3 - 17.7 g/dL    Hematocrit 43.2 40.0 - 53.0 %    MCV 99 78 - 100 fL    MCH 32.9 26.5 - 33.0 pg    MCHC 33.1 31.5 - 36.5 g/dL    RDW 12.9 10.0 - 15.0 %    Platelet Count 277 150 - 450 10e3/uL    % Neutrophils 64 %    % Lymphocytes 20 %    % Monocytes 12 %    % Eosinophils 4 %     % Basophils 1 %    Absolute Neutrophils 3.6 1.6 - 8.3 10e3/uL    Absolute Lymphocytes 1.1 0.8 - 5.3 10e3/uL    Absolute Monocytes 0.7 0.0 - 1.3 10e3/uL    Absolute Eosinophils 0.2 0.0 - 0.7 10e3/uL    Absolute Basophils 0.0 0.0 - 0.2 10e3/uL       If you have any questions or concerns, please call the clinic at the number listed above.       Sincerely,      Troy Lockhart MD

## 2023-03-23 NOTE — LETTER
April 5, 2023      Roni Martin  [address]  [place] [address]        Dear ,    We are writing to inform you of your test results.    CBC with differential is within normal limits.  Absolute eosinophil count 200.   Elevated total serum IgE, 1305 (0-114) which is not uncommon for the patients with allergic rhinitis, asthma, food allergies, and/or eczema.   Serum IgE for regional aeroallergen panel with sensitivity to cat,dust mites, grass pollen, weed pollen, and tree pollen.   Mild sensitivity to dog and Alternaria mold noted.   - Recommend avoidance measures.   - No changes in the recommended treatment plan.   If symptoms persist despite medications and allergen avoidance, or if medications are not tolerated, allergen immunotherapy (allergy shots) is recommended.     If you have any questions or concerns, please call the clinic at the number listed above.       Sincerely,      Troy Lockhart MD

## 2023-03-25 LAB
A FUMIGATUS IGE QN: <0.1 KU(A)/L
C HERBARUM IGE QN: <0.1 KU(A)/L
CALIF WALNUT POLN IGE QN: 2.94 KU(A)/L
CAT DANDER IGG QN: 2.45 KU(A)/L
COCKSFOOT IGE QN: 22.5 KU(A)/L
COMMON RAGWEED IGE QN: 0.87 KU(A)/L
D FARINAE IGE QN: 6.32 KU(A)/L
D PTERONYSS IGE QN: 3.04 KU(A)/L
DOG DANDER+EPITH IGE QN: 0.69 KU(A)/L
E PURPURASCENS IGE QN: <0.1 KU(A)/L
EAST WHITE PINE IGE QN: <0.1 KU(A)/L
ENGL PLANTAIN IGE QN: 0.53 KU(A)/L
FIREBUSH IGE QN: 0.26 KU(A)/L
GIANT RAGWEED IGE QN: 1.39 KU(A)/L
GOOSEFOOT IGE QN: 1.48 KU(A)/L
IGE SERPL-ACNC: 1305 KU/L (ref 0–114)
JOHNSON GRASS IGE QN: 0.84 KU(A)/L
MAPLE IGE QN: 1.02 KU(A)/L
MUGWORT IGE QN: 1.2 KU(A)/L
NETTLE IGE QN: 0.61 KU(A)/L
P NOTATUM IGE QN: <0.1 KU(A)/L
RED MULBERRY IGE QN: 0.52 KU(A)/L
SALTWORT IGE QN: 0.53 KU(A)/L
SHEEP SORREL IGE QN: 1.18 KU(A)/L
SILVER BIRCH IGE QN: 13.8 KU(A)/L
TIMOTHY IGE QN: 20.8 KU(A)/L
WHITE ELM IGE QN: 2.47 KU(A)/L
WHITE MULBERRY IGE QN: 0.17 KU(A)/L
WHITE OAK IGE QN: 7.75 KU(A)/L
WORMWOOD IGE QN: 1.74 KU(A)/L

## 2023-03-28 LAB
A ALTERNATA IGE QN: 0.26 KU(A)/L
CEDAR IGE QN: 0.5 KU(A)/L
COTTONWOOD IGE QN: 0.36 KU(A)/L
WHITE ASH IGE QN: 1.84 KU(A)/L

## 2023-04-03 NOTE — RESULT ENCOUNTER NOTE
Please call the patient:    CBC with differential is within normal limits.  Absolute eosinophil count 200.  Elevated total serum IgE, 1305 (0-114) which is not uncommon for the patients with allergic rhinitis, asthma, food allergies, and/or eczema.  Serum IgE for regional aeroallergen panel with sensitivity to cat,dust mites, grass pollen, weed pollen, and tree pollen.  Mild sensitivity to dog and Alternaria mold noted.  - Recommend avoidance measures.  - No changes in the recommended treatment plan.  If symptoms persist despite medications and allergen avoidance, or if medications are not tolerated, allergen immunotherapy (allergy shots) is recommended.

## 2023-06-02 DIAGNOSIS — I10 BENIGN ESSENTIAL HYPERTENSION: ICD-10-CM

## 2023-06-02 DIAGNOSIS — M10.9 GOUT, UNSPECIFIED CAUSE, UNSPECIFIED CHRONICITY, UNSPECIFIED SITE: ICD-10-CM

## 2023-06-06 RX ORDER — ALLOPURINOL 300 MG/1
TABLET ORAL
Qty: 90 TABLET | Refills: 2 | Status: SHIPPED | OUTPATIENT
Start: 2023-06-06 | End: 2024-03-05

## 2023-06-06 RX ORDER — LISINOPRIL 10 MG/1
TABLET ORAL
Qty: 90 TABLET | Refills: 2 | Status: SHIPPED | OUTPATIENT
Start: 2023-06-06 | End: 2024-03-05

## 2023-06-06 RX ORDER — DILTIAZEM HYDROCHLORIDE 360 MG/1
CAPSULE, EXTENDED RELEASE ORAL
Qty: 90 CAPSULE | Refills: 0 | Status: SHIPPED | OUTPATIENT
Start: 2023-06-06 | End: 2023-08-24

## 2023-06-06 NOTE — TELEPHONE ENCOUNTER
Routing refill request to provider for review/approval because:  Labs not current:  Creat, potassium  Patient needs to be seen because:  Due for HTN F/U  ARIELLA Rausch RN

## 2023-08-24 DIAGNOSIS — I10 BENIGN ESSENTIAL HYPERTENSION: ICD-10-CM

## 2023-08-24 RX ORDER — DILTIAZEM HYDROCHLORIDE 360 MG/1
CAPSULE, EXTENDED RELEASE ORAL
Qty: 90 CAPSULE | Refills: 0 | Status: SHIPPED | OUTPATIENT
Start: 2023-08-24 | End: 2023-12-01

## 2023-10-31 DIAGNOSIS — J30.2 SEASONAL ALLERGIC RHINITIS, UNSPECIFIED TRIGGER: ICD-10-CM

## 2023-10-31 RX ORDER — FLUTICASONE PROPIONATE 50 MCG
SPRAY, SUSPENSION (ML) NASAL
Qty: 48 G | Refills: 0 | Status: SHIPPED | OUTPATIENT
Start: 2023-10-31 | End: 2024-01-25

## 2023-10-31 NOTE — TELEPHONE ENCOUNTER
Prescription approved per [facility] Refill Protocol     Pati Chahal     RN MSN

## 2023-12-01 DIAGNOSIS — I10 BENIGN ESSENTIAL HYPERTENSION: ICD-10-CM

## 2023-12-01 RX ORDER — DILTIAZEM HYDROCHLORIDE 360 MG/1
CAPSULE, EXTENDED RELEASE ORAL
Qty: 90 CAPSULE | Refills: 0 | Status: SHIPPED | OUTPATIENT
Start: 2023-12-01 | End: 2024-03-05

## 2024-01-23 DIAGNOSIS — J30.2 SEASONAL ALLERGIC RHINITIS, UNSPECIFIED TRIGGER: ICD-10-CM

## 2024-01-23 RX ORDER — LORATADINE AND PSEUDOEPHEDRINE SULFATE 10; 240 MG/1; MG/1
TABLET, FILM COATED, EXTENDED RELEASE ORAL
Qty: 30 TABLET | Refills: 0 | Status: SHIPPED | OUTPATIENT
Start: 2024-01-23 | End: 2024-03-08

## 2024-01-24 DIAGNOSIS — J30.2 SEASONAL ALLERGIC RHINITIS, UNSPECIFIED TRIGGER: ICD-10-CM

## 2024-01-25 RX ORDER — FLUTICASONE PROPIONATE 50 MCG
SPRAY, SUSPENSION (ML) NASAL
Qty: 48 G | Refills: 0 | Status: SHIPPED | OUTPATIENT
Start: 2024-01-25 | End: 2024-03-12

## 2024-03-04 DIAGNOSIS — I10 BENIGN ESSENTIAL HYPERTENSION: ICD-10-CM

## 2024-03-04 DIAGNOSIS — M10.9 GOUT, UNSPECIFIED CAUSE, UNSPECIFIED CHRONICITY, UNSPECIFIED SITE: ICD-10-CM

## 2024-03-04 DIAGNOSIS — E78.5 HYPERLIPIDEMIA LDL GOAL <130: ICD-10-CM

## 2024-03-05 RX ORDER — LISINOPRIL 10 MG/1
TABLET ORAL
Qty: 30 TABLET | Refills: 0 | Status: SHIPPED | OUTPATIENT
Start: 2024-03-05 | End: 2024-03-08

## 2024-03-05 RX ORDER — DILTIAZEM HYDROCHLORIDE 360 MG/1
CAPSULE, EXTENDED RELEASE ORAL
Qty: 30 CAPSULE | Refills: 0 | Status: SHIPPED | OUTPATIENT
Start: 2024-03-05 | End: 2024-03-08

## 2024-03-05 RX ORDER — ALLOPURINOL 300 MG/1
TABLET ORAL
Qty: 30 TABLET | Refills: 0 | Status: SHIPPED | OUTPATIENT
Start: 2024-03-05 | End: 2024-03-08

## 2024-03-05 RX ORDER — ATORVASTATIN CALCIUM 10 MG/1
10 TABLET, FILM COATED ORAL DAILY
Qty: 30 TABLET | Refills: 0 | Status: SHIPPED | OUTPATIENT
Start: 2024-03-05 | End: 2024-03-08

## 2024-03-08 ENCOUNTER — OFFICE VISIT (OUTPATIENT)
Dept: FAMILY MEDICINE | Facility: CLINIC | Age: 59
End: 2024-03-08
Payer: COMMERCIAL

## 2024-03-08 VITALS
RESPIRATION RATE: 22 BRPM | OXYGEN SATURATION: 97 % | HEART RATE: 92 BPM | DIASTOLIC BLOOD PRESSURE: 92 MMHG | HEIGHT: 72 IN | BODY MASS INDEX: 32.61 KG/M2 | WEIGHT: 240.8 LBS | SYSTOLIC BLOOD PRESSURE: 144 MMHG | TEMPERATURE: 97.5 F

## 2024-03-08 DIAGNOSIS — Z23 HIGH PRIORITY FOR 2019-NCOV VACCINE: ICD-10-CM

## 2024-03-08 DIAGNOSIS — E78.5 HYPERLIPIDEMIA LDL GOAL <130: ICD-10-CM

## 2024-03-08 DIAGNOSIS — I10 BENIGN ESSENTIAL HYPERTENSION: Primary | ICD-10-CM

## 2024-03-08 DIAGNOSIS — Z12.5 SCREENING FOR PROSTATE CANCER: ICD-10-CM

## 2024-03-08 DIAGNOSIS — J30.2 SEASONAL ALLERGIC RHINITIS, UNSPECIFIED TRIGGER: ICD-10-CM

## 2024-03-08 DIAGNOSIS — M10.9 GOUT, UNSPECIFIED CAUSE, UNSPECIFIED CHRONICITY, UNSPECIFIED SITE: ICD-10-CM

## 2024-03-08 LAB
ANION GAP SERPL CALCULATED.3IONS-SCNC: 11 MMOL/L (ref 7–15)
BUN SERPL-MCNC: 8.2 MG/DL (ref 8–23)
CALCIUM SERPL-MCNC: 10.1 MG/DL (ref 8.8–10.2)
CHLORIDE SERPL-SCNC: 104 MMOL/L (ref 98–107)
CHOLEST SERPL-MCNC: 207 MG/DL
CREAT SERPL-MCNC: 0.86 MG/DL (ref 0.67–1.17)
DEPRECATED HCO3 PLAS-SCNC: 26 MMOL/L (ref 22–29)
EGFRCR SERPLBLD CKD-EPI 2021: >90 ML/MIN/1.73M2
ERYTHROCYTE [DISTWIDTH] IN BLOOD BY AUTOMATED COUNT: 13.2 % (ref 10–15)
FASTING STATUS PATIENT QL REPORTED: ABNORMAL
GLUCOSE SERPL-MCNC: 96 MG/DL (ref 70–99)
HCT VFR BLD AUTO: 42.6 % (ref 40–53)
HDLC SERPL-MCNC: 84 MG/DL
HGB BLD-MCNC: 14.1 G/DL (ref 13.3–17.7)
LDLC SERPL CALC-MCNC: 96 MG/DL
MCH RBC QN AUTO: 32.8 PG (ref 26.5–33)
MCHC RBC AUTO-ENTMCNC: 33.1 G/DL (ref 31.5–36.5)
MCV RBC AUTO: 99 FL (ref 78–100)
NONHDLC SERPL-MCNC: 123 MG/DL
PLATELET # BLD AUTO: 245 10E3/UL (ref 150–450)
POTASSIUM SERPL-SCNC: 4.3 MMOL/L (ref 3.4–5.3)
PSA SERPL DL<=0.01 NG/ML-MCNC: 2.47 NG/ML (ref 0–4.5)
RBC # BLD AUTO: 4.3 10E6/UL (ref 4.4–5.9)
SODIUM SERPL-SCNC: 141 MMOL/L (ref 135–145)
TRIGL SERPL-MCNC: 136 MG/DL
WBC # BLD AUTO: 6.6 10E3/UL (ref 4–11)

## 2024-03-08 PROCEDURE — G0103 PSA SCREENING: HCPCS | Performed by: FAMILY MEDICINE

## 2024-03-08 PROCEDURE — 36415 COLL VENOUS BLD VENIPUNCTURE: CPT | Performed by: FAMILY MEDICINE

## 2024-03-08 PROCEDURE — 80048 BASIC METABOLIC PNL TOTAL CA: CPT | Performed by: FAMILY MEDICINE

## 2024-03-08 PROCEDURE — 85027 COMPLETE CBC AUTOMATED: CPT | Performed by: FAMILY MEDICINE

## 2024-03-08 PROCEDURE — 90686 IIV4 VACC NO PRSV 0.5 ML IM: CPT | Performed by: FAMILY MEDICINE

## 2024-03-08 PROCEDURE — 90471 IMMUNIZATION ADMIN: CPT | Performed by: FAMILY MEDICINE

## 2024-03-08 PROCEDURE — 91320 SARSCV2 VAC 30MCG TRS-SUC IM: CPT | Performed by: FAMILY MEDICINE

## 2024-03-08 PROCEDURE — 90480 ADMN SARSCOV2 VAC 1/ONLY CMP: CPT | Performed by: FAMILY MEDICINE

## 2024-03-08 PROCEDURE — 99214 OFFICE O/P EST MOD 30 MIN: CPT | Mod: 25 | Performed by: FAMILY MEDICINE

## 2024-03-08 PROCEDURE — 80061 LIPID PANEL: CPT | Performed by: FAMILY MEDICINE

## 2024-03-08 RX ORDER — ALLOPURINOL 300 MG/1
1 TABLET ORAL DAILY
Qty: 90 TABLET | Refills: 3 | Status: SHIPPED | OUTPATIENT
Start: 2024-03-08

## 2024-03-08 RX ORDER — LISINOPRIL 10 MG/1
10 TABLET ORAL DAILY
Qty: 90 TABLET | Refills: 3 | Status: SHIPPED | OUTPATIENT
Start: 2024-03-08

## 2024-03-08 RX ORDER — IPRATROPIUM BROMIDE 42 UG/1
2 SPRAY, METERED NASAL 3 TIMES DAILY
Qty: 15 ML | Refills: 3 | Status: SHIPPED | OUTPATIENT
Start: 2024-03-08 | End: 2024-03-12

## 2024-03-08 RX ORDER — LORATADINE 10 MG/1
10 TABLET, ORALLY DISINTEGRATING ORAL DAILY
Qty: 90 TABLET | Refills: 3 | Status: SHIPPED | OUTPATIENT
Start: 2024-03-08

## 2024-03-08 RX ORDER — DILTIAZEM HYDROCHLORIDE 360 MG/1
360 CAPSULE, EXTENDED RELEASE ORAL DAILY
Qty: 90 CAPSULE | Refills: 3 | Status: SHIPPED | OUTPATIENT
Start: 2024-03-08

## 2024-03-08 RX ORDER — ATORVASTATIN CALCIUM 10 MG/1
10 TABLET, FILM COATED ORAL DAILY
Qty: 90 TABLET | Refills: 3 | Status: SHIPPED | OUTPATIENT
Start: 2024-03-08

## 2024-03-08 RX ORDER — LORATADINE 10 MG/1
10 TABLET, ORALLY DISINTEGRATING ORAL DAILY
COMMUNITY
End: 2024-03-08

## 2024-03-08 RX ORDER — LORATADINE AND PSEUDOEPHEDRINE SULFATE 10; 240 MG/1; MG/1
TABLET, FILM COATED, EXTENDED RELEASE ORAL
Qty: 30 TABLET | Refills: 0 | Status: SHIPPED | OUTPATIENT
Start: 2024-03-08

## 2024-03-08 ASSESSMENT — PAIN SCALES - GENERAL: PAINLEVEL: NO PAIN (0)

## 2024-03-08 NOTE — PROGRESS NOTES
Assessment & Plan     Benign essential hypertension  Blood pressure borderline elevated.  Lisinopril and diltiazem refilled.  Stressed on regular exercise, healthy diet and weight loss.  Follow-up with RN for blood pressure check in 2 to 4 weeks.  - diltiazem ER (TIADYLT ER) 360 MG 24 hr ER beaded capsule; Take 1 capsule (360 mg) by mouth daily  - lisinopril (ZESTRIL) 10 MG tablet; Take 1 tablet (10 mg) by mouth daily  - CBC with platelets; Future  - Basic metabolic panel  (Ca, Cl, CO2, Creat, Gluc, K, Na, BUN); Future  - Basic metabolic panel  (Ca, Cl, CO2, Creat, Gluc, K, Na, BUN)  - CBC with platelets    Gout, unspecified cause, unspecified chronicity, unspecified site  Allopurinol refilled  - allopurinol (ZYLOPRIM) 300 MG tablet; Take 1 tablet (300 mg) by mouth daily    Hyperlipidemia LDL goal <130  Recommended regular exercise, healthy diet, weight loss and Lipitor refilled  - Lipid panel reflex to direct LDL Non-fasting; Future  - atorvastatin (LIPITOR) 10 MG tablet; Take 1 tablet (10 mg) by mouth daily  - Lipid panel; Future  - Lipid panel reflex to direct LDL Non-fasting    High priority for 2019-nCoV vaccine  Influenza and COVID-19 vaccine administered in office today    Seasonal allergic rhinitis, unspecified trigger  Known to have seasonal allergies, symptoms uncontrolled.  Atrovent nasal spray added and recommended to continue Claritin, Flonase.  Suggested to schedule appointment with allergist as well  - loratadine (CLARITIN REDITABS) 10 MG ODT; Take 1 tablet (10 mg) by mouth daily  - Adult Allergy/Asthma  Referral; Future  - ipratropium (ATROVENT) 0.06 % nasal spray; Spray 2 sprays into both nostrils 3 times daily    Screening for prostate cancer  - PSA, screen; Future  - PSA, screen      BMI  Estimated body mass index is 32.66 kg/m  as calculated from the following:    Height as of this encounter: 1.829 m (6').    Weight as of this encounter: 109.2 kg (240 lb 12.8 oz).   Weight management  plan: Discussed healthy diet and exercise guidelines      Michael Tamayo is a 58 year old, presenting for the following health issues:  Recheck Medication and Imm/Inj (COVID-19 VACCINE)        3/8/2024    10:05 AM   Additional Questions   Roomed by Alicia ANAYA CMA     History of Present Illness       Reason for visit:  Renew pills    He eats 0-1 servings of fruits and vegetables daily.He consumes 1 sweetened beverage(s) daily.He exercises with enough effort to increase his heart rate 10 to 19 minutes per day.  He exercises with enough effort to increase his heart rate 4 days per week.   He is taking medications regularly.    -Patient really would like a refill of his Claritin as his allergies are really flaring up right now.  Unsure if it is a cold or allergies, symptoms have been ongoing for about 3 weeks.  He is having runny nose and congestion that is so bad at night that he can't sleep. Just had all medications renewed, except for the Claritin.    Hyperlipidemia Follow-Up  Are you regularly taking any medication or supplement to lower your cholesterol?   Yes- Lipitor 10 mg  Are you having muscle aches or other side effects that you think could be caused by your cholesterol lowering medication?  No    Hypertension Follow-up  Do you check your blood pressure regularly outside of the clinic? No   Are you following a low salt diet? No  Are your blood pressures ever more than 140 on the top number (systolic) OR more   than 90 on the bottom number (diastolic), for example 140/90? No    Review of Systems  Constitutional, neuro, ENT, endocrine, pulmonary, cardiac, gastrointestinal, genitourinary, musculoskeletal, integument and psychiatric systems are negative, except as otherwise noted.      Objective    BP (!) 144/92 (BP Location: Right arm, Patient Position: Sitting, Cuff Size: Adult Large)   Pulse 92   Temp 97.5  F (36.4  C) (Tympanic)   Resp 22   Ht 1.829 m (6')   Wt 109.2 kg (240 lb 12.8 oz)   SpO2 97%    BMI 32.66 kg/m    Body mass index is 32.66 kg/m .  Physical Exam   GENERAL: alert and no distress  EYES: Eyes grossly normal to inspection, PERRL and conjunctivae and sclerae normal  HENT: normal cephalic/atraumatic, right ear: clear effusion, left ear: normal: no effusions, no erythema, normal landmarks, nasal mucosa edematous , oropharynx clear, and oral mucous membranes moist  NECK: no adenopathy, no asymmetry, masses, or scars  RESP: lungs clear to auscultation - no rales, rhonchi or wheezes  CV: regular rate and rhythm, normal S1 S2, no S3 or S4, no murmur, click or rub, no peripheral edema  ABDOMEN: soft, nontender, no hepatosplenomegaly, no masses  MS: no gross musculoskeletal defects noted, no edema  SKIN: no suspicious lesions or rashes  NEURO: Normal strength and tone, mentation intact and speech normal  PSYCH: mentation appears normal, affect normal/bright          Signed Electronically by: Vasyl Lieberman MD

## 2024-03-08 NOTE — LETTER
March 8, 2024      Roni Martin  [address]  [place] [address]        Dear ,    We are writing to inform you of your test results.      Lab results consistent mildly elevated cholesterol level otherwise unremarkable.     Will recommend to continue regular walks, healthy diet and current medication    Resulted Orders   PSA, screen   Result Value Ref Range    Prostate Specific Antigen Screen 2.47 0.00 - 4.50 ng/mL    Narrative    This result is obtained using the Roche Elecsys total PSA method on the randy [id number] immunoassay analyzer. Results obtained with different assay methods or kits cannot be used interchangeably.   Basic metabolic panel  (Ca, Cl, CO2, Creat, Gluc, K, Na, BUN)   Result Value Ref Range    Sodium 141 135 - 145 mmol/L      Comment:      Reference intervals for this test were updated on 09/26/2023 to more accurately reflect our healthy population. There may be differences in the flagging of prior results with similar values performed with this method. Interpretation of those prior results can be made in the context of the updated reference intervals.     Potassium 4.3 3.4 - 5.3 mmol/L    Chloride 104 98 - 107 mmol/L    Carbon Dioxide (CO2) 26 22 - 29 mmol/L    Anion Gap 11 7 - 15 mmol/L    Urea Nitrogen 8.2 8.0 - 23.0 mg/dL    Creatinine 0.86 0.67 - 1.17 mg/dL    GFR Estimate >90 >60 mL/min/1.73m2    Calcium 10.1 8.8 - 10.2 mg/dL    Glucose 96 70 - 99 mg/dL   CBC with platelets   Result Value Ref Range    WBC Count 6.6 4.0 - 11.0 10e3/uL    RBC Count 4.30 (L) 4.40 - 5.90 10e6/uL    Hemoglobin 14.1 13.3 - 17.7 g/dL    Hematocrit 42.6 40.0 - 53.0 %    MCV 99 78 - 100 fL    MCH 32.8 26.5 - 33.0 pg    MCHC 33.1 31.5 - 36.5 g/dL    RDW 13.2 10.0 - 15.0 %    Platelet Count 245 150 - 450 10e3/uL   Lipid panel reflex to direct LDL Non-fasting   Result Value Ref Range    Cholesterol 207 (H) <200 mg/dL    Triglycerides 136 <150 mg/dL    Direct Measure HDL 84 >=40 mg/dL    LDL Cholesterol Calculated  96 <=100 mg/dL    Non HDL Cholesterol 123 <130 mg/dL    Patient Fasting > 8hrs? Unknown     Narrative    Cholesterol  Desirable:  <200 mg/dL    Triglycerides  Normal:  Less than 150 mg/dL  Borderline High:  150-199 mg/dL  High:  200-499 mg/dL  Very High:  Greater than or equal to 500 mg/dL    Direct Measure HDL  Female:  Greater than or equal to 50 mg/dL   Male:  Greater than or equal to 40 mg/dL    LDL Cholesterol  Desirable:  <100mg/dL  Above Desirable:  100-129 mg/dL   Borderline High:  130-159 mg/dL   High:  160-189 mg/dL   Very High:  >= 190 mg/dL    Non HDL Cholesterol  Desirable:  130 mg/dL  Above Desirable:  130-159 mg/dL  Borderline High:  160-189 mg/dL  High:  190-219 mg/dL  Very High:  Greater than or equal to 220 mg/dL       If you have any questions or concerns, please call the clinic at the number listed above.       Sincerely,      Vasyl Lieberman MD

## 2024-03-12 ENCOUNTER — OFFICE VISIT (OUTPATIENT)
Dept: ALLERGY | Facility: OTHER | Age: 59
End: 2024-03-12
Attending: FAMILY MEDICINE
Payer: COMMERCIAL

## 2024-03-12 ENCOUNTER — TELEPHONE (OUTPATIENT)
Dept: ALLERGY | Facility: CLINIC | Age: 59
End: 2024-03-12

## 2024-03-12 VITALS
WEIGHT: 237.88 LBS | OXYGEN SATURATION: 98 % | DIASTOLIC BLOOD PRESSURE: 82 MMHG | SYSTOLIC BLOOD PRESSURE: 125 MMHG | HEART RATE: 83 BPM | BODY MASS INDEX: 32.26 KG/M2

## 2024-03-12 DIAGNOSIS — J30.89 ALLERGIC RHINITIS DUE TO DUST MITE: ICD-10-CM

## 2024-03-12 DIAGNOSIS — J30.1 SEASONAL ALLERGIC RHINITIS DUE TO POLLEN: ICD-10-CM

## 2024-03-12 DIAGNOSIS — H10.13 ALLERGIC CONJUNCTIVITIS OF BOTH EYES: ICD-10-CM

## 2024-03-12 DIAGNOSIS — R06.2 WHEEZING: ICD-10-CM

## 2024-03-12 DIAGNOSIS — J30.81 ALLERGIC RHINITIS DUE TO ANIMALS: Primary | ICD-10-CM

## 2024-03-12 DIAGNOSIS — J30.89 ALLERGIC RHINITIS CAUSED BY MOLD: ICD-10-CM

## 2024-03-12 DIAGNOSIS — J34.2 DNS (DEVIATED NASAL SEPTUM): ICD-10-CM

## 2024-03-12 DIAGNOSIS — J01.90 ACUTE SINUSITIS WITH SYMPTOMS > 10 DAYS: ICD-10-CM

## 2024-03-12 LAB
FEF 25/75: NORMAL
FEV-1: NORMAL
FEV1/FVC: NORMAL
FVC: NORMAL

## 2024-03-12 PROCEDURE — 94010 BREATHING CAPACITY TEST: CPT | Performed by: ALLERGY & IMMUNOLOGY

## 2024-03-12 PROCEDURE — 99214 OFFICE O/P EST MOD 30 MIN: CPT | Mod: 25 | Performed by: ALLERGY & IMMUNOLOGY

## 2024-03-12 RX ORDER — ALBUTEROL SULFATE AND BUDESONIDE 90; 80 UG/1; UG/1
2 AEROSOL, METERED RESPIRATORY (INHALATION) EVERY 4 HOURS PRN
Qty: 10.7 G | Refills: 3 | Status: SHIPPED | OUTPATIENT
Start: 2024-03-12 | End: 2024-03-15

## 2024-03-12 RX ORDER — ALBUTEROL SULFATE AND BUDESONIDE 90; 80 UG/1; UG/1
2 AEROSOL, METERED RESPIRATORY (INHALATION) EVERY 4 HOURS PRN
Qty: 10.7 G | Refills: 3 | Status: SHIPPED | OUTPATIENT
Start: 2024-03-12 | End: 2024-03-12

## 2024-03-12 RX ORDER — AZELASTINE 1 MG/ML
2 SPRAY, METERED NASAL 2 TIMES DAILY PRN
Qty: 30 ML | Refills: 3 | Status: SHIPPED | OUTPATIENT
Start: 2024-03-12

## 2024-03-12 RX ORDER — AZELASTINE HYDROCHLORIDE 0.5 MG/ML
1 SOLUTION/ DROPS OPHTHALMIC 2 TIMES DAILY
Qty: 6 ML | Refills: 3 | Status: SHIPPED | OUTPATIENT
Start: 2024-03-12

## 2024-03-12 RX ORDER — FLUTICASONE PROPIONATE 50 MCG
1-2 SPRAY, SUSPENSION (ML) NASAL DAILY
Qty: 48 G | Refills: 2 | Status: SHIPPED | OUTPATIENT
Start: 2024-03-12

## 2024-03-12 RX ORDER — AZITHROMYCIN 250 MG/1
TABLET, FILM COATED ORAL
Qty: 6 TABLET | Refills: 0 | Status: SHIPPED | OUTPATIENT
Start: 2024-03-12 | End: 2024-03-17

## 2024-03-12 NOTE — PATIENT INSTRUCTIONS
-Start AirSupra 2 puffs every 4 hours as needed for chest tightness/wheezing/shortness of breath/persistent cough.    -Start intranasal fluticasone (Flonase) 1-2 sprays in each nostril once daily.  -Start azelastine nasal spray, 2 sprays in each nostril twice daily as needed.   Optivar 1 drop in each eye twice daily as needed.     Start Z-Vernon.  See ENT.    Consider allergy shots.       Dr Lockhart [phone]    All visits for food challenges, venom allergy testing, and medication/drug challenges MUST be scheduled through the allergy clinic nurse. Please send a Opt[employer] message or call the allergy scheduling line and ask to speak with Dr Lockhart's team for scheduling these appointments. Appointments for these visits that are made through the schedulers or via [employer] may be cancelled or rescheduled.    Allergy Shot Room ([place]): [phone]    Pulmonary Function [phone]    Prescription Assistance  If you need assistance with your prescriptions (cost, coverage, etc) please contact: [facility] [phone]

## 2024-03-12 NOTE — TELEPHONE ENCOUNTER
Spoke with wife and sent Rx to a different pharmacy as requested.    Radha WHITT RN, [facility] 03/12/24 2:57 PM

## 2024-03-12 NOTE — LETTER
3/12/2024         RE: Roni Martin  [address]  [place] [address]        Dear Colleague,    Thank you for referring your patient, Roni Martin, to the [facility]. Please see a copy of my visit note below.    SUBJECTIVE:                                                                   Roni Martin presents today to our Allergy Clinic at [facility] for a follow up visit. He is a 58 year old male with a history of allergic rhinoconjunctivitis, deviated nasal septum, and possible asthma.    History of chronic rhinoconjunctivitis for decades.  Perennial pattern with Spring, Summer, and Fall exacerbations.   In March 2023, elevated total serum IgE, 1305 (0-114). Serum IgE for regional aeroallergen panel with sensitivity to cat,dust mites, grass pollen, weed pollen, and tree pollen. Mild sensitivity to dog and Alternaria mold noted.  History of chest pressure waking him up at night causing wheezing and coughing.  Would self resolve in 1 hour.  Previous cardiac workup was done and was normal, per patient.    Currently, he uses intranasal fluticasone 1 spray in each nostril once daily.  He doesn't recall using azelastine nasal spray.   He was recently prescribed ipratropium bromide. He hasn't picked it up. He takes loratadine 10 mg by mouth once daily.   He has yellow rhinorrhea, constant nasal congestion bilaterally, sinus pressure, postnasal drip, and itchy eyes.     He continue having wheezing at night about twice a night. Doesn't use albuterol inhaler. Denies symptoms during the day.   Denies smoking.       Patient Active Problem List   Diagnosis     Obesity     Benign essential hypertension     Elevated fasting blood sugar     Hyperlipidemia LDL goal <130       Past Medical History:   Diagnosis Date     Hypertension       Problem (# of Occurrences) Relation (Name,Age of Onset)    Parkinsonism (1) Mother    Diabetes (1) Father    Hypertension (1) Father            Negative family history of: Hyperlipidemia, Heart Disease          Past Surgical History:   Procedure Laterality Date     COLONOSCOPY N/A 07/18/2022    Procedure: COLONOSCOPY with polypectomy;  Surgeon: Darin Willoughby MD;  Location: [facility]     IR THORACENTESIS  11/25/2019     THORACENTESIS Left 11/25/2019    Procedure: THORACENTESIS;  Surgeon: Gal Will PA-C;  Location:  OR     Social History     Socioeconomic History     Marital status:      Spouse name: None     Number of children: None     Years of education: None     Highest education level: None   Occupational History     Occupation: [employer], [employer]   Tobacco Use     Smoking status: Never     Smokeless tobacco: Never   Vaping Use     Vaping Use: Never used   Substance and Sexual Activity     Alcohol use: Yes     Comment: 3 times per week     Drug use: No     Sexual activity: Yes     Partners: Female   Social History Narrative    March 12, 2024        ENVIRONMENTAL HISTORY: [place] is heated with a forced air. They do have central air conditioning. The patient's bedroom is furnished with carpeting in bedroom.  Pets inside the house include 1 cat(s). There is no history of cockroach or mice infestation. There is/are 0 smokers in the house.  The house does not have a damp basement.      Social Determinants of Health     Interpersonal Safety: Low Risk  (3/8/2024)    Interpersonal Safety      Do you feel physically and emotionally safe where you currently live?: Yes      Within the past 12 months, have you been hit, slapped, kicked or otherwise physically hurt by someone?: No      Within the past 12 months, have you been humiliated or emotionally abused in other ways by your partner or ex-partner?: No           Review of Systems        Current Outpatient Medications:      Albuterol-Budesonide (AIRSUPRA) 90-80 MCG/ACT AERO, Inhale 2 Inhalations into the lungs every 4 hours as needed  (Wheezing, chest tightness, shortness of breath, or persistent cough), Disp: 10.7 g, Rfl: 3     azelastine (ASTELIN) 0.1 % nasal spray, Spray 2 sprays into both nostrils 2 times daily as needed for rhinitis, Disp: 30 mL, Rfl: 3     azelastine (OPTIVAR) 0.05 % ophthalmic solution, Apply 1 drop to eye 2 times daily, Disp: 6 mL, Rfl: 3     azithromycin (ZITHROMAX) 250 MG tablet, Take 2 tablets (500 mg) by mouth daily for 1 day, THEN 1 tablet (250 mg) daily for 4 days., Disp: 6 tablet, Rfl: 0     fluticasone (FLONASE) 50 MCG/ACT nasal spray, Spray 1-2 sprays into both nostrils daily, Disp: 48 g, Rfl: 2     allopurinol (ZYLOPRIM) 300 MG tablet, Take 1 tablet (300 mg) by mouth daily, Disp: 90 tablet, Rfl: 3     atorvastatin (LIPITOR) 10 MG tablet, Take 1 tablet (10 mg) by mouth daily, Disp: 90 tablet, Rfl: 3     azelastine (OPTIVAR) 0.05 % ophthalmic solution, Apply 1 drop to eye 2 times daily as needed (itchy/water eyes) (Patient not taking: Reported on 3/8/2024), Disp: 6 mL, Rfl: 3     diltiazem ER (TIADYLT ER) 360 MG 24 hr ER beaded capsule, Take 1 capsule (360 mg) by mouth daily, Disp: 90 capsule, Rfl: 3     ipratropium (ATROVENT) 0.06 % nasal spray, Spray 2 sprays into both nostrils 3 times daily, Disp: 15 mL, Rfl: 3     lisinopril (ZESTRIL) 10 MG tablet, Take 1 tablet (10 mg) by mouth daily, Disp: 90 tablet, Rfl: 3     loratadine (CLARITIN REDITABS) 10 MG ODT, Take 1 tablet (10 mg) by mouth daily (Patient not taking: Reported on 3/12/2024), Disp: 90 tablet, Rfl: 3     loratadine-pseudoePHEDrine (ALLERGY RELIEF/NASAL DECONGEST)  MG 24 hr tablet, TAKE 1 TABLET BY MOUTH ONCE DAILY AS NEEDED FOR ALLERGIES, Disp: 30 tablet, Rfl: 0  Immunization History   Administered Date(s) Administered     COVID-19 12+ (2023-24) (Pfizer) 03/08/2024     COVID-19 Monovalent 18+ (Moderna) 04/23/2021, 05/21/2021     COVID-19 Monovalent Booster 18+ (Moderna) 05/25/2022     Influenza Vaccine 18-64 (Flublok) 11/20/2019     Influenza  Vaccine >6 months,quad, PF 12/13/2017, 03/08/2024     TDAP Vaccine (Adacel) 10/11/2014     Tdap (Adult) Unspecified Formulation 1965     No Known Allergies  OBJECTIVE:                                                                 /82   Pulse 83   Wt 107.9 kg (237 lb 14 oz)   SpO2 98%   BMI 32.26 kg/m          Physical Exam  Vitals and nursing note reviewed.   Constitutional:       General: He is not in acute distress.     Appearance: He is not diaphoretic.   HENT:      Head: Normocephalic and atraumatic.      Right Ear: Tympanic membrane, ear canal and external ear normal.      Left Ear: Tympanic membrane, ear canal and external ear normal.      Nose: Septal deviation (Septal spur bilaterally, leftward septal deviation), mucosal edema (Mild) and rhinorrhea present. Rhinorrhea is purulent.      Right Turbinates: Enlarged (Mildly).      Left Turbinates: Enlarged (Mildly).      Mouth/Throat:      Lips: Pink.      Mouth: Mucous membranes are moist.      Pharynx: Oropharynx is clear. No pharyngeal swelling, oropharyngeal exudate or posterior oropharyngeal erythema.   Eyes:      General:         Right eye: No discharge.         Left eye: No discharge.      Conjunctiva/sclera: Conjunctivae normal.   Cardiovascular:      Rate and Rhythm: Normal rate and regular rhythm.      Heart sounds: Normal heart sounds. No murmur heard.  Pulmonary:      Effort: Pulmonary effort is normal. No respiratory distress.      Breath sounds: Normal breath sounds and air entry. No stridor, decreased air movement or transmitted upper airway sounds. No decreased breath sounds, wheezing, rhonchi or rales.   Musculoskeletal:         General: Normal range of motion.   Skin:     General: Skin is warm.   Neurological:      Mental Status: He is alert and oriented to person, place, and time.   Psychiatric:         Mood and Affect: Mood normal.         Behavior: Behavior normal.           WORKUP:     SPIROMETRY       FVC 4.86L (95% of  predicted).     FEV1 3.78L (98% of predicted).     FEV1/FVC 78%      I have reviewed and interpreted these results. My interpretation:The office spirometry performed today doesn't suggest an obstruction.         ASSESSMENT/PLAN:    Allergic rhinoconjunctivitis  DNS (deviated nasal septum)  Acute sinusitis with symptoms > 10 days    Symptoms are not well-controlled.  Complicated by deviated nasal septum and recurrent acute sinus infection.    - Use intranasal fluticasone (Flonase) 1-2 sprays in each nostril once daily.  - Add azelastine nasal spray, 2 sprays in each nostril twice daily as needed.  - Use Optivar 1 drop in each eye twice daily as needed.  If symptoms persist despite medications and allergen avoidance, or if medications are not tolerated, allergen immunotherapy is recommended.   We briefly discussed allergen immunotherapy today.    - fluticasone (FLONASE) 50 MCG/ACT nasal spray  Dispense: 48 g; Refill: 2  - azelastine (ASTELIN) 0.1 % nasal spray  Dispense: 30 mL; Refill: 3  - Adult ENT  Referral  - azelastine (OPTIVAR) 0.05 % ophthalmic solution  Dispense: 6 mL; Refill: 3  - azithromycin (ZITHROMAX) 250 MG tablet  Dispense: 6 tablet; Refill: 0    Wheezing    Has symptoms about twice a week at night.  Does have a history of obstructive sleep apnea.  Has not tried albuterol inhaler.  -Suggest a trial over AirSupra.    - Albuterol-Budesonide (AIRSUPRA) 90-80 MCG/ACT AERO  Dispense: 10.7 g; Refill: 3  - BREATHING CAPACITY TEST [96186]      Follow-up in 2 to 3 months or sooner if needed.    Thank you for allowing us to participate in the care of this patient. Please feel free to contact us if there are any questions or concerns about the patient.    Disclaimer: This note consists of symbols derived from keyboarding, dictation and/or voice recognition software. As a result, there may be errors in the script that have gone undetected. Please consider this when interpreting information found in this  chart.    Troy Lockhart MD, FAAAAI, FACAAI  Allergy, Asthma and Immunology     [facility]        Again, thank you for allowing me to participate in the care of your patient.        Sincerely,        Troy Lockhart MD

## 2024-03-12 NOTE — PROGRESS NOTES
SUBJECTIVE:                                                                   Roni Martin presents today to our Allergy Clinic at [facility] for a follow up visit. He is a 58 year old male with a history of allergic rhinoconjunctivitis, deviated nasal septum, and possible asthma.    History of chronic rhinoconjunctivitis for decades.  Perennial pattern with Spring, Summer, and Fall exacerbations.   In March 2023, elevated total serum IgE, 1305 (0-114). Serum IgE for regional aeroallergen panel with sensitivity to cat,dust mites, grass pollen, weed pollen, and tree pollen. Mild sensitivity to dog and Alternaria mold noted.  History of chest pressure waking him up at night causing wheezing and coughing.  Would self resolve in 1 hour.  Previous cardiac workup was done and was normal, per patient.    Currently, he uses intranasal fluticasone 1 spray in each nostril once daily.  He doesn't recall using azelastine nasal spray.   He was recently prescribed ipratropium bromide. He hasn't picked it up. He takes loratadine 10 mg by mouth once daily.   He has yellow rhinorrhea, constant nasal congestion bilaterally, sinus pressure, postnasal drip, and itchy eyes.     He continue having wheezing at night about twice a night. Doesn't use albuterol inhaler. Denies symptoms during the day.   Denies smoking.       Patient Active Problem List   Diagnosis    Obesity    Benign essential hypertension    Elevated fasting blood sugar    Hyperlipidemia LDL goal <130       Past Medical History:   Diagnosis Date    Hypertension       Problem (# of Occurrences) Relation (Name,Age of Onset)    Parkinsonism (1) Mother    Diabetes (1) Father    Hypertension (1) Father           Negative family history of: Hyperlipidemia, Heart Disease          Past Surgical History:   Procedure Laterality Date    COLONOSCOPY N/A 07/18/2022    Procedure: COLONOSCOPY with polypectomy;  Surgeon: Darin Willoughby MD;  [facility]: [facility]  THORACENTESIS  11/25/2019    THORACENTESIS Left 11/25/2019    Procedure: THORACENTESIS;  Surgeon: Gal Will PA-C;  Location:  OR     Social History     Socioeconomic History    Marital status:      Spouse name: None    Number of children: None    Years of education: None    Highest education level: None   Occupational History    Occupation: [employer], [employer]   Tobacco Use    Smoking status: Never    Smokeless tobacco: Never   Vaping Use    Vaping Use: Never used   Substance and Sexual Activity    Alcohol use: Yes     Comment: 3 times per week    Drug use: No    Sexual activity: Yes     Partners: Female   Social History Narrative    March 12, 2024        ENVIRONMENTAL HISTORY: [place] The home is heated with a forced air. They do have central air conditioning. The patient's bedroom is furnished with carpeting in bedroom.  Pets inside the house include 1 cat(s). There is no history of cockroach or mice infestation. There is/are 0 smokers in the house.  The house does not have a damp basement.      Social Determinants of Health     Interpersonal Safety: Low Risk  (3/8/2024)    Interpersonal Safety     Do you feel physically and emotionally safe where you currently live?: Yes     Within the past 12 months, have you been hit, slapped, kicked or otherwise physically hurt by someone?: No     Within the past 12 months, have you been humiliated or emotionally abused in other ways by your partner or ex-partner?: No           Review of Systems        Current Outpatient Medications:     Albuterol-Budesonide (AIRSUPRA) 90-80 MCG/ACT AERO, Inhale 2 Inhalations into the lungs every 4 hours as needed (Wheezing, chest tightness, shortness of breath, or persistent cough), Disp: 10.7 g, Rfl: 3    azelastine (ASTELIN) 0.1 % nasal spray, Spray 2 sprays into both nostrils 2 times daily as needed for rhinitis, Disp: 30 mL, Rfl: 3    azelastine (OPTIVAR) 0.05 % ophthalmic  solution, Apply 1 drop to eye 2 times daily, Disp: 6 mL, Rfl: 3    azithromycin (ZITHROMAX) 250 MG tablet, Take 2 tablets (500 mg) by mouth daily for 1 day, THEN 1 tablet (250 mg) daily for 4 days., Disp: 6 tablet, Rfl: 0    fluticasone (FLONASE) 50 MCG/ACT nasal spray, Spray 1-2 sprays into both nostrils daily, Disp: 48 g, Rfl: 2    allopurinol (ZYLOPRIM) 300 MG tablet, Take 1 tablet (300 mg) by mouth daily, Disp: 90 tablet, Rfl: 3    atorvastatin (LIPITOR) 10 MG tablet, Take 1 tablet (10 mg) by mouth daily, Disp: 90 tablet, Rfl: 3    azelastine (OPTIVAR) 0.05 % ophthalmic solution, Apply 1 drop to eye 2 times daily as needed (itchy/water eyes) (Patient not taking: Reported on 3/8/2024), Disp: 6 mL, Rfl: 3    diltiazem ER (TIADYLT ER) 360 MG 24 hr ER beaded capsule, Take 1 capsule (360 mg) by mouth daily, Disp: 90 capsule, Rfl: 3    ipratropium (ATROVENT) 0.06 % nasal spray, Spray 2 sprays into both nostrils 3 times daily, Disp: 15 mL, Rfl: 3    lisinopril (ZESTRIL) 10 MG tablet, Take 1 tablet (10 mg) by mouth daily, Disp: 90 tablet, Rfl: 3    loratadine (CLARITIN REDITABS) 10 MG ODT, Take 1 tablet (10 mg) by mouth daily (Patient not taking: Reported on 3/12/2024), Disp: 90 tablet, Rfl: 3    loratadine-pseudoePHEDrine (ALLERGY RELIEF/NASAL DECONGEST)  MG 24 hr tablet, TAKE 1 TABLET BY MOUTH ONCE DAILY AS NEEDED FOR ALLERGIES, Disp: 30 tablet, Rfl: 0  Immunization History   Administered Date(s) Administered    COVID-19 12+ (2023-24) (Pfizer) 03/08/2024    COVID-19 Monovalent 18+ (Moderna) 04/23/2021, 05/21/2021    COVID-19 Monovalent Booster 18+ (Moderna) 05/25/2022    Influenza Vaccine 18-64 (Flublok) 11/20/2019    Influenza Vaccine >6 months,quad, PF 12/13/2017, 03/08/2024    TDAP Vaccine (Adacel) 10/11/2014    Tdap (Adult) Unspecified Formulation 1965     No Known Allergies  OBJECTIVE:                                                                 /82   Pulse 83   Wt 107.9 kg (237 lb 14 oz)    SpO2 98%   BMI 32.26 kg/m          Physical Exam  Vitals and nursing note reviewed.   Constitutional:       General: He is not in acute distress.     Appearance: He is not diaphoretic.   HENT:      Head: Normocephalic and atraumatic.      Right Ear: Tympanic membrane, ear canal and external ear normal.      Left Ear: Tympanic membrane, ear canal and external ear normal.      Nose: Septal deviation (Septal spur bilaterally, leftward septal deviation), mucosal edema (Mild) and rhinorrhea present. Rhinorrhea is purulent.      Right Turbinates: Enlarged (Mildly).      Left Turbinates: Enlarged (Mildly).      Mouth/Throat:      Lips: Pink.      Mouth: Mucous membranes are moist.      Pharynx: Oropharynx is clear. No pharyngeal swelling, oropharyngeal exudate or posterior oropharyngeal erythema.   Eyes:      General:         Right eye: No discharge.         Left eye: No discharge.      Conjunctiva/sclera: Conjunctivae normal.   Cardiovascular:      Rate and Rhythm: Normal rate and regular rhythm.      Heart sounds: Normal heart sounds. No murmur heard.  Pulmonary:      Effort: Pulmonary effort is normal. No respiratory distress.      Breath sounds: Normal breath sounds and air entry. No stridor, decreased air movement or transmitted upper airway sounds. No decreased breath sounds, wheezing, rhonchi or rales.   Musculoskeletal:         General: Normal range of motion.   Skin:     General: Skin is warm.   Neurological:      Mental Status: He is alert and oriented to person, place, and time.   Psychiatric:         Mood and Affect: Mood normal.         Behavior: Behavior normal.           WORKUP:     SPIROMETRY       FVC 4.86L (95% of predicted).     FEV1 3.78L (98% of predicted).     FEV1/FVC 78%      I have reviewed and interpreted these results. My interpretation:The office spirometry performed today doesn't suggest an obstruction.         ASSESSMENT/PLAN:    Allergic rhinoconjunctivitis  DNS (deviated nasal  septum)  Acute sinusitis with symptoms > 10 days    Symptoms are not well-controlled.  Complicated by deviated nasal septum and recurrent acute sinus infection.    - Use intranasal fluticasone (Flonase) 1-2 sprays in each nostril once daily.  - Add azelastine nasal spray, 2 sprays in each nostril twice daily as needed.  - Use Optivar 1 drop in each eye twice daily as needed.  If symptoms persist despite medications and allergen avoidance, or if medications are not tolerated, allergen immunotherapy is recommended.   We briefly discussed allergen immunotherapy today.    - fluticasone (FLONASE) 50 MCG/ACT nasal spray  Dispense: 48 g; Refill: 2  - azelastine (ASTELIN) 0.1 % nasal spray  Dispense: 30 mL; Refill: 3  - Adult ENT  Referral  - azelastine (OPTIVAR) 0.05 % ophthalmic solution  Dispense: 6 mL; Refill: 3  - azithromycin (ZITHROMAX) 250 MG tablet  Dispense: 6 tablet; Refill: 0    Wheezing    Has symptoms about twice a week at night.  Does have a history of obstructive sleep apnea.  Has not tried albuterol inhaler.  -Suggest a trial over AirSupra.    - Albuterol-Budesonide (AIRSUPRA) 90-80 MCG/ACT AERO  Dispense: 10.7 g; Refill: 3  - BREATHING CAPACITY TEST [16489]      Follow-up in 2 to 3 months or sooner if needed.    Thank you for allowing us to participate in the care of this patient. Please feel free to contact us if there are any questions or concerns about the patient.    Disclaimer: This note consists of symbols derived from keyboarding, dictation and/or voice recognition software. As a result, there may be errors in the script that have gone undetected. Please consider this when interpreting information found in this chart.    Troy Lockhart MD, FAAAAI, FACAAI  Allergy, Asthma and Immunology     [facility]

## 2024-03-12 NOTE — TELEPHONE ENCOUNTER
Albuterol-Budesonide (AIRSUPRA) 90-80 MCG/ACT AERO       This med is unavailable right now .  Could the DrJose Change to something else ? Thank you

## 2024-03-15 ENCOUNTER — TELEPHONE (OUTPATIENT)
Dept: ALLERGY | Facility: OTHER | Age: 59
End: 2024-03-15
Payer: COMMERCIAL

## 2024-03-15 DIAGNOSIS — R06.2 WHEEZING: Primary | ICD-10-CM

## 2024-03-15 RX ORDER — ALBUTEROL SULFATE 90 UG/1
2-4 AEROSOL, METERED RESPIRATORY (INHALATION) EVERY 4 HOURS PRN
Qty: 18 G | Refills: 3 | Status: SHIPPED | OUTPATIENT
Start: 2024-03-15

## 2024-03-15 NOTE — TELEPHONE ENCOUNTER
Coupon doesn't work unless covered by insurance first.   Does not want to wait for a prior auth.  Approximately $500 out of pocket.     Requesting alternative be sent in.    Merna PAREDES, Specialty RN 3/15/2024 1:40 PM

## 2024-03-15 NOTE — TELEPHONE ENCOUNTER
Health Call Center    Phone Message    May a detailed message be left on voicemail: yes     Reason for Call: Medication Question or concern regarding medication   Prescription Clarification  Name of Medication: Albuterol-Budesonide (AIRSUPRA) 90-80 MCG/ACT AERO   Prescribing Provider: Dr. Lockhart   Pharmacy: [facility] [id number] [place] [address]    What on the order needs clarification? Pt states this needs a prior authorization but that it will take over two weeks to get it. They would like to discuss alternatives.      Action Taken: Other: ER Allergy    Travel Screening: Not Applicable

## 2024-03-15 NOTE — TELEPHONE ENCOUNTER
left for patient's wife, Kate, letting her know that Albuterol was sent to [facility] in [place].    Left call back number for today until 4 pm with any questions and main line [phone] if calling back after that time.    Merna PAREDES, Specialty RN 3/15/2024 3:22 PM

## 2025-01-09 ENCOUNTER — TELEPHONE (OUTPATIENT)
Dept: WOUND CARE | Facility: CLINIC | Age: 60
End: 2025-01-09

## 2025-01-09 ENCOUNTER — OFFICE VISIT (OUTPATIENT)
Dept: FAMILY MEDICINE | Facility: CLINIC | Age: 60
End: 2025-01-09
Payer: COMMERCIAL

## 2025-01-09 VITALS
OXYGEN SATURATION: 97 % | RESPIRATION RATE: 16 BRPM | TEMPERATURE: 98.6 F | SYSTOLIC BLOOD PRESSURE: 118 MMHG | DIASTOLIC BLOOD PRESSURE: 84 MMHG | HEART RATE: 78 BPM | BODY MASS INDEX: 32.9 KG/M2 | WEIGHT: 235 LBS | HEIGHT: 71 IN

## 2025-01-09 DIAGNOSIS — L03.115 CELLULITIS OF RIGHT LOWER EXTREMITY: Primary | ICD-10-CM

## 2025-01-09 RX ORDER — CEPHALEXIN 500 MG/1
500 CAPSULE ORAL 3 TIMES DAILY
Qty: 30 CAPSULE | Refills: 0 | Status: SHIPPED | OUTPATIENT
Start: 2025-01-09

## 2025-01-09 RX ORDER — PREDNISONE 20 MG/1
20 TABLET ORAL DAILY
Qty: 10 TABLET | Refills: 0 | Status: SHIPPED | OUTPATIENT
Start: 2025-01-09

## 2025-01-09 ASSESSMENT — PATIENT HEALTH QUESTIONNAIRE - PHQ9
10. IF YOU CHECKED OFF ANY PROBLEMS, HOW DIFFICULT HAVE THESE PROBLEMS MADE IT FOR YOU TO DO YOUR WORK, TAKE CARE OF THINGS AT HOME, OR GET ALONG WITH OTHER PEOPLE: NOT DIFFICULT AT ALL
SUM OF ALL RESPONSES TO PHQ QUESTIONS 1-9: 3
SUM OF ALL RESPONSES TO PHQ QUESTIONS 1-9: 3

## 2025-01-09 ASSESSMENT — PAIN SCALES - GENERAL: PAINLEVEL_OUTOF10: MODERATE PAIN (4)

## 2025-01-09 NOTE — PROGRESS NOTES
"S: Roni Martin is a 59 year old male with R foot infection.  Red, weeping, bottom of foot has lost skin.  Walking on it, not especially painful.  Has been working with boots.     A few weeks, worsening.  \"I think it's something from my boot\"    No fever, not ill    Doesn't have to work next few days    O:/84   Pulse 78   Temp 98.6  F (37  C)   Resp 16   Ht 1.791 m (5' 10.5\")   Wt 106.6 kg (235 lb)   SpO2 97%   BMI 33.24 kg/m    GEN: Alert and oriented, in no acute distress  Entire R foot is red, weeping, bottom of foot has sloughed off much of the skin.      A: cellulitis, foot    P: keflex, prednisone.  Wound care.  Told him this is serious, infection could take off, if not improving over next few days needs follow up.      I also told him he possibly may need IV abx, hospital care if this worsens.  To ED if not improving.  He understands.  Told   "

## 2025-01-09 NOTE — TELEPHONE ENCOUNTER
Vascular Referral Intake    Appointment note (to be pasted into appt note. Also add where additional info is located ie: outside images pushed to PACS, in Epic, sent to HIM, etc):   Referred by Orlando Jones MD  for Right plantar foot wound with  Cellulitis of right lower extremity     Specialty: Wound Clinic    Specific Provider if Necessary:  DILIA Carrasco or DILIA Valverde, or Dr. Fajardo ([place] requested)    Visit Type: New    Time Frame: Next Available    Testing/Imaging Needed Before Consult: none    *Schedulers: Please send welcome letter to patient after appointment(s) have been scheduled*

## 2025-01-16 DIAGNOSIS — L03.115 CELLULITIS OF RIGHT LOWER EXTREMITY: ICD-10-CM

## 2025-01-16 RX ORDER — PREDNISONE 20 MG/1
TABLET ORAL
Qty: 10 TABLET | Refills: 0 | OUTPATIENT
Start: 2025-01-16

## 2025-03-27 DIAGNOSIS — E78.5 HYPERLIPIDEMIA LDL GOAL <130: ICD-10-CM

## 2025-03-27 DIAGNOSIS — I10 BENIGN ESSENTIAL HYPERTENSION: ICD-10-CM

## 2025-03-27 DIAGNOSIS — M10.9 GOUT, UNSPECIFIED CAUSE, UNSPECIFIED CHRONICITY, UNSPECIFIED SITE: ICD-10-CM

## 2025-03-27 RX ORDER — ALLOPURINOL 300 MG/1
1 TABLET ORAL
Qty: 90 TABLET | Refills: 3 | OUTPATIENT
Start: 2025-03-27

## 2025-03-27 RX ORDER — LISINOPRIL 10 MG/1
10 TABLET ORAL
Qty: 90 TABLET | Refills: 3 | OUTPATIENT
Start: 2025-03-27

## 2025-03-27 RX ORDER — ATORVASTATIN CALCIUM 10 MG/1
10 TABLET, FILM COATED ORAL
Qty: 90 TABLET | Refills: 3 | OUTPATIENT
Start: 2025-03-27

## 2025-03-27 RX ORDER — DILTIAZEM HYDROCHLORIDE 360 MG/1
CAPSULE, EXTENDED RELEASE ORAL
Qty: 90 CAPSULE | Refills: 3 | OUTPATIENT
Start: 2025-03-27

## 2025-03-31 RX ORDER — LISINOPRIL 10 MG/1
10 TABLET ORAL DAILY
Qty: 90 TABLET | Refills: 0 | Status: SHIPPED | OUTPATIENT
Start: 2025-03-31

## 2025-03-31 RX ORDER — ATORVASTATIN CALCIUM 10 MG/1
10 TABLET, FILM COATED ORAL DAILY
Qty: 90 TABLET | Refills: 0 | Status: SHIPPED | OUTPATIENT
Start: 2025-03-31

## 2025-03-31 RX ORDER — DILTIAZEM HYDROCHLORIDE 360 MG/1
360 CAPSULE, EXTENDED RELEASE ORAL DAILY
Qty: 90 CAPSULE | Refills: 0 | Status: SHIPPED | OUTPATIENT
Start: 2025-03-31

## 2025-03-31 RX ORDER — ALLOPURINOL 300 MG/1
1 TABLET ORAL DAILY
Qty: 90 TABLET | Refills: 0 | Status: SHIPPED | OUTPATIENT
Start: 2025-03-31

## 2025-03-31 NOTE — TELEPHONE ENCOUNTER
Patient scheduled appt for 4/7/25. Pt would like a bridge refill  On lisinopril (ZESTRIL) 10 MG tablet     Darlene Cronin/ Patient

## 2025-04-07 ENCOUNTER — OFFICE VISIT (OUTPATIENT)
Dept: FAMILY MEDICINE | Facility: CLINIC | Age: 60
End: 2025-04-07
Payer: COMMERCIAL

## 2025-04-07 VITALS
DIASTOLIC BLOOD PRESSURE: 70 MMHG | HEART RATE: 72 BPM | BODY MASS INDEX: 33.6 KG/M2 | OXYGEN SATURATION: 97 % | RESPIRATION RATE: 18 BRPM | WEIGHT: 240 LBS | TEMPERATURE: 98.3 F | HEIGHT: 71 IN | SYSTOLIC BLOOD PRESSURE: 120 MMHG

## 2025-04-07 DIAGNOSIS — I10 BENIGN ESSENTIAL HYPERTENSION: ICD-10-CM

## 2025-04-07 DIAGNOSIS — M10.9 GOUT, UNSPECIFIED CAUSE, UNSPECIFIED CHRONICITY, UNSPECIFIED SITE: ICD-10-CM

## 2025-04-07 DIAGNOSIS — J30.2 SEASONAL ALLERGIC RHINITIS, UNSPECIFIED TRIGGER: ICD-10-CM

## 2025-04-07 DIAGNOSIS — E78.5 HYPERLIPIDEMIA LDL GOAL <130: ICD-10-CM

## 2025-04-07 LAB
ANION GAP SERPL CALCULATED.3IONS-SCNC: 14 MMOL/L (ref 7–15)
BUN SERPL-MCNC: 10.7 MG/DL (ref 8–23)
CALCIUM SERPL-MCNC: 9.9 MG/DL (ref 8.8–10.4)
CHLORIDE SERPL-SCNC: 103 MMOL/L (ref 98–107)
CHOLEST SERPL-MCNC: 203 MG/DL
CREAT SERPL-MCNC: 0.86 MG/DL (ref 0.67–1.17)
EGFRCR SERPLBLD CKD-EPI 2021: >90 ML/MIN/1.73M2
FASTING STATUS PATIENT QL REPORTED: NO
FASTING STATUS PATIENT QL REPORTED: NO
GLUCOSE SERPL-MCNC: 112 MG/DL (ref 70–99)
HCO3 SERPL-SCNC: 24 MMOL/L (ref 22–29)
HDLC SERPL-MCNC: 106 MG/DL
LDLC SERPL CALC-MCNC: 88 MG/DL
NONHDLC SERPL-MCNC: 97 MG/DL
POTASSIUM SERPL-SCNC: 4.2 MMOL/L (ref 3.4–5.3)
SODIUM SERPL-SCNC: 141 MMOL/L (ref 135–145)
TRIGL SERPL-MCNC: 46 MG/DL
URATE SERPL-MCNC: 3.5 MG/DL (ref 3.4–7)

## 2025-04-07 PROCEDURE — 90715 TDAP VACCINE 7 YRS/> IM: CPT | Performed by: FAMILY MEDICINE

## 2025-04-07 PROCEDURE — 3074F SYST BP LT 130 MM HG: CPT | Performed by: FAMILY MEDICINE

## 2025-04-07 PROCEDURE — 80048 BASIC METABOLIC PNL TOTAL CA: CPT | Performed by: FAMILY MEDICINE

## 2025-04-07 PROCEDURE — 90471 IMMUNIZATION ADMIN: CPT | Performed by: FAMILY MEDICINE

## 2025-04-07 PROCEDURE — 36415 COLL VENOUS BLD VENIPUNCTURE: CPT | Performed by: FAMILY MEDICINE

## 2025-04-07 PROCEDURE — 80061 LIPID PANEL: CPT | Performed by: FAMILY MEDICINE

## 2025-04-07 PROCEDURE — 3078F DIAST BP <80 MM HG: CPT | Performed by: FAMILY MEDICINE

## 2025-04-07 PROCEDURE — 99214 OFFICE O/P EST MOD 30 MIN: CPT | Mod: 25 | Performed by: FAMILY MEDICINE

## 2025-04-07 PROCEDURE — 84550 ASSAY OF BLOOD/URIC ACID: CPT | Performed by: FAMILY MEDICINE

## 2025-04-07 RX ORDER — LORATADINE AND PSEUDOEPHEDRINE SULFATE 10; 240 MG/1; MG/1
TABLET, FILM COATED, EXTENDED RELEASE ORAL
Qty: 30 TABLET | Refills: 0 | Status: SHIPPED | OUTPATIENT
Start: 2025-04-07

## 2025-04-07 RX ORDER — LISINOPRIL 10 MG/1
10 TABLET ORAL DAILY
Qty: 90 TABLET | Refills: 3 | Status: SHIPPED | OUTPATIENT
Start: 2025-04-07

## 2025-04-07 RX ORDER — DILTIAZEM HYDROCHLORIDE 360 MG/1
360 CAPSULE, EXTENDED RELEASE ORAL DAILY
Qty: 90 CAPSULE | Refills: 3 | Status: SHIPPED | OUTPATIENT
Start: 2025-04-07

## 2025-04-07 RX ORDER — ATORVASTATIN CALCIUM 10 MG/1
10 TABLET, FILM COATED ORAL DAILY
Qty: 90 TABLET | Refills: 3 | Status: SHIPPED | OUTPATIENT
Start: 2025-04-07

## 2025-04-07 RX ORDER — ALLOPURINOL 300 MG/1
1 TABLET ORAL DAILY
Qty: 90 TABLET | Refills: 3 | Status: SHIPPED | OUTPATIENT
Start: 2025-04-07

## 2025-04-07 NOTE — NURSING NOTE
"Chief Complaint   Patient presents with    Hypertension    Hyperlipidemia    Arthritis     Refill Alluporinal        Initial /70   Pulse 72   Temp 98.3  F (36.8  C) (Tympanic)   Resp 18   Ht 1.791 m (5' 10.5\")   Wt 108.9 kg (240 lb)   SpO2 97%   BMI 33.95 kg/m   Estimated body mass index is 33.95 kg/m  as calculated from the following:    Height as of this encounter: 1.791 m (5' 10.5\").    Weight as of this encounter: 108.9 kg (240 lb).    Patient presents to the clinic using No DME    Is there anyone who you would like to be able to receive your results? No  If yes have patient fill out MARSHA      "

## 2025-04-07 NOTE — LETTER
April 10, 2025      Roni Martin  [address]  [place] [address]        Dear ,    We are writing to inform you of your test results.    Cholesterol, uric acid level and metabolic panel are all within acceptable limits. Your glucose is mildly elevated, this is likely due to not fasting. Consider obtaining hgba1c at next annual visit.    Resulted Orders   Lipid panel reflex to direct LDL Non-fasting   Result Value Ref Range    Cholesterol 203 (H) <200 mg/dL    Triglycerides 46 <150 mg/dL    Direct Measure  >=40 mg/dL    LDL Cholesterol Calculated 88 <100 mg/dL    Non HDL Cholesterol 97 <130 mg/dL    Patient Fasting > 8hrs? No     Narrative    Cholesterol  Desirable: < 200 mg/dL  Borderline High: 200 - 239 mg/dL  High: >= 240 mg/dL    Triglycerides  Normal: < 150 mg/dL  Borderline High: 150 - 199 mg/dL  High: 200-499 mg/dL  Very High: >= 500 mg/dL    Direct Measure HDL  Female: >= 50 mg/dL   Male: >= 40 mg/dL    LDL Cholesterol  Desirable: < 100 mg/dL  Above Desirable: 100 - 129 mg/dL   Borderline High: 130 - 159 mg/dL   High:  160 - 189 mg/dL   Very High: >= 190 mg/dL    Non HDL Cholesterol  Desirable: < 130 mg/dL  Above Desirable: 130 - 159 mg/dL  Borderline High: 160 - 189 mg/dL  High: 190 - 219 mg/dL  Very High: >= 220 mg/dL   BASIC METABOLIC PANEL   Result Value Ref Range    Sodium 141 135 - 145 mmol/L    Potassium 4.2 3.4 - 5.3 mmol/L    Chloride 103 98 - 107 mmol/L    Carbon Dioxide (CO2) 24 22 - 29 mmol/L    Anion Gap 14 7 - 15 mmol/L    Urea Nitrogen 10.7 8.0 - 23.0 mg/dL    Creatinine 0.86 0.67 - 1.17 mg/dL    GFR Estimate >90 >60 mL/min/1.73m2      Comment:      eGFR calculated using 2021 CKD-EPI equation.    Calcium 9.9 8.8 - 10.4 mg/dL    Glucose 112 (H) 70 - 99 mg/dL    Patient Fasting > 8hrs? No    Uric acid   Result Value Ref Range    Uric Acid 3.5 3.4 - 7.0 mg/dL       If you have any questions or concerns, please call the clinic at the number listed above.        Sincerely,      Pilar Lynn MD    Electronically signed

## 2025-04-07 NOTE — PROGRESS NOTES
"  Assessment & Plan     Gout, unspecified cause, unspecified chronicity, unspecified site  stable  - Uric acid; Future  - allopurinol (ZYLOPRIM) 300 MG tablet; Take 1 tablet (300 mg) by mouth daily.    Hyperlipidemia LDL goal <130  stable  - Lipid panel reflex to direct LDL Non-fasting; Future  - atorvastatin (LIPITOR) 10 MG tablet; Take 1 tablet (10 mg) by mouth daily.    Benign essential hypertension  stable  - BASIC METABOLIC PANEL; Future  - diltiazem ER (TIADYLT ER) 360 MG 24 hr ER beaded capsule; Take 1 capsule (360 mg) by mouth daily.  - lisinopril (ZESTRIL) 10 MG tablet; Take 1 tablet (10 mg) by mouth daily.    Seasonal allergic rhinitis, unspecified trigger  Stable  Use sparingly - loratadine-pseudoePHEDrine (ALLERGY RELIEF/NASAL DECONGEST)  MG 24 hr tablet; TAKE 1 TABLET BY MOUTH ONCE DAILY AS NEEDED FOR ALLERGIES          BMI  Estimated body mass index is 33.95 kg/m  as calculated from the following:    Height as of this encounter: 1.791 m (5' 10.5\").    Weight as of this encounter: 108.9 kg (240 lb).             Michael Tamayo is a 59 year old, presenting for the following health issues:  Hypertension, Hyperlipidemia, and Arthritis (Refill Alluporinal )        4/7/2025    10:23 AM   Additional Questions   Roomed by Mariann SINGLETARY   Accompanied by self     History of Present Illness       Reason for visit:  Check up   He is taking medications regularly.          Hyperlipidemia Follow-Up    Are you regularly taking any medication or supplement to lower your cholesterol?   Yes-    Are you having muscle aches or other side effects that you think could be caused by your cholesterol lowering medication?  no    Hypertension Follow-up    Do you check your blood pressure regularly outside of the clinic? No   Are you following a low salt diet? No  Are your blood pressures ever more than 140 on the top number (systolic) OR more   than 90 on the bottom number (diastolic), for example 140/90? No    BP Readings from " "Last 2 Encounters:   04/07/25 120/70   01/09/25 118/84       How many days per week do you miss taking your medication? 0    Medication Followup of Allopurinol   Taking Medication as prescribed: yes  Side Effects:  None  Medication Helping Symptoms:  yes            Objective    /70   Pulse 72   Temp 98.3  F (36.8  C) (Tympanic)   Resp 18   Ht 1.791 m (5' 10.5\")   Wt 108.9 kg (240 lb)   SpO2 97%   BMI 33.95 kg/m    Body mass index is 33.95 kg/m .  Physical Exam   GENERAL: alert and no distress  NECK: no adenopathy, no asymmetry, masses, or scars  RESP: lungs clear to auscultation - no rales, rhonchi or wheezes  CV: regular rate and rhythm, normal S1 S2, no S3 or S4, no murmur, click or rub, no peripheral edema  MS: no gross musculoskeletal defects noted, no edema            Signed Electronically by: Pilar Lynn MD    "

## 2025-05-19 DIAGNOSIS — J30.81 ALLERGIC RHINITIS DUE TO ANIMALS: ICD-10-CM

## 2025-05-20 RX ORDER — FLUTICASONE PROPIONATE 50 MCG
SPRAY, SUSPENSION (ML) NASAL
Qty: 48 G | Refills: 2 | OUTPATIENT
Start: 2025-05-20

## 2025-05-20 NOTE — TELEPHONE ENCOUNTER
Refused Prescriptions:                       Disp   Refills    fluticasone (FLONASE) 50 MCG/ACT nasal spr*48 g   2        Sig: SPRAY ONE (1) TO TWO (2) SPRAYS INTO BOTH NOSTRILS           DAILY  Refused By: INNA CAMARILLO  Reason for Refusal: Patient needs appointment    Inna Camarillo MSN, RN   Specialty Clinic, 5/20/2025 1:22 PM

## 2025-05-20 NOTE — TELEPHONE ENCOUNTER
Refill request declined. I have not seen the patient in over a year. He will need to schedule a follow-up appointment with us, or if the patient is hesitant, it may be advisable for them to reach out to their PCP for any necessary medication refills.    Troy Lockhart MD

## 2025-05-20 NOTE — TELEPHONE ENCOUNTER
Pending Prescriptions:                       Disp   Refills    fluticasone (FLONASE) 50 MCG/ACT nasal sp*48 g   2            Sig: SPRAY ONE (1) TO TWO (2) SPRAYS INTO BOTH           NOSTRILS DAILY    Routing refill request to provider for review/approval because:  Patient needs to be seen because it has been more than 1 year since last office visit.    LOV 3/12/24, no upcoming appt.    Viv Camarillo MSN, RN   Specialty Clinic, 5/20/2025 9:47 AM

## 2025-05-22 DIAGNOSIS — J30.81 ALLERGIC RHINITIS DUE TO ANIMALS: ICD-10-CM

## 2025-05-28 NOTE — TELEPHONE ENCOUNTER
Pending Prescriptions:                       Disp   Refills    fluticasone (FLONASE) 50 MCG/ACT nasal sp*48 g   2            Sig: SPRAY ONE (1) TO TWO (2) SPRAYS INTO BOTH           NOSTRILS DAILY    Routing refill request to provider for review/approval because:  Patient needs to be seen because it has been more than 1 year since last office visit.    LOV 3/12/24, recommended a 2-3 month follow up.  No upcoming appt.    Viv Camarillo MSN, RN   Specialty Clinic, 5/28/2025 1:58 PM

## 2025-05-29 RX ORDER — FLUTICASONE PROPIONATE 50 MCG
SPRAY, SUSPENSION (ML) NASAL
Qty: 16 G | Refills: 0 | OUTPATIENT
Start: 2025-05-29

## 2025-05-29 NOTE — TELEPHONE ENCOUNTER
Refused Prescriptions:                       Disp   Refills    fluticasone (FLONASE) 50 MCG/ACT nasal spr*16 g   0        Sig: SPRAY ONE (1) TO TWO (2) SPRAYS INTO BOTH NOSTRILS           DAILY  Refused By: MERNA OTT  Reason for Refusal: Patient needs appointment    Merna PAREDES BSN, RN, PHN 5/29/2025 10:43 AM

## 2025-06-03 ENCOUNTER — OFFICE VISIT (OUTPATIENT)
Dept: URGENT CARE | Facility: URGENT CARE | Age: 60
End: 2025-06-03
Payer: COMMERCIAL

## 2025-06-03 VITALS
BODY MASS INDEX: 33.24 KG/M2 | WEIGHT: 235 LBS | HEART RATE: 80 BPM | DIASTOLIC BLOOD PRESSURE: 95 MMHG | SYSTOLIC BLOOD PRESSURE: 141 MMHG | TEMPERATURE: 97.7 F | RESPIRATION RATE: 16 BRPM | OXYGEN SATURATION: 98 %

## 2025-06-03 DIAGNOSIS — R42 DIZZINESS: Primary | ICD-10-CM

## 2025-06-03 DIAGNOSIS — J30.1 SEASONAL ALLERGIC RHINITIS DUE TO POLLEN: ICD-10-CM

## 2025-06-03 LAB
ALBUMIN SERPL BCG-MCNC: 4.6 G/DL (ref 3.5–5.2)
ALP SERPL-CCNC: 87 U/L (ref 40–150)
ALT SERPL W P-5'-P-CCNC: 30 U/L (ref 0–70)
ANION GAP SERPL CALCULATED.3IONS-SCNC: 14 MMOL/L (ref 7–15)
AST SERPL W P-5'-P-CCNC: 40 U/L (ref 0–45)
BILIRUB SERPL-MCNC: 0.6 MG/DL
BUN SERPL-MCNC: 9 MG/DL (ref 8–23)
CALCIUM SERPL-MCNC: 10.3 MG/DL (ref 8.8–10.4)
CHLORIDE SERPL-SCNC: 100 MMOL/L (ref 98–107)
CREAT SERPL-MCNC: 0.86 MG/DL (ref 0.67–1.17)
EGFRCR SERPLBLD CKD-EPI 2021: >90 ML/MIN/1.73M2
ERYTHROCYTE [DISTWIDTH] IN BLOOD BY AUTOMATED COUNT: 12.9 % (ref 10–15)
GLUCOSE SERPL-MCNC: 104 MG/DL (ref 70–99)
HCO3 SERPL-SCNC: 26 MMOL/L (ref 22–29)
HCT VFR BLD AUTO: 43.2 % (ref 40–53)
HGB BLD-MCNC: 14.4 G/DL (ref 13.3–17.7)
MCH RBC QN AUTO: 32.8 PG (ref 26.5–33)
MCHC RBC AUTO-ENTMCNC: 33.3 G/DL (ref 31.5–36.5)
MCV RBC AUTO: 98 FL (ref 78–100)
PLATELET # BLD AUTO: 241 10E3/UL (ref 150–450)
POTASSIUM SERPL-SCNC: 4.1 MMOL/L (ref 3.4–5.3)
PROT SERPL-MCNC: 7.6 G/DL (ref 6.4–8.3)
RBC # BLD AUTO: 4.39 10E6/UL (ref 4.4–5.9)
SODIUM SERPL-SCNC: 140 MMOL/L (ref 135–145)
TSH SERPL DL<=0.005 MIU/L-ACNC: 1.63 UIU/ML (ref 0.3–4.2)
WBC # BLD AUTO: 5.7 10E3/UL (ref 4–11)

## 2025-06-03 PROCEDURE — 3079F DIAST BP 80-89 MM HG: CPT | Performed by: PHYSICIAN ASSISTANT

## 2025-06-03 PROCEDURE — 3075F SYST BP GE 130 - 139MM HG: CPT | Performed by: PHYSICIAN ASSISTANT

## 2025-06-03 PROCEDURE — 93000 ELECTROCARDIOGRAM COMPLETE: CPT | Performed by: PHYSICIAN ASSISTANT

## 2025-06-03 PROCEDURE — 80053 COMPREHEN METABOLIC PANEL: CPT | Performed by: PHYSICIAN ASSISTANT

## 2025-06-03 PROCEDURE — 85027 COMPLETE CBC AUTOMATED: CPT | Performed by: PHYSICIAN ASSISTANT

## 2025-06-03 PROCEDURE — 99214 OFFICE O/P EST MOD 30 MIN: CPT | Performed by: PHYSICIAN ASSISTANT

## 2025-06-03 PROCEDURE — 84443 ASSAY THYROID STIM HORMONE: CPT | Performed by: PHYSICIAN ASSISTANT

## 2025-06-03 PROCEDURE — 36415 COLL VENOUS BLD VENIPUNCTURE: CPT | Performed by: PHYSICIAN ASSISTANT

## 2025-06-03 RX ORDER — FLUTICASONE PROPIONATE 50 MCG
1 SPRAY, SUSPENSION (ML) NASAL 2 TIMES DAILY
Qty: 16 G | Refills: 0 | Status: SHIPPED | OUTPATIENT
Start: 2025-06-03

## 2025-06-03 NOTE — PROGRESS NOTES
{PROVIDER CHARTING PREFERENCE:283149}          Subjective   Chief Complaint   Patient presents with    Dizziness     Gets lightheaded on/off for last week when he bends over, has not happen for last couple of days. Says fingers and toes tingling. Notices it mostly in the morning.            6/3/2025     1:10 PM   Additional Questions   Roomed by Britta Arceo    Onset of symptoms was 1 week(s) ago.  Course of illness is waxing and waning.    Severity moderate  Current and Associated symptoms: dizziness, mostly when bending over, shortness of breath with activity, no chest pain  Treatment measures tried include None tried.  Predisposing factors include None.      {MA/LPN/RN Pre-Provider Visit Orders- hCG/UA/Strep (Optional):107880}  {SUPERLIST (Optional):484383}  {additonal problems for provider to add (Optional):263914}    {ROS Picklists (Optional):076055}      Objective    /86   Pulse 80   Temp 97.7  F (36.5  C) (Tympanic)   Resp 16   Wt 106.6 kg (235 lb)   SpO2 98%   BMI 33.24 kg/m    Body mass index is 33.24 kg/m .            Physical Exam   {Exam List (Optional):043372}    {Diagnostic Test Results (Optional):744703}        Signed Electronically by: Marita Askew PA-C  {Email feedback regarding this note to [email or web address]   :876991}   General: He is not in acute distress.     Appearance: He is well-developed.   HENT:      Head: Normocephalic and atraumatic.      Right Ear: Tympanic membrane and ear canal normal.      Left Ear: Tympanic membrane and ear canal normal.   Eyes:      Conjunctiva/sclera: Conjunctivae normal.   Cardiovascular:      Rate and Rhythm: Normal rate and regular rhythm.   Pulmonary:      Effort: Pulmonary effort is normal.      Breath sounds: Normal breath sounds.   Skin:     General: Skin is warm and dry.      Findings: No rash.   Psychiatric:         Behavior: Behavior normal.            EKG - Reviewed and interpreted by jose elias SAENZ  CBC- within normal limits         Signed Electronically by: Marita Askew PA-C

## 2025-06-03 NOTE — PROGRESS NOTES
Urgent Care Clinic Visit    Chief Complaint   Patient presents with    Dizziness     Gets lightheaded on/off for last week when he bends over, has not happen for last couple of days. Says fingers and toes tingling. Notices it mostly in the morning.                6/3/2025     1:10 PM   Additional Questions   Roomed by Britta           Urgent Care Clinic Visit    Chief Complaint   Patient presents with    Dizziness     Gets lightheaded on/off for last week when he bends over, has not happen for last couple of days. Says fingers and toes tingling. Notices it mostly in the morning.                6/3/2025     1:10 PM   Additional Questions   Roomed by Britta

## 2025-06-04 ENCOUNTER — RESULTS FOLLOW-UP (OUTPATIENT)
Dept: URGENT CARE | Facility: URGENT CARE | Age: 60
End: 2025-06-04

## 2025-07-14 ENCOUNTER — OFFICE VISIT (OUTPATIENT)
Dept: FAMILY MEDICINE | Facility: CLINIC | Age: 60
End: 2025-07-14
Payer: COMMERCIAL

## 2025-07-14 VITALS
SYSTOLIC BLOOD PRESSURE: 124 MMHG | BODY MASS INDEX: 32.9 KG/M2 | HEART RATE: 63 BPM | DIASTOLIC BLOOD PRESSURE: 80 MMHG | WEIGHT: 235 LBS | RESPIRATION RATE: 16 BRPM | HEIGHT: 71 IN | TEMPERATURE: 97.5 F | OXYGEN SATURATION: 97 %

## 2025-07-14 DIAGNOSIS — I10 BENIGN ESSENTIAL HYPERTENSION: ICD-10-CM

## 2025-07-14 DIAGNOSIS — R42 POSITIONAL LIGHTHEADEDNESS: Primary | ICD-10-CM

## 2025-07-14 DIAGNOSIS — L91.8 SKIN TAG: ICD-10-CM

## 2025-07-14 DIAGNOSIS — J30.1 SEASONAL ALLERGIC RHINITIS DUE TO POLLEN: ICD-10-CM

## 2025-07-14 PROCEDURE — 1126F AMNT PAIN NOTED NONE PRSNT: CPT | Performed by: PHYSICIAN ASSISTANT

## 2025-07-14 PROCEDURE — 3074F SYST BP LT 130 MM HG: CPT | Performed by: PHYSICIAN ASSISTANT

## 2025-07-14 PROCEDURE — 99213 OFFICE O/P EST LOW 20 MIN: CPT | Mod: 25 | Performed by: PHYSICIAN ASSISTANT

## 2025-07-14 PROCEDURE — 11200 RMVL SKIN TAGS UP TO&INC 15: CPT | Performed by: PHYSICIAN ASSISTANT

## 2025-07-14 PROCEDURE — 3079F DIAST BP 80-89 MM HG: CPT | Performed by: PHYSICIAN ASSISTANT

## 2025-07-14 RX ORDER — FLUTICASONE PROPIONATE 50 MCG
1-2 SPRAY, SUSPENSION (ML) NASAL 2 TIMES DAILY
Qty: 48 G | Refills: 3 | Status: SHIPPED | OUTPATIENT
Start: 2025-07-14

## 2025-07-14 ASSESSMENT — PAIN SCALES - GENERAL: PAINLEVEL_OUTOF10: NO PAIN (0)

## 2025-07-14 NOTE — PATIENT INSTRUCTIONS
Try drinking more water, goal to get urine clearish early in morning   If still getting lightheadedness, call me to decrease dilitiazem    Refilled flonase     Recommend another appt for finger/toe symptoms and pain in right leg

## 2025-07-14 NOTE — PROGRESS NOTES
Assessment & Plan     (R42) Positional lightheadedness  (primary encounter diagnosis)  Comment: suspect this is still likely more BP related although his recall is more that this occurs on bending down (not standing back up).  Will try treating as such but if does not improve, then may need to try treating for BPPV  Plan: drink more water    (I10) Benign essential hypertension  Comment: seems controlled though limited data  Plan: patient does not like to monitor, so plan as above    (J30.1) Seasonal allergic rhinitis due to pollen  Comment: refill  Plan: fluticasone (FLONASE) 50 MCG/ACT nasal spray    (L91.8) Skin tag  Comment: bothersome skin tag on right lateral neck.  PROCEDURE NOTE: Risks and benefits of procedure were explained. Verbal consent was obtained. The area was cleansed with alcohol.  Topical anesthesia via ethyl chloride spray. Skin tag was removed via shave technique.  EBL 0.  Patient tolerated procedure well.   Plan: REMOVAL OF SKIN TAGS, FIRST 15      MED REC REQUIRED  Post Medication Reconciliation Status: discharge medications reconciled, continue medications without change    Patient Instructions   Try drinking more water, goal to get urine clearish early in morning   If still getting lightheadedness, call me to decrease dilitiazem    Refilled flonase     Recommend another appt for finger/toe symptoms and pain in right leg      Michael Tamayo is a 59 year old, presenting for the following health issues:  ER F/U        7/14/2025     7:38 AM   Additional Questions   Roomed by Veronica STONER MA     History of Present Illness       Reason for visit:  Fallow up ergent care visit    He eats 0-1 servings of fruits and vegetables daily.He consumes 1 sweetened beverage(s) daily.He exercises with enough effort to increase his heart rate 30 to 60 minutes per day.  He exercises with enough effort to increase his heart rate 5 days per week.   He is taking medications regularly.        ED/UC Followup:  Facility:  " [facility]  Date of visit: 06/03/2025  Reason for visit: Dizziness  Current Status: Improvement in the dizziness, but still having some mild symptoms. Not as often. Would like refills of flonase     Symptoms infrequent now but seem to only occur when bends over.  Is not a room spinning dizziness but a lightheadedness.  It only happens when he is bent down, does not happen when it he stands back up.  Is generally always in the mornings.  Takes his BP med in the morning.  Can happen when he is working either inside or outside.  Is more when working, not on causal mornings.  No associated headaches, sweatiness, nausea.  No prodrome or postdrome.  Does not check BPs outside of here.      Allergy meds.  Wants refill of flonase - seems generally very helpful.  He doesn't remember if is better than the azelastine that was previously prescribed by the allergist.      He also notes  - Some pain in right leg. Remote history right knee/leg injury.   Numbness in fingers and toes.        Objective    /80   Pulse 63   Temp 97.5  F (36.4  C) (Tympanic)   Resp 16   Ht 1.791 m (5' 10.5\")   Wt 106.6 kg (235 lb)   SpO2 97%   BMI 33.24 kg/m    Body mass index is 33.24 kg/m .          Signed Electronically by: Ana Gonzales PA-C  /  "

## (undated) DEVICE — DRSG TEGADERM 2 3/8X2 3/4" 1624W

## (undated) DEVICE — LINEN GOWN XLG 5407

## (undated) DEVICE — NDL 15GA 1.5" 8881200029

## (undated) DEVICE — CONNECTOR STOPCOCK 3 WAY MALE LL MX4311L

## (undated) DEVICE — STOPCOCK ANGIO 1-WAY MARQUIS MLL  H1RC

## (undated) DEVICE — GOWN XLG DISP 9545

## (undated) DEVICE — TUBING MEDRAD 48" HIGH PRESSURE MX694

## (undated) DEVICE — COVER ULTRASOUND PROBE W/GEL FLEXI-FEEL 6"X58" LF  25-FF658

## (undated) DEVICE — NDL YUEH CENTESIS 5FRX10CM G09490 DTVN-5.0-19-10.0-YUEH

## (undated) DEVICE — CONNECTOR MALE TO MALE LL

## (undated) DEVICE — GLOVE PROTEXIS POWDER FREE SMT 7.5  2D72PT75X

## (undated) DEVICE — LINEN TOWEL PACK X5 5464

## (undated) DEVICE — GLOVE PROTEXIS W/NEU-THERA 8.0  2D73TE80

## (undated) DEVICE — Device

## (undated) DEVICE — CONTAINER EVACUATOR GLASS VACUTAINER 1000ML 1A8504